# Patient Record
Sex: FEMALE | Race: WHITE | Employment: PART TIME | URBAN - METROPOLITAN AREA
[De-identification: names, ages, dates, MRNs, and addresses within clinical notes are randomized per-mention and may not be internally consistent; named-entity substitution may affect disease eponyms.]

---

## 2017-01-24 ENCOUNTER — TELEPHONE (OUTPATIENT)
Dept: DERMATOLOGY CLINIC | Facility: CLINIC | Age: 32
End: 2017-01-24

## 2017-01-24 NOTE — TELEPHONE ENCOUNTER
Ok rf , I  Thought pt did not feel this ws helpful before. Ok to rf.   I never rec'd last message routed to [facility] re: hydroquinone alternative, none really, I am not aware of coupons in the office at the moment for hydroquinone

## 2017-01-25 RX ORDER — INDAPAMIDE 1.25 MG
TABLET ORAL
Qty: 50 G | Refills: 0 | Status: SHIPPED | OUTPATIENT
Start: 2017-01-25 | End: 2017-05-01

## 2017-01-27 ENCOUNTER — OFFICE VISIT (OUTPATIENT)
Dept: OBGYN CLINIC | Facility: CLINIC | Age: 32
End: 2017-01-27

## 2017-01-27 ENCOUNTER — APPOINTMENT (OUTPATIENT)
Dept: LAB | Facility: HOSPITAL | Age: 32
End: 2017-01-27
Attending: OBSTETRICS & GYNECOLOGY
Payer: MEDICAID

## 2017-01-27 VITALS
HEIGHT: 63 IN | WEIGHT: 136.63 LBS | HEART RATE: 76 BPM | SYSTOLIC BLOOD PRESSURE: 117 MMHG | BODY MASS INDEX: 24.21 KG/M2 | DIASTOLIC BLOOD PRESSURE: 81 MMHG

## 2017-01-27 DIAGNOSIS — Z01.419 ENCOUNTER FOR GYNECOLOGICAL EXAMINATION: Primary | ICD-10-CM

## 2017-01-27 DIAGNOSIS — Z11.3 SCREEN FOR STD (SEXUALLY TRANSMITTED DISEASE): ICD-10-CM

## 2017-01-27 DIAGNOSIS — D24.2 FIBROADENOMA, LEFT: ICD-10-CM

## 2017-01-27 PROCEDURE — 36415 COLL VENOUS BLD VENIPUNCTURE: CPT

## 2017-01-27 PROCEDURE — 86780 TREPONEMA PALLIDUM: CPT

## 2017-01-27 PROCEDURE — 87340 HEPATITIS B SURFACE AG IA: CPT

## 2017-01-27 PROCEDURE — 87389 HIV-1 AG W/HIV-1&-2 AB AG IA: CPT

## 2017-01-27 PROCEDURE — 86803 HEPATITIS C AB TEST: CPT

## 2017-01-27 PROCEDURE — 99395 PREV VISIT EST AGE 18-39: CPT | Performed by: OBSTETRICS & GYNECOLOGY

## 2017-01-27 RX ORDER — CLINDAMYCIN PHOSPHATE 10 MG/G
GEL TOPICAL
COMMUNITY
Start: 2017-01-23 | End: 2017-05-01

## 2017-01-27 RX ORDER — NORGESTIMATE AND ETHINYL ESTRADIOL 0.25-0.035
1 KIT ORAL DAILY
Qty: 1 PACKAGE | Refills: 12 | Status: SHIPPED | OUTPATIENT
Start: 2017-01-27 | End: 2017-05-01

## 2017-01-27 NOTE — PATIENT INSTRUCTIONS
Pap not done. Order for left breast u/s. Refill Sprintec. Gc/chlamydia/trichomonas.    Order for HIV, Hep B, Hep C, and Trep Ab given

## 2017-01-28 NOTE — PROGRESS NOTES
Bell Oneil is a 32year old female [id number] Patient's last menstrual period was 01/25/2017 (exact date). here for annual exam.       Last seen 10/10/16. Under increased stress. Going through divorce. C/o increased acne and hair loss.   Feels it migh Sodium 40 MG Oral Tab EC Take 1 tablet (40 mg total) by mouth every morning before breakfast. 1 tab in am  30 - 60  Minutes before meal Disp: 30 tablet Rfl: 1   Dapsone (ACZONE) 5 % External Gel Use bid for acne--7.5 % too drying Disp: 90 g Rfl: 12   Ketoc nontender, nondistended, no masses  Psychiatric:  Oriented to time, place, person and situation.  Appropriate mood and affect    Pelvic Exam:  External Genitalia: normal appearance, hair distribution, and no lesions  Urethral Meatus:  normal in size, locati

## 2017-01-30 LAB
C TRACH DNA SPEC QL NAA+PROBE: NEGATIVE
HBV SURFACE AG SERPL QL IA: NONREACTIVE
HCV AB SERPL QL IA: NONREACTIVE
HIV1+2 AB SERPL QL IA: NONREACTIVE
N GONORRHOEA DNA SPEC QL NAA+PROBE: NEGATIVE
T PALLIDUM AB SER QL: NEGATIVE
T VAGINALIS RRNA SPEC QL NAA+PROBE: NEGATIVE

## 2017-01-31 ENCOUNTER — LAB ENCOUNTER (OUTPATIENT)
Dept: LAB | Age: 32
End: 2017-01-31
Attending: DERMATOLOGY
Payer: MEDICAID

## 2017-01-31 DIAGNOSIS — L70.0 ACNE VULGARIS: Primary | ICD-10-CM

## 2017-01-31 DIAGNOSIS — L90.5 SCAR CONDITION AND FIBROSIS OF SKIN: ICD-10-CM

## 2017-01-31 LAB
DHEA-S SERPL-MCNC: 183.7 UG/DL (ref 20–266)
TSH SERPL-ACNC: 1.51 UIU/ML (ref 0.34–5.6)
TSH SERPL-ACNC: 1.51 UIU/ML (ref 0.34–5.6)

## 2017-01-31 PROCEDURE — 82627 DEHYDROEPIANDROSTERONE: CPT

## 2017-01-31 PROCEDURE — 84402 ASSAY OF FREE TESTOSTERONE: CPT

## 2017-01-31 PROCEDURE — 84443 ASSAY THYROID STIM HORMONE: CPT

## 2017-01-31 PROCEDURE — 84403 ASSAY OF TOTAL TESTOSTERONE: CPT

## 2017-01-31 PROCEDURE — 82306 VITAMIN D 25 HYDROXY: CPT

## 2017-01-31 PROCEDURE — 36415 COLL VENOUS BLD VENIPUNCTURE: CPT

## 2017-02-02 LAB — 25(OH)D3 SERPL-MCNC: 33.5 NG/ML

## 2017-02-03 ENCOUNTER — TELEPHONE (OUTPATIENT)
Dept: OBGYN CLINIC | Facility: CLINIC | Age: 32
End: 2017-02-03

## 2017-02-03 DIAGNOSIS — D24.9 FIBROADENOMA, UNSPECIFIED LATERALITY: Primary | ICD-10-CM

## 2017-02-03 LAB
TESTOSTERONE, FREE, S: 0.36 NG/DL
TESTOSTERONE, TOTAL, S: 18 NG/DL

## 2017-02-03 NOTE — TELEPHONE ENCOUNTER
SHARON CALLING FROM CENTRAL SCHEDULING   REQ ORDERS FOR BILATERAL MAMMO DIAGNOSTIC W/ US IF NEEDED /PLS FAX A COPY OF THE [address]

## 2017-02-03 NOTE — TELEPHONE ENCOUNTER
Central scheduling requesting order for bilateral diagnostic mammo. MIGELK ordered Left breast U/S. Routed to [facility] to please review.

## 2017-02-06 NOTE — TELEPHONE ENCOUNTER
Yevgeniy Sweeney from [employer] stating that Radiology is requesting order for bilateral diagnostic mammogram.  Sent the order.

## 2017-03-06 ENCOUNTER — HOSPITAL ENCOUNTER (OUTPATIENT)
Dept: ULTRASOUND IMAGING | Facility: HOSPITAL | Age: 32
Discharge: HOME | End: 2017-03-06
Attending: OBSTETRICS & GYNECOLOGY
Payer: MEDICAID

## 2017-03-06 ENCOUNTER — APPOINTMENT (OUTPATIENT)
Dept: ULTRASOUND IMAGING | Facility: HOSPITAL | Age: 32
End: 2017-03-06
Attending: OBSTETRICS & GYNECOLOGY
Payer: MEDICAID

## 2017-03-06 ENCOUNTER — HOSPITAL ENCOUNTER (OUTPATIENT)
Dept: MAMMOGRAPHY | Facility: HOSPITAL | Age: 32
Discharge: HOME | End: 2017-03-06
Attending: OBSTETRICS & GYNECOLOGY
Payer: MEDICAID

## 2017-03-06 DIAGNOSIS — D24.9 FIBROADENOMA, UNSPECIFIED LATERALITY: ICD-10-CM

## 2017-03-06 PROCEDURE — 77066 DX MAMMO INCL CAD BI: CPT

## 2017-03-06 PROCEDURE — 76642 ULTRASOUND BREAST LIMITED: CPT | Performed by: RADIOLOGY

## 2017-04-05 ENCOUNTER — TELEPHONE (OUTPATIENT)
Dept: DERMATOLOGY CLINIC | Facility: CLINIC | Age: 32
End: 2017-04-05

## 2017-04-05 ENCOUNTER — OFFICE VISIT (OUTPATIENT)
Dept: DERMATOLOGY CLINIC | Facility: CLINIC | Age: 32
End: 2017-04-05

## 2017-04-05 DIAGNOSIS — L81.9 HYPERPIGMENTATION: ICD-10-CM

## 2017-04-05 DIAGNOSIS — L70.0 CYSTIC ACNE: ICD-10-CM

## 2017-04-05 DIAGNOSIS — L70.0 ACNE VULGARIS: Primary | ICD-10-CM

## 2017-04-05 PROCEDURE — 99212 OFFICE O/P EST SF 10 MIN: CPT | Performed by: DERMATOLOGY

## 2017-04-05 PROCEDURE — 99213 OFFICE O/P EST LOW 20 MIN: CPT | Performed by: DERMATOLOGY

## 2017-04-05 RX ORDER — CLINDAMYCIN AND BENZOYL PEROXIDE 10; 50 MG/G; MG/G
1 GEL TOPICAL 2 TIMES DAILY
Qty: 50 G | Refills: 12 | Status: SHIPPED | OUTPATIENT
Start: 2017-04-05 | End: 2017-05-05

## 2017-04-05 RX ORDER — SULFAMETHOXAZOLE AND TRIMETHOPRIM 800; 160 MG/1; MG/1
1 TABLET ORAL 2 TIMES DAILY
Qty: 60 TABLET | Refills: 3 | Status: SHIPPED | OUTPATIENT
Start: 2017-04-05 | End: 2017-04-11 | Stop reason: ALTCHOICE

## 2017-04-05 RX ORDER — ADAPALENE 45 G/G
GEL TOPICAL
Qty: 45 G | Refills: 6 | Status: SHIPPED | OUTPATIENT
Start: 2017-04-05 | End: 2017-04-28

## 2017-04-10 ENCOUNTER — TELEPHONE (OUTPATIENT)
Dept: INTERNAL MEDICINE CLINIC | Facility: CLINIC | Age: 32
End: 2017-04-10

## 2017-04-10 DIAGNOSIS — N89.8 VAGINAL DISCHARGE: Primary | ICD-10-CM

## 2017-04-10 NOTE — TELEPHONE ENCOUNTER
Pt calling for status update on correction to referral, she already has a referral to [facility] but it needs to be for Dr. Clare Perez.  Please advise

## 2017-04-10 NOTE — TELEPHONE ENCOUNTER
pts ts she needs referral to [address] [place] [address]. Pt lon has appt 04/11.  Please advise

## 2017-04-11 ENCOUNTER — OFFICE VISIT (OUTPATIENT)
Dept: OBGYN CLINIC | Facility: CLINIC | Age: 32
End: 2017-04-11

## 2017-04-11 VITALS
HEART RATE: 73 BPM | DIASTOLIC BLOOD PRESSURE: 82 MMHG | BODY MASS INDEX: 26 KG/M2 | SYSTOLIC BLOOD PRESSURE: 121 MMHG | WEIGHT: 144 LBS

## 2017-04-11 DIAGNOSIS — N89.8 VAGINAL DISCHARGE: Primary | ICD-10-CM

## 2017-04-11 PROCEDURE — 99212 OFFICE O/P EST SF 10 MIN: CPT | Performed by: OBSTETRICS & GYNECOLOGY

## 2017-04-11 RX ORDER — CLINDAMYCIN PHOSPHATE 10 MG/G
GEL TOPICAL
Qty: 60 G | Refills: 12 | Status: SHIPPED | OUTPATIENT
Start: 2017-04-11 | End: 2017-05-01

## 2017-04-11 NOTE — PROGRESS NOTES
Darinel Rubio is a 32year old female [id number] Patient's last menstrual period was 03/24/2017. Patient presents with:  Gyn Problem: vaginal/slight itching    Last seen 1/27/17. Having increased white discharge in last 3 weeks. Occasional itching.   No o External Shampoo Apply to scalp 2 times per week.  Disp: 120 mL Rfl: 12       ALLERGIES:    Minocycline                 Comment:Other reaction(s): Rash  Pollen                    Wheat Gluten                  PHYSICAL EXAM:   /82 mmHg  Pulse 73  Wt 14

## 2017-04-11 NOTE — TELEPHONE ENCOUNTER
Bpo/clindamycin just sent separate rx's   Tried to see if this is covered on insurance. Adapalene try pa. For acne on multiple medications. Multiple pa's in past too. ..

## 2017-04-13 ENCOUNTER — TELEPHONE (OUTPATIENT)
Dept: OBGYN CLINIC | Facility: CLINIC | Age: 32
End: 2017-04-13

## 2017-04-14 NOTE — TELEPHONE ENCOUNTER
Informed pt that [id number] stated BV screen negative. Pt wants to know why she had the discharge. Sent to [id number].

## 2017-04-23 NOTE — PROGRESS NOTES
Opal Oliveros is a 28year old female. Patient presents with:  Acne: established pt, presents for 5 months F/U for acne to face. pt on aczone gel with mild results. Minocycline; Pollen;  Wheat Gluten    Current Outpatient Prescriptions:  C 45 g Rfl: 6   Norgestimate-Eth Estradiol (SPRINTEC 28) 0.25-35 MG-MCG Oral Tab Take 1 tablet by mouth daily.  Disp: 1 Package Rfl: 12   Ferrous Gluconate 325 (36 FE) MG Oral Tab 1 po qd Disp: 30 tablet Rfl: 3   Pantoprazole Sodium 40 MG Oral Tab EC Take 1 t stairs. Family History   Problem Relation Age of Onset   • Infectious Disease Father      tuberculosis                      HPI :    Patient presents with:  Acne: established pt, presents for 5 months F/U for acne to face.  pt on aczone gel with mil scalp. Part with widening is noted.   Eyebrows eyelashes intact  ASSESSMENT AND PLAN:     Acne vulgaris  (primary encounter diagnosis)  Cystic acne  Hyperpigmentation    Assessment/ Plan:   Acne inflammatory, nodular grade 3 with prominent postinflammatory this encounter. Meds & Refills for this Visit:   Signed Prescriptions Disp Refills    Clindamycin Phos-Benzoyl Perox 1-5 % External Gel 50 g 12      Sig: Apply 1 Application topically 2 (two) times daily.  Use bid to affected areas of skin      Adapal

## 2017-04-25 NOTE — TELEPHONE ENCOUNTER
PA for adapelene placed in [address] [place] [address] inbox for signature. Pt informed BPO/Clindamycin was sent as separate prescriptions and adapelene PA is being worked on and can take up to 14 days. Verbalized understanding.

## 2017-04-26 NOTE — TELEPHONE ENCOUNTER
Informed pt of below. Pt states she has been having night sweats and thinks her hormones are out of whack. Offered appt with JOIE. Pt states she is seeing her PCP 5/2 and will call us after that if her PCP does not order labs. Pt has no further questions.

## 2017-04-28 ENCOUNTER — TELEPHONE (OUTPATIENT)
Dept: DERMATOLOGY CLINIC | Facility: CLINIC | Age: 32
End: 2017-04-28

## 2017-04-28 RX ORDER — ADAPALENE 45 G/G
GEL TOPICAL
Qty: 45 G | Refills: 6 | Status: SHIPPED | OUTPATIENT
Start: 2017-04-28 | End: 2019-06-19

## 2017-05-01 ENCOUNTER — OFFICE VISIT (OUTPATIENT)
Dept: INTERNAL MEDICINE CLINIC | Facility: CLINIC | Age: 32
End: 2017-05-01

## 2017-05-01 VITALS
HEIGHT: 63 IN | DIASTOLIC BLOOD PRESSURE: 86 MMHG | HEART RATE: 81 BPM | RESPIRATION RATE: 18 BRPM | TEMPERATURE: 98 F | BODY MASS INDEX: 25.52 KG/M2 | SYSTOLIC BLOOD PRESSURE: 116 MMHG | WEIGHT: 144 LBS

## 2017-05-01 DIAGNOSIS — K21.9 GASTROESOPHAGEAL REFLUX DISEASE WITHOUT ESOPHAGITIS: Primary | ICD-10-CM

## 2017-05-01 PROCEDURE — 99212 OFFICE O/P EST SF 10 MIN: CPT | Performed by: INTERNAL MEDICINE

## 2017-05-01 PROCEDURE — 99214 OFFICE O/P EST MOD 30 MIN: CPT | Performed by: INTERNAL MEDICINE

## 2017-05-01 RX ORDER — PANTOPRAZOLE SODIUM 40 MG/1
40 TABLET, DELAYED RELEASE ORAL
Qty: 60 TABLET | Refills: 1 | Status: SHIPPED | OUTPATIENT
Start: 2017-05-01 | End: 2017-05-25

## 2017-05-01 NOTE — PROGRESS NOTES
HPI:    Patient ID: Bell Del Cid is a 28year old female. Abdominal Pain  This is a new problem. The current episode started 1 to 4 weeks ago. The problem occurs intermittently. The problem has been waxing and waning.  Pain location: ACID IN STOMACH External Gel Apply 1 Application topically 2 (two) times daily.  Use bid to affected areas of skin Disp: 50 g Rfl: 12   Ferrous Gluconate 325 (36 FE) MG Oral Tab 1 po qd Disp: 30 tablet Rfl: 3   Ketoconazole 2 % External Shampoo Apply to scalp 2 times per w reviewed. Blood pressure 116/86, pulse 81, temperature 98.3 °F (36.8 °C), temperature source Oral, resp. rate 18, height 5' 3\" (1.6 m), weight 144 lb (65.318 kg), last menstrual period 04/18/2017, unknown if currently breastfeeding.                ASS

## 2017-05-11 ENCOUNTER — OFFICE VISIT (OUTPATIENT)
Dept: OBGYN CLINIC | Facility: CLINIC | Age: 32
End: 2017-05-11

## 2017-05-11 VITALS
WEIGHT: 149.19 LBS | DIASTOLIC BLOOD PRESSURE: 87 MMHG | BODY MASS INDEX: 26 KG/M2 | SYSTOLIC BLOOD PRESSURE: 124 MMHG | HEART RATE: 91 BPM

## 2017-05-11 DIAGNOSIS — N76.0 VAGINITIS AND VULVOVAGINITIS: Primary | ICD-10-CM

## 2017-05-11 PROCEDURE — 99213 OFFICE O/P EST LOW 20 MIN: CPT | Performed by: CLINICAL NURSE SPECIALIST

## 2017-05-11 RX ORDER — FLUCONAZOLE 150 MG/1
150 TABLET ORAL ONCE
Qty: 2 TABLET | Refills: 1 | Status: SHIPPED | OUTPATIENT
Start: 2017-05-11 | End: 2017-05-11

## 2017-05-11 NOTE — PROGRESS NOTES
Indira Osorio is a 28year old female [id number] Patient's last menstrual period was 04/18/2017 (exact date). Patient presents with:  Gyn Exam: vaginal discharge, itching  White clumpy vaginal discharge with itching x 1 week.      OBSTETRICS HISTORY:  Obste mouth 2 (two) times daily before meals.  1 tab in am  30 - 60  Minutes before meal, Disp: 60 tablet, Rfl: 1  •  Adapalene 0.1 % External Gel, Use as dir, Disp: 45 g, Rfl: 6  •  Ferrous Gluconate 325 (36 FE) MG Oral Tab, 1 po qd, Disp: 30 tablet, Rfl: 3  • diet, taking acidophilus supplements.

## 2017-05-25 ENCOUNTER — OFFICE VISIT (OUTPATIENT)
Dept: INTERNAL MEDICINE CLINIC | Facility: CLINIC | Age: 32
End: 2017-05-25

## 2017-05-25 VITALS
SYSTOLIC BLOOD PRESSURE: 114 MMHG | WEIGHT: 147 LBS | HEIGHT: 63 IN | BODY MASS INDEX: 26.05 KG/M2 | TEMPERATURE: 98 F | RESPIRATION RATE: 18 BRPM | DIASTOLIC BLOOD PRESSURE: 80 MMHG | HEART RATE: 90 BPM

## 2017-05-25 DIAGNOSIS — K21.9 GASTROESOPHAGEAL REFLUX DISEASE WITHOUT ESOPHAGITIS: Primary | ICD-10-CM

## 2017-05-25 DIAGNOSIS — D24.2 BREAST FIBROADENOMA, LEFT: ICD-10-CM

## 2017-05-25 PROCEDURE — 99212 OFFICE O/P EST SF 10 MIN: CPT | Performed by: INTERNAL MEDICINE

## 2017-05-25 PROCEDURE — 99214 OFFICE O/P EST MOD 30 MIN: CPT | Performed by: INTERNAL MEDICINE

## 2017-05-25 RX ORDER — PANTOPRAZOLE SODIUM 40 MG/1
40 TABLET, DELAYED RELEASE ORAL
Qty: 30 TABLET | Refills: 2 | Status: SHIPPED | OUTPATIENT
Start: 2017-05-25 | End: 2017-08-28

## 2017-05-25 NOTE — PROGRESS NOTES
HPI:    Patient ID: Sheela Valencia is a 28year old female. Abdominal Pain  This is a new problem. The current episode started more than 1 month ago. The problem occurs intermittently. The problem has been gradually improving.  The pain is located in t Exam   Constitutional: She is oriented to person, place, and time. She appears well-developed and well-nourished. No distress. HENT:   Head: Normocephalic and atraumatic.    Right Ear: Tympanic membrane, external ear and ear canal normal.   Left Ear: Tymp avoid wearing  tight clothes   Advised to elevate the head of the bed   Stable  With  PPIs qd in the morning 30-60 minutes before breakfast always on empty stomach    L Breast   Fibroadenoma - had  bx in  Past   Pt   State   Does not  Want  removal of   Br

## 2017-08-22 ENCOUNTER — TELEPHONE (OUTPATIENT)
Dept: DERMATOLOGY CLINIC | Facility: CLINIC | Age: 32
End: 2017-08-22

## 2017-08-22 NOTE — TELEPHONE ENCOUNTER
Pharmacy contacted informed refills are still on file. Adapalene has 6 refills, Ketoconazole has 12 refills. Pt informed.

## 2017-08-28 ENCOUNTER — OFFICE VISIT (OUTPATIENT)
Dept: INTERNAL MEDICINE CLINIC | Facility: CLINIC | Age: 32
End: 2017-08-28

## 2017-08-28 VITALS
WEIGHT: 149 LBS | HEART RATE: 76 BPM | RESPIRATION RATE: 18 BRPM | BODY MASS INDEX: 26.4 KG/M2 | SYSTOLIC BLOOD PRESSURE: 107 MMHG | DIASTOLIC BLOOD PRESSURE: 76 MMHG | TEMPERATURE: 98 F | HEIGHT: 63 IN

## 2017-08-28 DIAGNOSIS — N92.0 MENORRHAGIA WITH REGULAR CYCLE: ICD-10-CM

## 2017-08-28 DIAGNOSIS — D64.9 ANEMIA, UNSPECIFIED TYPE: ICD-10-CM

## 2017-08-28 DIAGNOSIS — K21.9 GASTROESOPHAGEAL REFLUX DISEASE WITHOUT ESOPHAGITIS: Primary | ICD-10-CM

## 2017-08-28 DIAGNOSIS — E78.00 ELEVATED CHOLESTEROL: ICD-10-CM

## 2017-08-28 PROCEDURE — 99212 OFFICE O/P EST SF 10 MIN: CPT | Performed by: INTERNAL MEDICINE

## 2017-08-28 PROCEDURE — 99214 OFFICE O/P EST MOD 30 MIN: CPT | Performed by: INTERNAL MEDICINE

## 2017-08-28 RX ORDER — DAPSONE 50 MG/G
GEL TOPICAL
COMMUNITY
Start: 2017-08-23 | End: 2019-06-19

## 2017-08-28 RX ORDER — PANTOPRAZOLE SODIUM 40 MG/1
40 TABLET, DELAYED RELEASE ORAL
Qty: 30 TABLET | Refills: 2 | Status: SHIPPED | OUTPATIENT
Start: 2017-08-28 | End: 2019-06-19

## 2017-08-28 NOTE — PROGRESS NOTES
HPI:    Patient ID: Cristel Reed is a 28year old female. Gastro-esophageal Reflux   She complains of heartburn.  She reports no abdominal pain, no chest pain, no coughing, no early satiety, no globus sensation, no nausea, no sore throat or no wheezi Wheat Gluten               PHYSICAL EXAM:   Physical Exam   Constitutional: She is oriented to person, place, and time. She appears well-developed and well-nourished. No distress. HENT:   Head: Normocephalic and atraumatic.    Right Ear: Tympanic memb to avoid wearing  tight clothes   Advised to elevate the head of the bed   Take PPIs qd in the morning 30-60 minutes before breakfast always on empty stomach    Menorrhagia with regular cycle  Refer  To  Gyne - per pt  Request   State  Changing pads  Every

## 2017-08-29 ENCOUNTER — NURSE TRIAGE (OUTPATIENT)
Dept: OTHER | Age: 32
End: 2017-08-29

## 2017-08-29 NOTE — TELEPHONE ENCOUNTER
pt made a appt to see you tomorrow she wants this blister cultured. You could do this at the office correct? If yes please close encounter. Thanks

## 2017-08-29 NOTE — TELEPHONE ENCOUNTER
Action Requested: Summary for Provider     []  Critical Lab, Recommendations Needed  [x] Need Additional Advice  []   FYI    []   Need Orders  [x] Need Medications Sent to Pharmacy  []  Other     SUMMARY:  Dr. Marino Lindquist pt stated that she was in to see y

## 2017-08-30 NOTE — TELEPHONE ENCOUNTER
Advised patient of 's note. Patient verbalized understanding.       MD Juany Hawthorne, RN Cc: [facility] Clinical Staff   Caller: Unspecified (Yesterday,  1:48 PM)             We  Do not  Culture cold sore -  Its not n

## 2017-09-12 ENCOUNTER — OFFICE VISIT (OUTPATIENT)
Dept: FAMILY MEDICINE CLINIC | Facility: CLINIC | Age: 32
End: 2017-09-12

## 2017-09-12 VITALS — HEART RATE: 73 BPM | TEMPERATURE: 98 F | DIASTOLIC BLOOD PRESSURE: 75 MMHG | SYSTOLIC BLOOD PRESSURE: 110 MMHG

## 2017-09-12 DIAGNOSIS — N76.0 ACUTE VAGINITIS: Primary | ICD-10-CM

## 2017-09-12 PROCEDURE — 99212 OFFICE O/P EST SF 10 MIN: CPT | Performed by: FAMILY MEDICINE

## 2017-09-12 PROCEDURE — 99213 OFFICE O/P EST LOW 20 MIN: CPT | Performed by: FAMILY MEDICINE

## 2017-09-12 RX ORDER — FLUCONAZOLE 150 MG/1
150 TABLET ORAL ONCE
Qty: 1 TABLET | Refills: 0 | Status: SHIPPED | OUTPATIENT
Start: 2017-09-12 | End: 2017-09-12

## 2017-09-12 NOTE — PROGRESS NOTES
HPI:    Patient ID: Boom Lyle is a 28year old female. Has itchy vaginal discharge. Otherwise well. No fever. Wanted to be checked for STDS         Review of Systems   Constitutional: Negative. Respiratory: Negative.     Cardiovascular: Negativ defined types were placed in this encounter. Meds This Visit:  Signed Prescriptions Disp Refills    fluconazole (DIFLUCAN) 150 MG Oral Tab 1 tablet 0      Sig: Take 1 tablet (150 mg total) by mouth once.            Imaging & Referrals:  None       ID#1

## 2017-09-14 LAB
C TRACH DNA SPEC QL NAA+PROBE: NEGATIVE
GENITAL VAGINOSIS SCREEN: NEGATIVE
N GONORRHOEA DNA SPEC QL NAA+PROBE: NEGATIVE
TRICHOMONAS SCREEN: NEGATIVE

## 2017-09-15 ENCOUNTER — TELEPHONE (OUTPATIENT)
Dept: OBGYN CLINIC | Facility: CLINIC | Age: 32
End: 2017-09-15

## 2017-09-15 ENCOUNTER — OFFICE VISIT (OUTPATIENT)
Dept: OBGYN CLINIC | Facility: CLINIC | Age: 32
End: 2017-09-15

## 2017-09-15 VITALS
SYSTOLIC BLOOD PRESSURE: 108 MMHG | BODY MASS INDEX: 26 KG/M2 | DIASTOLIC BLOOD PRESSURE: 72 MMHG | WEIGHT: 149.38 LBS | HEART RATE: 84 BPM

## 2017-09-15 DIAGNOSIS — N92.0 MENORRHAGIA WITH REGULAR CYCLE: ICD-10-CM

## 2017-09-15 DIAGNOSIS — N76.2 ACUTE VULVITIS: Primary | ICD-10-CM

## 2017-09-15 PROCEDURE — 99213 OFFICE O/P EST LOW 20 MIN: CPT | Performed by: OBSTETRICS & GYNECOLOGY

## 2017-09-15 RX ORDER — CLOBETASOL PROPIONATE 0.5 MG/G
CREAM TOPICAL
Qty: 30 G | Refills: 0 | Status: SHIPPED | OUTPATIENT
Start: 2017-09-15 | End: 2019-06-19

## 2017-09-15 RX ORDER — FLUCONAZOLE 150 MG/1
TABLET ORAL
COMMUNITY
Start: 2017-09-13 | End: 2018-01-29

## 2017-09-15 NOTE — TELEPHONE ENCOUNTER
Pt informed that the only labs that pt needs to have completed were ordered by her PCP on 8/28. Pt informed that no labs have been ordered by our office.  Pt reminded that her appt is at 11am so if she does go to lab prior to appt she needs to be sure she i

## 2017-09-15 NOTE — PROGRESS NOTES
Flo Favre is a 28year old female [id number] Patient's last menstrual period was 08/21/2017 (approximate). Patient presents with:  Gyn Problem: vaginal discharge & vaginal itching x10 days    Last seen 4/11/17.    Was seen by PCP 9/12/17-- neg chica/kyung. Ketoconazole 2 % External Shampoo Apply to scalp 2 times per week.  Disp: 120 mL Rfl: 12       ALLERGIES:    Minocycline                 Comment:Other reaction(s): Rash  Pollen                    Wheat Gluten                  PHYSICAL EXAM:   /72 (B

## 2017-09-15 NOTE — TELEPHONE ENCOUNTER
Pt would like to know if she should go in for her lab work before her appt at 11:00am, and if she should be fasting?  Pls adv

## 2017-09-16 ENCOUNTER — LAB ENCOUNTER (OUTPATIENT)
Dept: LAB | Age: 32
End: 2017-09-16
Attending: INTERNAL MEDICINE
Payer: COMMERCIAL

## 2017-09-16 DIAGNOSIS — D64.9 ANEMIA, UNSPECIFIED TYPE: ICD-10-CM

## 2017-09-16 DIAGNOSIS — N92.0 MENORRHAGIA WITH REGULAR CYCLE: ICD-10-CM

## 2017-09-16 DIAGNOSIS — E78.00 ELEVATED CHOLESTEROL: ICD-10-CM

## 2017-09-16 LAB
ALBUMIN SERPL BCP-MCNC: 4 G/DL (ref 3.5–4.8)
ALBUMIN/GLOB SERPL: 1.3 {RATIO} (ref 1–2)
ALP SERPL-CCNC: 36 U/L (ref 32–100)
ALT SERPL-CCNC: 24 U/L (ref 14–54)
ANION GAP SERPL CALC-SCNC: 6 MMOL/L (ref 0–18)
AST SERPL-CCNC: 22 U/L (ref 15–41)
BASOPHILS # BLD: 0 K/UL (ref 0–0.2)
BASOPHILS NFR BLD: 1 %
BILIRUB SERPL-MCNC: 0.7 MG/DL (ref 0.3–1.2)
BUN SERPL-MCNC: 13 MG/DL (ref 8–20)
BUN/CREAT SERPL: 17.1 (ref 10–20)
CALCIUM SERPL-MCNC: 8.9 MG/DL (ref 8.5–10.5)
CHLORIDE SERPL-SCNC: 105 MMOL/L (ref 95–110)
CHOLEST SERPL-MCNC: 145 MG/DL (ref 110–200)
CO2 SERPL-SCNC: 27 MMOL/L (ref 22–32)
CREAT SERPL-MCNC: 0.76 MG/DL (ref 0.5–1.5)
EOSINOPHIL # BLD: 0.2 K/UL (ref 0–0.7)
EOSINOPHIL NFR BLD: 3 %
ERYTHROCYTE [DISTWIDTH] IN BLOOD BY AUTOMATED COUNT: 12.6 % (ref 11–15)
FERRITIN SERPL IA-MCNC: 47 NG/ML (ref 11–307)
GLOBULIN PLAS-MCNC: 3.1 G/DL (ref 2.5–3.7)
GLUCOSE SERPL-MCNC: 85 MG/DL (ref 70–99)
HCT VFR BLD AUTO: 43 % (ref 35–48)
HDLC SERPL-MCNC: 58 MG/DL
HGB BLD-MCNC: 14.5 G/DL (ref 12–16)
LDLC SERPL CALC-MCNC: 68 MG/DL (ref 0–99)
LYMPHOCYTES # BLD: 1.7 K/UL (ref 1–4)
LYMPHOCYTES NFR BLD: 29 %
MCH RBC QN AUTO: 31.3 PG (ref 27–32)
MCHC RBC AUTO-ENTMCNC: 33.7 G/DL (ref 32–37)
MCV RBC AUTO: 92.9 FL (ref 80–100)
MONOCYTES # BLD: 0.4 K/UL (ref 0–1)
MONOCYTES NFR BLD: 6 %
NEUTROPHILS # BLD AUTO: 3.7 K/UL (ref 1.8–7.7)
NEUTROPHILS NFR BLD: 62 %
NONHDLC SERPL-MCNC: 87 MG/DL
OSMOLALITY UR CALC.SUM OF ELEC: 285 MOSM/KG (ref 275–295)
PLATELET # BLD AUTO: 259 K/UL (ref 140–400)
PMV BLD AUTO: 8 FL (ref 7.4–10.3)
POTASSIUM SERPL-SCNC: 3.7 MMOL/L (ref 3.3–5.1)
PROT SERPL-MCNC: 7.1 G/DL (ref 5.9–8.4)
RBC # BLD AUTO: 4.63 M/UL (ref 3.7–5.4)
RBC #/AREA URNS AUTO: 18 /HPF
SODIUM SERPL-SCNC: 138 MMOL/L (ref 136–144)
TRIGL SERPL-MCNC: 94 MG/DL (ref 1–149)
TSH SERPL-ACNC: 2.49 UIU/ML (ref 0.45–5.33)
WBC # BLD AUTO: 5.9 K/UL (ref 4–11)
WBC #/AREA URNS AUTO: 4 /HPF

## 2017-09-16 PROCEDURE — 36415 COLL VENOUS BLD VENIPUNCTURE: CPT

## 2017-09-16 PROCEDURE — 80061 LIPID PANEL: CPT

## 2017-09-16 PROCEDURE — 85025 COMPLETE CBC W/AUTO DIFF WBC: CPT

## 2017-09-16 PROCEDURE — 81015 MICROSCOPIC EXAM OF URINE: CPT

## 2017-09-16 PROCEDURE — 80053 COMPREHEN METABOLIC PANEL: CPT

## 2017-09-16 PROCEDURE — 82728 ASSAY OF FERRITIN: CPT

## 2017-09-16 PROCEDURE — 84443 ASSAY THYROID STIM HORMONE: CPT

## 2017-09-18 ENCOUNTER — OFFICE VISIT (OUTPATIENT)
Dept: INTERNAL MEDICINE CLINIC | Facility: CLINIC | Age: 32
End: 2017-09-18

## 2017-09-18 VITALS
WEIGHT: 152 LBS | HEIGHT: 63 IN | SYSTOLIC BLOOD PRESSURE: 117 MMHG | TEMPERATURE: 98 F | HEART RATE: 83 BPM | BODY MASS INDEX: 26.93 KG/M2 | DIASTOLIC BLOOD PRESSURE: 77 MMHG | RESPIRATION RATE: 20 BRPM

## 2017-09-18 DIAGNOSIS — R53.83 FATIGUE, UNSPECIFIED TYPE: ICD-10-CM

## 2017-09-18 DIAGNOSIS — B00.1 HERPES LABIALIS: ICD-10-CM

## 2017-09-18 DIAGNOSIS — B00.1 COLD SORE: Primary | ICD-10-CM

## 2017-09-18 DIAGNOSIS — L65.9 THINNING HAIR: ICD-10-CM

## 2017-09-18 DIAGNOSIS — R53.83 OTHER FATIGUE: ICD-10-CM

## 2017-09-18 PROCEDURE — 99214 OFFICE O/P EST MOD 30 MIN: CPT | Performed by: INTERNAL MEDICINE

## 2017-09-18 PROCEDURE — 99212 OFFICE O/P EST SF 10 MIN: CPT | Performed by: INTERNAL MEDICINE

## 2017-09-18 RX ORDER — ACYCLOVIR 50 MG/G
OINTMENT TOPICAL
Qty: 1 TUBE | Refills: 0 | Status: SHIPPED | OUTPATIENT
Start: 2017-09-18 | End: 2018-01-29

## 2017-09-18 NOTE — PROGRESS NOTES
HPI:    Patient ID: Patricia Salazar is a 28year old female. Skin   Chronicity: cold  sore on lip  now  resolved need  refill on  cream   The problem occurs intermittently. The problem has been resolved.  Associated symptoms include fatigue and urinary PHYSICAL EXAM:   Physical Exam   Constitutional: She is oriented to person, place, and time. She appears well-developed and well-nourished. No distress. HENT:   Head: Normocephalic and atraumatic.    Right Ear: Tympanic membrane, external ear and ear ca needed    Fatigue, unspecified type  Thinning hair  F/u  With Dermatologist  , CPM  Continue iron tab  [place]  Few  More months qd  Asa screen     Anxiety  And  Depression  Advised  To see therapist and  Psychiatrist soon - pt state has apt  Soon       Order

## 2017-09-19 ENCOUNTER — APPOINTMENT (OUTPATIENT)
Dept: LAB | Age: 32
End: 2017-09-19
Attending: INTERNAL MEDICINE
Payer: COMMERCIAL

## 2017-09-19 DIAGNOSIS — R53.83 FATIGUE, UNSPECIFIED TYPE: ICD-10-CM

## 2017-09-19 DIAGNOSIS — L65.9 THINNING HAIR: ICD-10-CM

## 2017-09-19 LAB
CRP SERPL-MCNC: <0.5 MG/DL (ref 0–0.9)
ERYTHROCYTE [SEDIMENTATION RATE] IN BLOOD: 7 MM/HR (ref 0–20)
RHEUMATOID FACT SER QL: <5 IU/ML

## 2017-09-19 PROCEDURE — 86431 RHEUMATOID FACTOR QUANT: CPT

## 2017-09-19 PROCEDURE — 36415 COLL VENOUS BLD VENIPUNCTURE: CPT

## 2017-09-19 PROCEDURE — 86038 ANTINUCLEAR ANTIBODIES: CPT

## 2017-09-19 PROCEDURE — 85652 RBC SED RATE AUTOMATED: CPT

## 2017-09-19 PROCEDURE — 86140 C-REACTIVE PROTEIN: CPT

## 2017-09-21 LAB — ANA SER QL: NEGATIVE

## 2017-09-22 ENCOUNTER — TELEPHONE (OUTPATIENT)
Dept: INTERNAL MEDICINE CLINIC | Facility: CLINIC | Age: 32
End: 2017-09-22

## 2017-09-22 NOTE — TELEPHONE ENCOUNTER
Written by Mattie Ryan MD on 9/22/2017  7:03 AM   Your inflammatory markers are negative sedimentation rate, C-reactive protein, screen for lupus and rheumatoid arthritis is negative which is good .      Please follow-up with your dermatologist on a

## 2017-09-29 ENCOUNTER — OFFICE VISIT (OUTPATIENT)
Dept: DERMATOLOGY CLINIC | Facility: CLINIC | Age: 32
End: 2017-09-29

## 2017-09-29 DIAGNOSIS — L70.0 ACNE VULGARIS: Primary | ICD-10-CM

## 2017-09-29 DIAGNOSIS — L65.9 ALOPECIA: ICD-10-CM

## 2017-09-29 DIAGNOSIS — L70.0 CYSTIC ACNE: ICD-10-CM

## 2017-09-29 PROCEDURE — 99213 OFFICE O/P EST LOW 20 MIN: CPT | Performed by: DERMATOLOGY

## 2017-09-29 PROCEDURE — 99212 OFFICE O/P EST SF 10 MIN: CPT | Performed by: DERMATOLOGY

## 2017-09-29 RX ORDER — SPIRONOLACTONE 25 MG/1
25 TABLET ORAL DAILY
Qty: 30 TABLET | Refills: 3 | Status: SHIPPED | OUTPATIENT
Start: 2017-09-29 | End: 2019-06-19

## 2017-09-29 RX ORDER — CLOBETASOL PROPIONATE 0.46 MG/ML
SOLUTION TOPICAL
Qty: 50 ML | Refills: 6 | Status: SHIPPED | OUTPATIENT
Start: 2017-09-29 | End: 2019-06-19

## 2017-09-29 RX ORDER — CLINDAMYCIN PHOSPHATE 11.9 MG/ML
1 SOLUTION TOPICAL 2 TIMES DAILY
Qty: 60 ML | Refills: 12 | Status: SHIPPED | OUTPATIENT
Start: 2017-09-29 | End: 2017-10-29

## 2017-10-09 NOTE — PROGRESS NOTES
Sharron Villareal is a 28year old female. Patient presents with:  Acne: pt here after last visit on 04/05/2017 states that she thinks that her acne is getting worse nothing has really changed according to her.    Alopecia: pt states that the shampoo that Outpatient Prescriptions:  Clobetasol Propionate 0.05 % External Solution Use bid to scalp Disp: 50 mL Rfl: 6   spironolactone 25 MG Oral Tab Take 1 tablet (25 mg total) by mouth daily.  Start with 1/2 tab for 1 week Disp: 30 tablet Rfl: 3   Clindamycin Stuart Topics Concern    Caffeine Concern No    Exercise No    Pt has a pacemaker No    Pt has a defibrillator No    Reaction to local anesthetic No     Social History Narrative    The patient does not use an assistive device. .      The patient does live in a stef erythema  , hyperpigmentation  Over  Cheeks, jaw line.   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 Await response to Aldactone. Clobetasol solution to itchier areas.   Stable overall hair regimen discussed including B. vitamin supplementation,

## 2018-01-15 ENCOUNTER — HOSPITAL ENCOUNTER (OUTPATIENT)
Dept: ULTRASOUND IMAGING | Facility: HOSPITAL | Age: 33
Discharge: HOME | End: 2018-01-15
Attending: OBSTETRICS & GYNECOLOGY
Payer: MEDICAID

## 2018-01-15 DIAGNOSIS — N92.0 MENORRHAGIA WITH REGULAR CYCLE: ICD-10-CM

## 2018-01-15 PROCEDURE — 76856 US EXAM PELVIC COMPLETE: CPT | Performed by: OBSTETRICS & GYNECOLOGY

## 2018-01-15 PROCEDURE — 76830 TRANSVAGINAL US NON-OB: CPT | Performed by: OBSTETRICS & GYNECOLOGY

## 2018-01-22 ENCOUNTER — TELEPHONE (OUTPATIENT)
Dept: OBGYN CLINIC | Facility: CLINIC | Age: 33
End: 2018-01-22

## 2018-01-22 NOTE — TELEPHONE ENCOUNTER
Informed pt that [facility] stated normal pelvic u/s. Informed pt that [facility] stated no new recs since pt is trying to get pregnant.

## 2018-01-22 NOTE — TELEPHONE ENCOUNTER
Pt informed that Odalis Owens has not reviewed pts pelvic US from 1/15/18, but results state \"unremarkable pelvic ultrasound\". Pt informed that message will be sent to Odalis Owens and we will contact her if there are any further recs. Pt verbalized understanding.

## 2018-01-25 ENCOUNTER — TELEPHONE (OUTPATIENT)
Dept: OBGYN CLINIC | Facility: CLINIC | Age: 33
End: 2018-01-25

## 2018-01-29 ENCOUNTER — OFFICE VISIT (OUTPATIENT)
Dept: OBGYN CLINIC | Facility: CLINIC | Age: 33
End: 2018-01-29

## 2018-01-29 VITALS
DIASTOLIC BLOOD PRESSURE: 84 MMHG | SYSTOLIC BLOOD PRESSURE: 127 MMHG | HEART RATE: 94 BPM | BODY MASS INDEX: 28 KG/M2 | WEIGHT: 159 LBS

## 2018-01-29 DIAGNOSIS — R10.2 PELVIC PAIN: Primary | ICD-10-CM

## 2018-01-29 PROCEDURE — 99212 OFFICE O/P EST SF 10 MIN: CPT | Performed by: OBSTETRICS & GYNECOLOGY

## 2018-01-29 NOTE — PROGRESS NOTES
Darinel Rubio is a 28year old female [id number] Patient's last menstrual period was 01/03/2018. Patient presents with:  Consult: U/S results    Last seen 9/15/17. Stopped ocp ? 8/2017. Trying to get pregnant with new . Menses q month x 7 days. total) by mouth every morning before breakfast. 1 tab in am  30 - 60  Minutes before meal Disp: 30 tablet Rfl: 2   Adapalene 0.1 % External Gel Use as dir Disp: 45 g Rfl: 6   Ketoconazole 2 % External Shampoo Apply to scalp 2 times per week.  Disp: 120 mL R

## 2018-02-05 ENCOUNTER — OFFICE VISIT (OUTPATIENT)
Dept: DERMATOLOGY CLINIC | Facility: CLINIC | Age: 33
End: 2018-02-05

## 2018-02-05 DIAGNOSIS — L70.0 CYSTIC ACNE: Primary | ICD-10-CM

## 2018-02-05 DIAGNOSIS — L65.9 ALOPECIA: ICD-10-CM

## 2018-02-05 DIAGNOSIS — L30.9 DERMATITIS: ICD-10-CM

## 2018-02-05 DIAGNOSIS — L90.5 SCAR CONDITION AND FIBROSIS OF SKIN: ICD-10-CM

## 2018-02-05 DIAGNOSIS — L70.0 ACNE VULGARIS: ICD-10-CM

## 2018-02-05 PROCEDURE — 99212 OFFICE O/P EST SF 10 MIN: CPT | Performed by: DERMATOLOGY

## 2018-02-05 PROCEDURE — 99214 OFFICE O/P EST MOD 30 MIN: CPT | Performed by: DERMATOLOGY

## 2018-02-05 RX ORDER — AMOXICILLIN 875 MG/1
875 TABLET, COATED ORAL 2 TIMES DAILY
Qty: 60 TABLET | Refills: 3 | Status: SHIPPED | OUTPATIENT
Start: 2018-02-05 | End: 2019-06-19

## 2018-02-05 RX ORDER — SELENIUM SULFIDE 2.5 MG/100ML
LOTION TOPICAL
Qty: 120 ML | Refills: 3 | Status: SHIPPED | OUTPATIENT
Start: 2018-02-05 | End: 2019-06-19

## 2018-02-06 ENCOUNTER — TELEPHONE (OUTPATIENT)
Dept: DERMATOLOGY CLINIC | Facility: CLINIC | Age: 33
End: 2018-02-06

## 2018-02-06 NOTE — TELEPHONE ENCOUNTER
Patient call regarding medication prescribed by dr CASTELLANO [facility] - please call [phone].

## 2018-02-06 NOTE — TELEPHONE ENCOUNTER
Please try --pt aware may not go through--she had coupons for brand before to pay out of pocket ,but these are no longer available. She is aware of man topicals not being covered.   Pt has been on accutane in the past, ocp/spironlactone, numerous antibioti

## 2018-02-17 NOTE — TELEPHONE ENCOUNTER
Please try pa--for tazorac. See previous message--would this be cheaper at [facility]?

## 2018-02-18 NOTE — PROGRESS NOTES
Arnoldo Rangel is a 28year old female.     Patient presents with:  Acne: f/u with acne to face and back pt currently using Aczone still having flare-ups  Alopecia: f/u pt notes is not currently using anything at this time would like to discuss other lis tobacco: Never Used                      Alcohol use: No                            Current Outpatient Prescriptions:  selenium sulfide 2.5 % External Lotion Apply topically 2 times every week to affected area(s).  Disp: 120 mL Rfl: 3   Tazarotene 0.05 % Ex status: Never Smoker    Smokeless tobacco: Never Used    Alcohol use No    Drug use: No    Comment: None.      Sexual activity: Yes    Partners: Male    Birth control/ protection:      Other Topics Concern    Caffeine Concern No    Exercise No    Pt has a p flare with her cycle more persistent lesions throughout now. Notes more flareups with her acne around this. Has recently noted more of a flare. Physical examination:     There were no vitals taken for this visit.   GENERAL: well developed, well steve ketoconazole every several days will alternate this with other shampoos. Postinflammatory hyperpigmentation and hydroquinone. Acne overall with more inflammatory cystic nodules and postinflammatory changes recently.   No oral medications presently mild se above therapies. If stable RTC one year or p.r.n. No orders of the defined types were placed in this encounter.       Meds & Refills for this Visit:   Signed Prescriptions Disp Refills    selenium sulfide 2.5 % External Lotion 120 mL 3      Sig: Ron

## 2018-02-19 NOTE — TELEPHONE ENCOUNTER
Pt returned telephone call. Informed that we had tried to contact her regarding her Tazorac Rx. Pt informed that PA for Tazorac is being completed today. Pt also given the choice to have Rx for Tazorac be sent to [facility].   Instructed that L

## 2018-02-20 NOTE — TELEPHONE ENCOUNTER
PA denied. Attempted to call pt on phone number on file but it is disconnected. See previous message, if pt would like rx to be sent to [facility].

## 2019-06-19 ENCOUNTER — OFFICE VISIT (OUTPATIENT)
Dept: FAMILY MEDICINE CLINIC | Facility: CLINIC | Age: 34
End: 2019-06-19
Payer: MEDICAID

## 2019-06-19 VITALS
OXYGEN SATURATION: 100 % | BODY MASS INDEX: 28.35 KG/M2 | WEIGHT: 160 LBS | HEIGHT: 63 IN | DIASTOLIC BLOOD PRESSURE: 80 MMHG | SYSTOLIC BLOOD PRESSURE: 118 MMHG | RESPIRATION RATE: 13 BRPM | HEART RATE: 100 BPM

## 2019-06-19 DIAGNOSIS — Z13.6 SCREENING FOR CARDIOVASCULAR CONDITION: ICD-10-CM

## 2019-06-19 DIAGNOSIS — Z00.00 HEALTHCARE MAINTENANCE: ICD-10-CM

## 2019-06-19 DIAGNOSIS — N97.9 FEMALE FERTILITY PROBLEMS: ICD-10-CM

## 2019-06-19 DIAGNOSIS — Z12.4 PAP SMEAR FOR CERVICAL CANCER SCREENING: ICD-10-CM

## 2019-06-19 DIAGNOSIS — Z00.00 WELLNESS EXAMINATION: Primary | ICD-10-CM

## 2019-06-19 DIAGNOSIS — N92.1 MENOMETRORRHAGIA: ICD-10-CM

## 2019-06-19 DIAGNOSIS — L70.0 ACNE VULGARIS: ICD-10-CM

## 2019-06-19 PROCEDURE — 87624 HPV HI-RISK TYP POOLED RSLT: CPT | Performed by: FAMILY MEDICINE

## 2019-06-19 PROCEDURE — 99385 PREV VISIT NEW AGE 18-39: CPT | Performed by: FAMILY MEDICINE

## 2019-06-19 PROCEDURE — 99202 OFFICE O/P NEW SF 15 MIN: CPT | Performed by: FAMILY MEDICINE

## 2019-06-19 PROCEDURE — 88175 CYTOPATH C/V AUTO FLUID REDO: CPT | Performed by: FAMILY MEDICINE

## 2019-06-19 NOTE — PROGRESS NOTES
CC: Annual Physical Exam    HPI:   Salima Acosta is a 29year old female who presents for a complete physical exam.    HCM  -Diet:  Well-balanced.   -Exercise regularly  -Mental Health: denies any depression or anxiety sx  -Skin care:  no concerning les of cervical cancer and its precursors. False negative and false positive results can occur. Regular sampling is recommended to minimize false negative results.       Case Report       Gynecologic Cytology                              Case: [id number] Procedure Laterality Date   • BREAST BIOPSY Left 2014   •         Family History   Problem Relation Age of Onset   • Infectious Disease Father         tuberculosis      Social History:   Social History    Tobacco Use      Smoking status: N erythema. Nose: patent, no nasal discharge Throat:  No tonsillar erythema or exudate. Mouth:  No oral lesions   NECK: Supple, no CLAD, no thyromegaly. SKIN: No rashes, no skin lesion  HEART:  Regular rate and rhythm, no murmurs, rubs or gallops.   LUNGS: ER precautions discussed.

## 2019-06-20 ENCOUNTER — NURSE ONLY (OUTPATIENT)
Dept: FAMILY MEDICINE CLINIC | Facility: CLINIC | Age: 34
End: 2019-06-20
Payer: MEDICAID

## 2019-06-20 DIAGNOSIS — Z00.00 HEALTHCARE MAINTENANCE: ICD-10-CM

## 2019-06-20 DIAGNOSIS — Z00.00 WELLNESS EXAMINATION: Primary | ICD-10-CM

## 2019-06-20 DIAGNOSIS — Z13.6 SCREENING FOR CARDIOVASCULAR CONDITION: ICD-10-CM

## 2019-06-20 PROCEDURE — 81003 URINALYSIS AUTO W/O SCOPE: CPT | Performed by: FAMILY MEDICINE

## 2019-06-20 PROCEDURE — 80061 LIPID PANEL: CPT | Performed by: FAMILY MEDICINE

## 2019-06-20 PROCEDURE — 84443 ASSAY THYROID STIM HORMONE: CPT | Performed by: FAMILY MEDICINE

## 2019-06-20 PROCEDURE — 80053 COMPREHEN METABOLIC PANEL: CPT | Performed by: FAMILY MEDICINE

## 2019-06-20 PROCEDURE — 85025 COMPLETE CBC W/AUTO DIFF WBC: CPT | Performed by: FAMILY MEDICINE

## 2019-06-20 PROCEDURE — 36415 COLL VENOUS BLD VENIPUNCTURE: CPT | Performed by: FAMILY MEDICINE

## 2019-06-20 NOTE — PROGRESS NOTES
Patient presented to clinic for routine + TSH blood work. Orders verified in patient chart. Name and  verified. Patient is fasting. Blood drawn from the right antecubital, tolerated well without complications.   Re-ordered CBC, CMP, Lipid, and UA as i

## 2019-06-27 ENCOUNTER — TELEPHONE (OUTPATIENT)
Dept: FAMILY MEDICINE CLINIC | Facility: CLINIC | Age: 34
End: 2019-06-27

## 2019-06-27 ENCOUNTER — OFFICE VISIT (OUTPATIENT)
Dept: FAMILY MEDICINE CLINIC | Facility: CLINIC | Age: 34
End: 2019-06-27
Payer: MEDICAID

## 2019-06-27 VITALS
HEIGHT: 63 IN | SYSTOLIC BLOOD PRESSURE: 102 MMHG | BODY MASS INDEX: 28.35 KG/M2 | TEMPERATURE: 98 F | DIASTOLIC BLOOD PRESSURE: 72 MMHG | OXYGEN SATURATION: 100 % | HEART RATE: 62 BPM | WEIGHT: 160 LBS

## 2019-06-27 DIAGNOSIS — H01.004 BLEPHARITIS OF LEFT UPPER EYELID, UNSPECIFIED TYPE: Primary | ICD-10-CM

## 2019-06-27 DIAGNOSIS — K52.9 GASTROENTERITIS: ICD-10-CM

## 2019-06-27 PROCEDURE — 99213 OFFICE O/P EST LOW 20 MIN: CPT | Performed by: PHYSICIAN ASSISTANT

## 2019-06-27 RX ORDER — ONDANSETRON 4 MG/1
4 TABLET, ORALLY DISINTEGRATING ORAL EVERY 8 HOURS PRN
Qty: 20 TABLET | Refills: 0 | Status: SHIPPED | OUTPATIENT
Start: 2019-06-27 | End: 2019-07-12 | Stop reason: ALTCHOICE

## 2019-06-27 RX ORDER — ERYTHROMYCIN 5 MG/G
1 OINTMENT OPHTHALMIC EVERY 6 HOURS
Qty: 3.5 G | Refills: 0 | Status: SHIPPED | OUTPATIENT
Start: 2019-06-27 | End: 2019-07-12 | Stop reason: ALTCHOICE

## 2019-06-27 NOTE — PROGRESS NOTES
CHIEF COMPLAINT:   Patient presents with:  Eye Problem: Left eye, itchy, swollen, red, started yesterday. Had a cold 2 weeks ago is not sure if its related.  Having diarrhea for 5 days   Diarrhea      HPI:   Herbert Almazan is a 29year old female who pres Smokeless tobacco: Never Used    Alcohol use: No      Alcohol/week: 0.0 oz    Drug use: No      Comment: None.          REVIEW OF SYSTEMS:   GENERAL: + fever up to 100 with N/V/D  SKIN: no rashes  EYES:  See HPI  HENT: denies ear pain, congestion, sore PLAN:1. Medication as listed below. Hygeine and comfort care as listed below and in patient instructions. Will cover for bacterial and  Allergic/irrtant. 2. Advised Zofran prn. Continue fluids as tolerated. S/sx of dehydration discussed.  If loose stools 6. Repeat on your other eye. Date Last Reviewed: 3/1/2018  © 7010-8360 [employer]. [address] [facility], [facility] [place]. All rights reserved. This information is not intended as a substitute for professional medical care.  Always follow Vomiting and diarrhea can make you miserable. Your stomach and bowels are reacting to an irritant. This might be food, medicine, or viral stomach flu. Vomiting and diarrhea are two ways your body can remove the problem from your system.  Nausea is a symptom · Certain over-the-counter antihistamines can help control nausea. Other medicines can help soothe stomach upset. Ask your healthcare provider which medicines may help you.   When to call your healthcare provider  Call your healthcare provider if you have:

## 2019-06-27 NOTE — TELEPHONE ENCOUNTER
Pt called the office stating that she is having itchiness in the eyes. She has been scratching, attempted to wash with water and put a cold wash cloth under her eyes. Left eye is swollen and red.  Wants to know if there is any over the counter medication th

## 2019-06-27 NOTE — TELEPHONE ENCOUNTER
Pt c/o itchiness, watery eyes, redness. Only tried warm/cold compresses. Advised patient to try Visine OTC and take OTC allergy medications such as allegra or claritin and see if it helps as it sounds like allergies. Patient agrees and understands.   Devon

## 2019-06-27 NOTE — TELEPHONE ENCOUNTER
Pt called back stating that visine will not work for her. She states that a Dr. Eugenie Benoit she does not remember has given her ointment for this issue in the past. Suggested an appointment with her PCP-=Dr. González Mckenna, however she does not have availability today.

## 2019-06-27 NOTE — PATIENT INSTRUCTIONS
Treating Blepharitis: Self-Care    To treat the problem, keep your eyelids clean. Warm compresses can reduce redness and swelling, and help clean your eyelids, too. You may also need to wash the area gently with an eyelid scrub when you wake up.   To appl · Redness, irritation, and tearing of your eyelids  · Swollen, tender eyelids  · Blurred vision  · Itching around your eyelashes  · Greasy flakes or scales around your eyelashes  · Hard crusts at the base of your eyelashes.  These crusts may cause your eyel · Suck on ice chips if the thought of drinking something makes you queasy. When you’re able to eat again  Tips include the following:  · As your appetite comes back, you can resume your normal diet.   · Ask your healthcare provider whether you should stay

## 2019-07-02 ENCOUNTER — TELEPHONE (OUTPATIENT)
Dept: FAMILY MEDICINE CLINIC | Facility: CLINIC | Age: 34
End: 2019-07-02

## 2019-07-02 NOTE — TELEPHONE ENCOUNTER
Called patient is aware labs are normal  Per patient she is having a lot of gas advised her to take GasX over the counter and follow up if needed

## 2019-07-02 NOTE — TELEPHONE ENCOUNTER
Patient calling regarding her lab results from last visit, 06/20/2019, and patient would like to know if needs to come in. Please advise.

## 2019-07-11 ENCOUNTER — TELEPHONE (OUTPATIENT)
Dept: FAMILY MEDICINE CLINIC | Facility: CLINIC | Age: 34
End: 2019-07-11

## 2019-07-12 ENCOUNTER — TELEPHONE (OUTPATIENT)
Dept: FAMILY MEDICINE CLINIC | Facility: CLINIC | Age: 34
End: 2019-07-12

## 2019-07-12 ENCOUNTER — OFFICE VISIT (OUTPATIENT)
Dept: DERMATOLOGY CLINIC | Facility: CLINIC | Age: 34
End: 2019-07-12
Payer: MEDICAID

## 2019-07-12 DIAGNOSIS — L70.0 CYSTIC ACNE: ICD-10-CM

## 2019-07-12 DIAGNOSIS — L70.0 ACNE VULGARIS: Primary | ICD-10-CM

## 2019-07-12 DIAGNOSIS — Z12.31 SCREENING MAMMOGRAM, ENCOUNTER FOR: Primary | ICD-10-CM

## 2019-07-12 DIAGNOSIS — D24.2 FIBROADENOMA OF LEFT BREAST: ICD-10-CM

## 2019-07-12 PROCEDURE — 99213 OFFICE O/P EST LOW 20 MIN: CPT | Performed by: DERMATOLOGY

## 2019-07-12 RX ORDER — ADAPALENE AND BENZOYL PEROXIDE .1; 2.5 G/100G; G/100G
GEL TOPICAL
Qty: 45 G | Refills: 6 | Status: SHIPPED | OUTPATIENT
Start: 2019-07-12 | End: 2020-09-16 | Stop reason: ALTCHOICE

## 2019-07-12 RX ORDER — ADAPALENE AND BENZOYL PEROXIDE 3; 25 MG/G; MG/G
1 GEL TOPICAL DAILY
Qty: 45 G | Refills: 6 | Status: SHIPPED | OUTPATIENT
Start: 2019-07-12 | End: 2020-09-16 | Stop reason: ALTCHOICE

## 2019-07-15 NOTE — TELEPHONE ENCOUNTER
I guess we can order a screening one.  Let her know to call her insurance 1st to assure coverage given her age

## 2019-07-15 NOTE — TELEPHONE ENCOUNTER
Order entered for regular screening mammogram. Phone number for central scheduling given to patient. Advised to consult with insurance for coverage due to age. Patient verbalized understanding.

## 2019-07-16 ENCOUNTER — HOSPITAL ENCOUNTER (OUTPATIENT)
Dept: MAMMOGRAPHY | Facility: HOSPITAL | Age: 34
Discharge: HOME | End: 2019-07-16
Attending: FAMILY MEDICINE
Payer: MEDICAID

## 2019-07-16 DIAGNOSIS — R92.8 ABNORMAL MAMMOGRAM: Primary | ICD-10-CM

## 2019-07-16 DIAGNOSIS — Z12.31 SCREENING MAMMOGRAM, ENCOUNTER FOR: ICD-10-CM

## 2019-07-16 PROCEDURE — 77067 SCR MAMMO BI INCL CAD: CPT | Performed by: FAMILY MEDICINE

## 2019-07-16 PROCEDURE — 77063 BREAST TOMOSYNTHESIS BI: CPT | Performed by: FAMILY MEDICINE

## 2019-07-17 ENCOUNTER — TELEPHONE (OUTPATIENT)
Dept: FAMILY MEDICINE CLINIC | Facility: CLINIC | Age: 34
End: 2019-07-17

## 2019-07-17 ENCOUNTER — OFFICE VISIT (OUTPATIENT)
Dept: OBGYN CLINIC | Facility: CLINIC | Age: 34
End: 2019-07-17
Payer: MEDICAID

## 2019-07-17 VITALS
BODY MASS INDEX: 28 KG/M2 | DIASTOLIC BLOOD PRESSURE: 85 MMHG | HEART RATE: 92 BPM | WEIGHT: 158.81 LBS | SYSTOLIC BLOOD PRESSURE: 123 MMHG

## 2019-07-17 DIAGNOSIS — N92.6 IRREGULAR PERIODS: Primary | ICD-10-CM

## 2019-07-17 PROCEDURE — 99212 OFFICE O/P EST SF 10 MIN: CPT | Performed by: OBSTETRICS & GYNECOLOGY

## 2019-07-17 NOTE — PATIENT INSTRUCTIONS
Day 1= 1st day of period  Take Clomid one pill a day from Day 5 to Day 9  [place] from Day 11 every other day for a week.   Take blood test on Day 21 which progesterone level

## 2019-07-17 NOTE — TELEPHONE ENCOUNTER
Patient is calling in regards to her mammogram results. She states she went yesterday to get it done and today she received a call stating she needs to get another test done.  She is confused and wants to know as to why she needs to get a second mammogram.

## 2019-07-17 NOTE — TELEPHONE ENCOUNTER
Patient came in to the office per Dr Khadar Johnson due to patient's age there was a possibility of needing additional imaging due to breast density because of age, pt notified of this and was informed additional imaging would be needed, pt requesting US to be d

## 2019-07-22 NOTE — PROGRESS NOTES
Anabel Perla is a 29year old female. Patient presents with:  Acne: LOV 2/5/2018. Pt presenting for f/u with acne to face and back. Previously used Epiduo - Pt requesting refills. Minocycline; Pollen;  Wheat Gluten    Current Outpatient Disp: 30 g Rfl: 0   clomiPHENE Citrate 50 MG Oral Tab Take one pill a day from Day 5-9 Disp: 5 tablet Rfl: 0     Allergies:     Minocycline                 Comment:Other reaction(s): Rash  Pollen                    Wheat Gluten                Past Medical Intimate partner violence:        Fear of current or ex partner: Not on file        Emotionally abused: Not on file        Physically abused: Not on file        Forced sexual activity: Not on file    Other Topics      Concerns:         Service: Not medications, allergies as noted. Some change in cycle tends to flare with her cycle more persistent lesions throughout now. Notes more flareups with her acne around this. Has recently noted more of a flare.       Physical examination:     LMP 06/29/2019 presently    Dandruff stable    No further Accutane at this time    Hair loss stable supportive care discussed       eczema stable continue moisturizers s. If stable RTC one year or p.r.n.     No orders of the defined types were placed in this encounte

## 2019-07-30 ENCOUNTER — HOSPITAL ENCOUNTER (OUTPATIENT)
Dept: MAMMOGRAPHY | Facility: HOSPITAL | Age: 34
Discharge: HOME | End: 2019-07-30
Attending: FAMILY MEDICINE
Payer: MEDICAID

## 2019-07-30 ENCOUNTER — TELEPHONE (OUTPATIENT)
Dept: FAMILY MEDICINE CLINIC | Facility: CLINIC | Age: 34
End: 2019-07-30

## 2019-07-30 ENCOUNTER — HOSPITAL ENCOUNTER (OUTPATIENT)
Dept: ULTRASOUND IMAGING | Facility: HOSPITAL | Age: 34
Discharge: HOME | End: 2019-07-30
Attending: FAMILY MEDICINE
Payer: MEDICAID

## 2019-07-30 DIAGNOSIS — R92.8 ABNORMAL MAMMOGRAM: ICD-10-CM

## 2019-07-30 PROCEDURE — 77066 DX MAMMO INCL CAD BI: CPT | Performed by: FAMILY MEDICINE

## 2019-07-30 PROCEDURE — 76642 ULTRASOUND BREAST LIMITED: CPT | Performed by: FAMILY MEDICINE

## 2019-07-30 PROCEDURE — 77062 BREAST TOMOSYNTHESIS BI: CPT | Performed by: FAMILY MEDICINE

## 2019-07-30 NOTE — TELEPHONE ENCOUNTER
Type Date User Summary Attachment    07/30/2019 11:27 AM Ruma Diggs - -   Note    Per Brenda online CPT code 52684 (right side) is approved, Auth #: [id number] valid from 7/30/19 to 9/13/19.              Type Date User Summary Attachment    07/30/2

## 2019-10-07 ENCOUNTER — OFFICE VISIT (OUTPATIENT)
Dept: DERMATOLOGY CLINIC | Facility: CLINIC | Age: 34
End: 2019-10-07
Payer: MEDICAID

## 2019-10-07 DIAGNOSIS — L72.0 MILIA: ICD-10-CM

## 2019-10-07 DIAGNOSIS — L70.0 ACNE VULGARIS: Primary | ICD-10-CM

## 2019-10-07 DIAGNOSIS — L65.9 ALOPECIA: ICD-10-CM

## 2019-10-07 DIAGNOSIS — D23.30 BENIGN NEOPLASM OF SKIN OF FACE: ICD-10-CM

## 2019-10-07 DIAGNOSIS — L30.9 DERMATITIS: ICD-10-CM

## 2019-10-07 PROCEDURE — 99213 OFFICE O/P EST LOW 20 MIN: CPT | Performed by: DERMATOLOGY

## 2019-10-07 RX ORDER — FLUOCINONIDE 0.5 MG/ML
SOLUTION TOPICAL
Qty: 60 ML | Refills: 12 | Status: SHIPPED | OUTPATIENT
Start: 2019-10-07 | End: 2021-09-29

## 2019-10-20 NOTE — PROGRESS NOTES
Harper Old is a 29year old female. Patient presents with:  Lesion: LOV 7/12/2019 Pt presenting for skin tags under left eye. Pt tried home remedy (acid). Pt also c/o bald patches on scalp.   Pt requestion refill for Triamcinolone for dermati 3  Adapalene-Benzoyl Peroxide (EPIDUO) 0.1-2.5 % External Gel, Use bid to affected areas of face, chest, back as directed, Disp: 45 g, Rfl: 6  triamcinolone acetonide 0.1 % External Cream, Apply topically 2 (two) times daily. , Disp: 1 Tube, Rfl: 0  hydroco Sexual activity: Yes        Partners: Male    Lifestyle      Physical activity:        Days per week: Not on file        Minutes per session: Not on file      Stress: Not on file    Relationships      Social connections:        Talks on phone: Not on file concerns. Acne has been doing reasonably well using topicals not on anything oral.  Hair loss has been progressively worsening decreased density falling out. Over the crown and anterior scalp. Progressively worsening.   Patient very concerned regarding t diagnosis)  Alopecia  Milia  Benign neoplasm of skin of face    Assessment / plan: Alopecia may be multifactorial.  Suspect underlying female pattern alopecia, androgenetic alopecia. Discussed checking labs. Follow-up with PCP.   Reviewed labs availabl with patient. Therapeutic options reviewed. See  Medications in grid. Instructions reviewed at length. General skin care questions answered. Reassurance regarding benign skin lesions. Signs and symptoms of skin cancer, ABCDE's of melanoma briefly r

## 2020-01-20 ENCOUNTER — OFFICE VISIT (OUTPATIENT)
Dept: INTERNAL MEDICINE CLINIC | Facility: CLINIC | Age: 35
End: 2020-01-20
Payer: MEDICAID

## 2020-01-20 VITALS
SYSTOLIC BLOOD PRESSURE: 125 MMHG | WEIGHT: 133.88 LBS | HEIGHT: 63 IN | BODY MASS INDEX: 23.72 KG/M2 | TEMPERATURE: 98 F | HEART RATE: 89 BPM | DIASTOLIC BLOOD PRESSURE: 89 MMHG

## 2020-01-20 DIAGNOSIS — J06.9 ACUTE URI: Primary | ICD-10-CM

## 2020-01-20 PROCEDURE — 99213 OFFICE O/P EST LOW 20 MIN: CPT | Performed by: INTERNAL MEDICINE

## 2020-01-20 RX ORDER — AMOXICILLIN 875 MG/1
875 TABLET, COATED ORAL 2 TIMES DAILY
Qty: 14 TABLET | Refills: 0 | Status: SHIPPED | OUTPATIENT
Start: 2020-01-20 | End: 2020-01-27

## 2020-01-21 NOTE — PROGRESS NOTES
Minal Walden is a 29year old female.  Patient presents with:  Cough    HPI:   About 6 days ago, she developed symptoms of fever of 102 degrees, achiness, nasal congestion with yellow drainage, sinus pressure, bilateral ear pain, sore throat and cough and breathing comfortably in no distress  /89 (BP Location: Left arm, Patient Position: Sitting, Cuff Size: adult)   Pulse 89   Temp 98 °F (36.7 °C) (Oral)   Ht 5' 3\" (1.6 m)   Wt 133 lb 14.4 oz (60.7 kg)   BMI 23.72 kg/m²   HEENT: Anicteric, conjun

## 2020-01-21 NOTE — PATIENT INSTRUCTIONS
Take amoxicillin 875 mg twice daily for 7 days. Treat symptoms with Tylenol, Sudafed, warm salt water gargles and Robitussin as needed. Call if no better.

## 2020-05-13 ENCOUNTER — TELEPHONE (OUTPATIENT)
Dept: FAMILY MEDICINE CLINIC | Facility: CLINIC | Age: 35
End: 2020-05-13

## 2020-05-13 RX ORDER — FLUCONAZOLE 150 MG/1
150 TABLET ORAL ONCE
Qty: 1 TABLET | Refills: 0 | Status: SHIPPED | OUTPATIENT
Start: 2020-05-13 | End: 2020-05-13

## 2020-05-13 NOTE — TELEPHONE ENCOUNTER
Spoke to patient. C/o mainly discharge and itching. Has tried Monistat. [id number] Celi jesus MD to authorize Diflucan one time dose. If not better by Friday, to call office back and would need in-office evaluation.

## 2020-05-13 NOTE — TELEPHONE ENCOUNTER
Pt called stating that since 2 months ago, she started with white discharge, no smell, but vaginal itchiness. She also has a bit of pain in the pelvic bone area, and headaches.    Pt wants to come in and see the doctor or if she can recommend something for

## 2020-06-15 ENCOUNTER — VIRTUAL PHONE E/M (OUTPATIENT)
Dept: FAMILY MEDICINE CLINIC | Facility: CLINIC | Age: 35
End: 2020-06-15
Payer: MEDICAID

## 2020-06-15 ENCOUNTER — LAB ENCOUNTER (OUTPATIENT)
Dept: LAB | Facility: HOSPITAL | Age: 35
End: 2020-06-15
Attending: FAMILY MEDICINE
Payer: MEDICAID

## 2020-06-15 DIAGNOSIS — N89.8 VAGINAL DISCHARGE: Primary | ICD-10-CM

## 2020-06-15 DIAGNOSIS — Z11.3 SCREEN FOR STD (SEXUALLY TRANSMITTED DISEASE): ICD-10-CM

## 2020-06-15 DIAGNOSIS — N89.8 VAGINAL DISCHARGE: ICD-10-CM

## 2020-06-15 DIAGNOSIS — Z20.828 EXPOSURE TO HERPES SIMPLEX VIRUS (HSV): ICD-10-CM

## 2020-06-15 PROCEDURE — 86706 HEP B SURFACE ANTIBODY: CPT

## 2020-06-15 PROCEDURE — 87491 CHLMYD TRACH DNA AMP PROBE: CPT

## 2020-06-15 PROCEDURE — 86704 HEP B CORE ANTIBODY TOTAL: CPT

## 2020-06-15 PROCEDURE — 87340 HEPATITIS B SURFACE AG IA: CPT

## 2020-06-15 PROCEDURE — 86696 HERPES SIMPLEX TYPE 2 TEST: CPT

## 2020-06-15 PROCEDURE — 87389 HIV-1 AG W/HIV-1&-2 AB AG IA: CPT

## 2020-06-15 PROCEDURE — 86780 TREPONEMA PALLIDUM: CPT

## 2020-06-15 PROCEDURE — 80500 HEPATITIS B PROFILE: CPT

## 2020-06-15 PROCEDURE — 86803 HEPATITIS C AB TEST: CPT

## 2020-06-15 PROCEDURE — 36415 COLL VENOUS BLD VENIPUNCTURE: CPT

## 2020-06-15 PROCEDURE — 99213 OFFICE O/P EST LOW 20 MIN: CPT | Performed by: FAMILY MEDICINE

## 2020-06-15 PROCEDURE — 86695 HERPES SIMPLEX TYPE 1 TEST: CPT

## 2020-06-15 PROCEDURE — 87591 N.GONORRHOEAE DNA AMP PROB: CPT

## 2020-06-15 NOTE — PROGRESS NOTES
Virtual Telephone Check-In    Shakira Wei verbally consents to a Virtual/Telephone Check-In visit on 06/15/20. Patient has been referred to the [facility] website at [email or web address] to review the yearly Consent to Treat document.     Patient janice Exposure to HSV  Pt's boyfriend tested positive for HSV-2 2 weeks ago. Pt is asymptomatic right now, but requesting testing. Advised that this test can only show is she has had past infection, but not that she has current active infection present.

## 2020-06-15 NOTE — PATIENT INSTRUCTIONS
Understanding STIs    When it comes to sex, nothing is risk-free. Any sexual contact with the penis, vagina, anus, or mouth can spread a sexually transmitted infection (STI). These include chlamydia, gonorrhea, herpes, HIV, and genital warts.  STIs are al · When ready to withdraw, hold the rim of the condom as the penis pulls out. This prevents the condom from slipping off. · Check the expiration date before using a condom.   · Don’t store condoms in places that can get hot, such as a car or a wallet that i

## 2020-06-17 NOTE — PROGRESS NOTES
STD panel NEG so far, awaiting Treponema and HSV results. Immune to Hep B. Pt notified of results so far and pending labs.

## 2020-06-26 ENCOUNTER — TELEPHONE (OUTPATIENT)
Dept: FAMILY MEDICINE CLINIC | Facility: CLINIC | Age: 35
End: 2020-06-26

## 2020-06-26 NOTE — TELEPHONE ENCOUNTER
Patient called looking for lab results. She is aware and has seen Dr. Phillip Garcia' message on [employer].     Patient Result Comments     HIV AG AB COMBO LAVENDER HOLD     Viewed by Ella Gomez on 6/26/2020  1:21 AM   Written by Joie Rosas MD o

## 2020-06-29 ENCOUNTER — HOSPITAL ENCOUNTER (OUTPATIENT)
Age: 35
Discharge: HOME | End: 2020-06-29
Attending: EMERGENCY MEDICINE
Payer: MEDICAID

## 2020-06-29 VITALS
RESPIRATION RATE: 20 BRPM | OXYGEN SATURATION: 99 % | SYSTOLIC BLOOD PRESSURE: 131 MMHG | HEART RATE: 98 BPM | TEMPERATURE: 99 F | DIASTOLIC BLOOD PRESSURE: 95 MMHG

## 2020-06-29 DIAGNOSIS — B37.3 CANDIDA VAGINITIS: Primary | ICD-10-CM

## 2020-06-29 LAB
B-HCG UR QL: NEGATIVE
BILIRUB UR QL STRIP: NEGATIVE
GLUCOSE UR STRIP-MCNC: NEGATIVE MG/DL
HGB UR QL STRIP: NEGATIVE
KETONES UR STRIP-MCNC: NEGATIVE MG/DL
LEUKOCYTE ESTERASE UR QL STRIP: NEGATIVE
NITRITE UR QL STRIP: NEGATIVE
PH UR STRIP: 5.5 [PH]
PROT UR STRIP-MCNC: NEGATIVE MG/DL
SP GR UR STRIP: >=1.03
UROBILINOGEN UR STRIP-ACNC: <2 MG/DL

## 2020-06-29 PROCEDURE — 87106 FUNGI IDENTIFICATION YEAST: CPT | Performed by: EMERGENCY MEDICINE

## 2020-06-29 PROCEDURE — 81002 URINALYSIS NONAUTO W/O SCOPE: CPT

## 2020-06-29 PROCEDURE — 81025 URINE PREGNANCY TEST: CPT

## 2020-06-29 PROCEDURE — 99204 OFFICE O/P NEW MOD 45 MIN: CPT

## 2020-06-29 PROCEDURE — 87591 N.GONORRHOEAE DNA AMP PROB: CPT | Performed by: EMERGENCY MEDICINE

## 2020-06-29 PROCEDURE — 87491 CHLMYD TRACH DNA AMP PROBE: CPT | Performed by: EMERGENCY MEDICINE

## 2020-06-29 PROCEDURE — 87808 TRICHOMONAS ASSAY W/OPTIC: CPT | Performed by: EMERGENCY MEDICINE

## 2020-06-29 PROCEDURE — 87086 URINE CULTURE/COLONY COUNT: CPT | Performed by: EMERGENCY MEDICINE

## 2020-06-29 PROCEDURE — 87205 SMEAR GRAM STAIN: CPT | Performed by: EMERGENCY MEDICINE

## 2020-06-29 PROCEDURE — 99214 OFFICE O/P EST MOD 30 MIN: CPT

## 2020-06-29 RX ORDER — FLUCONAZOLE 200 MG/1
200 TABLET ORAL DAILY
Qty: 2 TABLET | Refills: 0 | Status: SHIPPED | OUTPATIENT
Start: 2020-06-29 | End: 2020-07-13

## 2020-06-29 NOTE — ED PROVIDER NOTES
Patient Seen in: [facility] Care In [address] [place] [address]    History   Patient presents with:  Ignacio-TITUS    Stated Complaint: urinary symptoms    HPI    Patient complains of vaginal discharge that began couple of days ago    Patient denies abdominal zeus Comment: None. Review of Systems    Positive for stated complaint: urinary symptoms  Other systems are as noted in HPI. Constitutional and vital signs reviewed. All other systems reviewed and negative except as noted above.     PSFH elements r 0

## 2020-06-29 NOTE — TELEPHONE ENCOUNTER
Viewed by Natividad Cuellar on 6/26/2020  2:42 PM   Written by Irineo Watts MD on 6/26/2020 12:13 PM   Joseph Andersen,     The HSV-1 test came back positive (this is the oral type of herpes virus).  So this does not mean that you have an active o

## 2020-06-30 LAB
C TRACH DNA SPEC QL NAA+PROBE: NEGATIVE
N GONORRHOEA DNA SPEC QL NAA+PROBE: NEGATIVE

## 2020-07-02 LAB
GENITAL VAGINOSIS SCREEN: NEGATIVE
TRICHOMONAS SCREEN: NEGATIVE

## 2020-07-06 ENCOUNTER — TELEPHONE (OUTPATIENT)
Dept: FAMILY MEDICINE CLINIC | Facility: CLINIC | Age: 35
End: 2020-07-06

## 2020-07-06 NOTE — TELEPHONE ENCOUNTER
Unable to leave voicemail as mailbox is full. Need to know current symptoms.   If patient is agreeable, to schedule her in with Dr. Nayely Wilder on Tuesday 7/14 at 9:40am.    In the interim, avoid scented feminine hygiene products, increase intake of yogurt/

## 2020-07-06 NOTE — TELEPHONE ENCOUNTER
Patient is calling in regards to visit she had at the [facility] 6/29. She states she is not better and continues with same symptoms. She wants to be seen by Dr. Miky Singletonor. . Does not want to be scheduled with Dr. Rico Martinez again. Please advice.

## 2020-07-08 NOTE — TELEPHONE ENCOUNTER
Called patient and offered appointment for tomorrow 07/09/2020. Patient stated she is not able to come in until after 4pm due to work.

## 2020-07-10 NOTE — TELEPHONE ENCOUNTER
Called patient; unable to leave voicemail. Mailbox full. Given that she has restrictions with scheduling and Dr. Momo Hunt fully booked, if she needs to be seen urgently or if symptoms persist, she is to return to urgent care or ER (if needed).   Unless s

## 2020-07-10 NOTE — TELEPHONE ENCOUNTER
PT called back to let us know that she will be calling her insurance to change providers since Dr Bi Baum is very booked. We were unable to sched pt with Dr Theresa Cox since he is currently not credentialed with [facility].

## 2020-07-13 ENCOUNTER — OFFICE VISIT (OUTPATIENT)
Dept: OBGYN CLINIC | Facility: CLINIC | Age: 35
End: 2020-07-13
Payer: MEDICAID

## 2020-07-13 VITALS
HEART RATE: 79 BPM | WEIGHT: 153.38 LBS | SYSTOLIC BLOOD PRESSURE: 130 MMHG | DIASTOLIC BLOOD PRESSURE: 88 MMHG | BODY MASS INDEX: 27 KG/M2

## 2020-07-13 DIAGNOSIS — N89.8 VAGINAL DISCHARGE: Primary | ICD-10-CM

## 2020-07-13 PROCEDURE — 99201 OFFICE/OUTPT VISIT,NEW,LEVL I: CPT | Performed by: ADVANCED PRACTICE MIDWIFE

## 2020-07-14 NOTE — PROGRESS NOTES
HPI:    Patient ID: Jaden Acosta is a 28year old female. Patient presents with concerns of large amount of white discharge.   She recently reported itching and was treated in urgent care for candida though  cultures were later found to be negative tenderness and no fullness. No vaginal discharge, erythema, tenderness or bleeding. No erythema, tenderness or bleeding in the vagina. No foreign body in the vagina. No signs of injury in the vagina.       Genitourinary Comments: Small amount o

## 2020-07-16 LAB
GENITAL VAGINOSIS SCREEN: NEGATIVE
TRICHOMONAS SCREEN: NEGATIVE

## 2020-09-16 ENCOUNTER — TELEPHONE (OUTPATIENT)
Dept: DERMATOLOGY CLINIC | Facility: CLINIC | Age: 35
End: 2020-09-16

## 2020-09-16 ENCOUNTER — OFFICE VISIT (OUTPATIENT)
Dept: DERMATOLOGY CLINIC | Facility: CLINIC | Age: 35
End: 2020-09-16
Payer: MEDICAID

## 2020-09-16 ENCOUNTER — OFFICE VISIT (OUTPATIENT)
Dept: FAMILY MEDICINE CLINIC | Facility: CLINIC | Age: 35
End: 2020-09-16
Payer: MEDICAID

## 2020-09-16 VITALS
HEART RATE: 82 BPM | DIASTOLIC BLOOD PRESSURE: 76 MMHG | OXYGEN SATURATION: 99 % | RESPIRATION RATE: 18 BRPM | TEMPERATURE: 98 F | HEIGHT: 63 IN | SYSTOLIC BLOOD PRESSURE: 120 MMHG | WEIGHT: 159.19 LBS | BODY MASS INDEX: 28.21 KG/M2

## 2020-09-16 DIAGNOSIS — Z13.6 SCREENING FOR CARDIOVASCULAR CONDITION: ICD-10-CM

## 2020-09-16 DIAGNOSIS — Z13.0 SCREENING FOR ENDOCRINE, METABOLIC AND IMMUNITY DISORDER: ICD-10-CM

## 2020-09-16 DIAGNOSIS — Z13.0 SCREENING, ANEMIA, DEFICIENCY, IRON: ICD-10-CM

## 2020-09-16 DIAGNOSIS — Z00.00 ENCOUNTER FOR ROUTINE ADULT HEALTH EXAMINATION WITHOUT ABNORMAL FINDINGS: Primary | ICD-10-CM

## 2020-09-16 DIAGNOSIS — Z13.228 SCREENING FOR ENDOCRINE, METABOLIC AND IMMUNITY DISORDER: ICD-10-CM

## 2020-09-16 DIAGNOSIS — N92.1 MENORRHAGIA WITH IRREGULAR CYCLE: ICD-10-CM

## 2020-09-16 DIAGNOSIS — Z28.21 INFLUENZA VACCINATION DECLINED: ICD-10-CM

## 2020-09-16 DIAGNOSIS — Z13.29 SCREENING FOR ENDOCRINE, METABOLIC AND IMMUNITY DISORDER: ICD-10-CM

## 2020-09-16 DIAGNOSIS — L70.0 ACNE VULGARIS: Primary | ICD-10-CM

## 2020-09-16 PROCEDURE — 3074F SYST BP LT 130 MM HG: CPT | Performed by: FAMILY MEDICINE

## 2020-09-16 PROCEDURE — 99395 PREV VISIT EST AGE 18-39: CPT | Performed by: FAMILY MEDICINE

## 2020-09-16 PROCEDURE — 3008F BODY MASS INDEX DOCD: CPT | Performed by: FAMILY MEDICINE

## 2020-09-16 PROCEDURE — 99213 OFFICE O/P EST LOW 20 MIN: CPT | Performed by: DERMATOLOGY

## 2020-09-16 PROCEDURE — 3078F DIAST BP <80 MM HG: CPT | Performed by: FAMILY MEDICINE

## 2020-09-16 RX ORDER — FLUCONAZOLE 150 MG/1
TABLET ORAL
COMMUNITY
Start: 2020-05-13 | End: 2020-09-16 | Stop reason: ALTCHOICE

## 2020-09-16 RX ORDER — CLINDAMYCIN PHOSPHATE 10 MG/ML
1 LOTION TOPICAL 2 TIMES DAILY
Qty: 60 ML | Refills: 11 | Status: SHIPPED | OUTPATIENT
Start: 2020-09-16 | End: 2020-09-16

## 2020-09-16 RX ORDER — QUETIAPINE 50 MG/1
50 TABLET, FILM COATED ORAL NIGHTLY
COMMUNITY
End: 2020-09-16 | Stop reason: ALTCHOICE

## 2020-09-16 RX ORDER — FLUOROMETHOLONE 0.1 %
SUSPENSION, DROPS(FINAL DOSAGE FORM)(ML) OPHTHALMIC (EYE)
COMMUNITY
Start: 2020-09-10 | End: 2020-10-29 | Stop reason: ALTCHOICE

## 2020-09-16 RX ORDER — AMOXICILLIN 500 MG/1
500 CAPSULE ORAL 3 TIMES DAILY
Qty: 60 CAPSULE | Refills: 0 | Status: SHIPPED | OUTPATIENT
Start: 2020-09-16 | End: 2020-10-29 | Stop reason: ALTCHOICE

## 2020-09-16 RX ORDER — ALPRAZOLAM 0.5 MG/1
0.5 TABLET ORAL
COMMUNITY
End: 2020-09-16 | Stop reason: ALTCHOICE

## 2020-09-16 RX ORDER — CLINDAMYCIN PHOSPHATE 10 MG/ML
1 LOTION TOPICAL 2 TIMES DAILY
Qty: 60 ML | Refills: 11 | Status: SHIPPED | OUTPATIENT
Start: 2020-09-16 | End: 2020-10-16

## 2020-09-16 NOTE — TELEPHONE ENCOUNTER
Fax from [employer]covermymeds    Pa started for Clindamycin Phosphate 1 % External Lotion    KEY: [id number]    Placed in pa inbox

## 2020-09-17 NOTE — PROGRESS NOTES
Patient presents with:  Physical: annual physical exam; not fasting       HPI:   Rashaad De La Torre is a 28year old female who presents for a complete physical without gyne exam.   Patient feels well, dental visit up to date, no hearing problem.   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 ABDOMEN: is soft,NBS, NT/ND with no HSM. No rebound or guarding. No CVA tenderness, no hernias.    EXAM: deferred  RECTAL EXAM: deferred  NEURO: Cranial nerves II-XII normal,no focal abnor understanding of these issues and agrees to the plan. Return for pending lab results.

## 2020-09-20 NOTE — PROGRESS NOTES
Vinh Jaime is a 28year old female. Patient presents with:  Acne: established pt. presents for 1 year F/U for acne. \"It's worse with the mask\".  Pt on adapalene-BPO every 2 days, states it does cause some dryness if she uses it daily 0.05 % External Solution Use qd to scalp 60 mL 12   • fluorometholone 0.1 % Ophthalmic Suspension SHAKE BOTTLE AND INSTILL 1 DROP IN OS QID FOR 1 WEEK       Allergies:     Minocycline                 Comment:Other reaction(s): Rash  Pollen organizations: Not on file        Relationship status: Not on file      Intimate partner violence        Fear of current or ex partner: Not on file        Emotionally abused: Not on file        Physically abused: Not on file        Forced sexual activity: sweats, sensitivity to the sun, deeper lumps or bumps. No other skin complaints. History, medications, allergies as noted. Physical examination: Patient  well-developed well-nourished, alert oriented in no acute distress.   Exam of involved, appropriat to return as noted in follow-up /as noted above    This note was generated using Dragon voice recognition software. Please contact me regarding any confusion resulting from errors in recognition.     No orders of the defined types were placed in this encou

## 2020-10-01 ENCOUNTER — LAB ENCOUNTER (OUTPATIENT)
Dept: LAB | Facility: HOSPITAL | Age: 35
End: 2020-10-01
Attending: FAMILY MEDICINE
Payer: MEDICAID

## 2020-10-01 DIAGNOSIS — Z13.228 SCREENING FOR ENDOCRINE, METABOLIC AND IMMUNITY DISORDER: ICD-10-CM

## 2020-10-01 DIAGNOSIS — N92.1 MENORRHAGIA WITH IRREGULAR CYCLE: ICD-10-CM

## 2020-10-01 DIAGNOSIS — Z00.00 ENCOUNTER FOR ROUTINE ADULT HEALTH EXAMINATION WITHOUT ABNORMAL FINDINGS: ICD-10-CM

## 2020-10-01 DIAGNOSIS — Z13.29 SCREENING FOR ENDOCRINE, METABOLIC AND IMMUNITY DISORDER: ICD-10-CM

## 2020-10-01 DIAGNOSIS — Z13.0 SCREENING, ANEMIA, DEFICIENCY, IRON: ICD-10-CM

## 2020-10-01 DIAGNOSIS — Z13.0 SCREENING FOR ENDOCRINE, METABOLIC AND IMMUNITY DISORDER: ICD-10-CM

## 2020-10-01 DIAGNOSIS — Z13.6 SCREENING FOR CARDIOVASCULAR CONDITION: ICD-10-CM

## 2020-10-01 PROCEDURE — 36415 COLL VENOUS BLD VENIPUNCTURE: CPT

## 2020-10-01 PROCEDURE — 80053 COMPREHEN METABOLIC PANEL: CPT

## 2020-10-01 PROCEDURE — 84443 ASSAY THYROID STIM HORMONE: CPT

## 2020-10-01 PROCEDURE — 80061 LIPID PANEL: CPT

## 2020-10-01 PROCEDURE — 85025 COMPLETE CBC W/AUTO DIFF WBC: CPT

## 2020-10-01 NOTE — PROGRESS NOTES
Pt notified via [employer]:    All labs are essentially normal. Rpt in 1 year at annual physical.

## 2020-10-11 ENCOUNTER — HOSPITAL ENCOUNTER (OUTPATIENT)
Dept: ULTRASOUND IMAGING | Age: 35
Discharge: HOME | End: 2020-10-11
Attending: FAMILY MEDICINE
Payer: MEDICAID

## 2020-10-11 DIAGNOSIS — N92.1 MENORRHAGIA WITH IRREGULAR CYCLE: ICD-10-CM

## 2020-10-11 PROCEDURE — 76830 TRANSVAGINAL US NON-OB: CPT | Performed by: FAMILY MEDICINE

## 2020-10-19 ENCOUNTER — TELEPHONE (OUTPATIENT)
Dept: FAMILY MEDICINE CLINIC | Facility: CLINIC | Age: 35
End: 2020-10-19

## 2020-10-19 NOTE — TELEPHONE ENCOUNTER
Violeta Reyes MD   10/12/2020  8:26 AM      Pt notified via [employer]:  US Pelvis shows ruptured cyst of right ovary.          Written by Violeta Reyes MD on 10/12/2020  8:26 AM  Joseph Briceño,     The ultrasound of your pelvis is showing

## 2020-10-29 ENCOUNTER — OFFICE VISIT (OUTPATIENT)
Dept: FAMILY MEDICINE CLINIC | Facility: CLINIC | Age: 35
End: 2020-10-29
Payer: MEDICAID

## 2020-10-29 VITALS
DIASTOLIC BLOOD PRESSURE: 74 MMHG | HEIGHT: 63 IN | WEIGHT: 159 LBS | TEMPERATURE: 97 F | SYSTOLIC BLOOD PRESSURE: 120 MMHG | RESPIRATION RATE: 16 BRPM | OXYGEN SATURATION: 97 % | HEART RATE: 86 BPM | BODY MASS INDEX: 28.17 KG/M2

## 2020-10-29 DIAGNOSIS — Z30.011 ENCOUNTER FOR BCP (BIRTH CONTROL PILLS) INITIAL PRESCRIPTION: ICD-10-CM

## 2020-10-29 DIAGNOSIS — N83.201 CYST OF RIGHT OVARY: Primary | ICD-10-CM

## 2020-10-29 PROCEDURE — 3078F DIAST BP <80 MM HG: CPT | Performed by: FAMILY MEDICINE

## 2020-10-29 PROCEDURE — 81025 URINE PREGNANCY TEST: CPT | Performed by: FAMILY MEDICINE

## 2020-10-29 PROCEDURE — 99214 OFFICE O/P EST MOD 30 MIN: CPT | Performed by: FAMILY MEDICINE

## 2020-10-29 PROCEDURE — 3074F SYST BP LT 130 MM HG: CPT | Performed by: FAMILY MEDICINE

## 2020-10-29 PROCEDURE — 3008F BODY MASS INDEX DOCD: CPT | Performed by: FAMILY MEDICINE

## 2020-10-29 RX ORDER — NORETHINDRONE ACETATE AND ETHINYL ESTRADIOL, ETHINYL ESTRADIOL AND FERROUS FUMARATE 1MG-10(24)
1 KIT ORAL DAILY
Qty: 3 PACKAGE | Refills: 1 | Status: SHIPPED | OUTPATIENT
Start: 2020-10-29 | End: 2020-11-26

## 2020-10-29 NOTE — PROGRESS NOTES
CHIEF COMPLAINT: Patient presents with: Other: Ultrasound results follow up        HPI:     Ella Gomez is a 28year old female presents for US results.     US results:  Pt had US completed since she was having heavy periods, but with regular cycles Alcohol/week: 0.0 standard drinks    Drug use: No      Comment: None.         Medications (Active prior to today's visit):  Current Outpatient Medications   Medication Sig Dispense Refill   • Norethin-Eth Estrad-Fe Biphas (LO LOESTRIN FE) 1 MG-10 MCG / 10 M Control Line Present wit* 10/29/2020 yes    • Kit Lot # 10/29/2020 [id number]    • Kit Expiration Date 10/29/2020 9-    [facility] Lab Encounter on 10/01/2020   Component Date Value   • TSH 10/01/2020 2.350    • Glucose 10/01/2020 89    • Sodium 10/01/2020 with    1.  Cyst of right ovary  - As seen on Sept 2020 US, likely a ruptured cyst  - pt states this and her periods are heavy and painful, she is amenable to starting OCPs  - start Lo Loestrin, R/B/SEs of new med d/w pt  - she is not a smoker  - urine hcg:

## 2020-10-29 NOTE — PATIENT INSTRUCTIONS
Ovarian Cysts  A cyst is usually a fluid-filled sac, like a small water balloon. Cysts are almost always harmless, and many go away on their own. Usually they grow slowly. They can vary in size from as small as a pea to larger than a grapefruit.  Many cau These cysts can invade other tissues or spread to other parts of the body. Kvng last reviewed this educational content on 6/1/2017  © 8536-9685 [employer]. [address] [facility], [address] [place] [address]. All rights reserved.  This information

## 2020-11-09 ENCOUNTER — HOSPITAL ENCOUNTER (OUTPATIENT)
Age: 35
Discharge: ANOTHER HOSPITAL | End: 2020-11-09
Attending: EMERGENCY MEDICINE
Payer: MEDICAID

## 2020-11-09 ENCOUNTER — APPOINTMENT (OUTPATIENT)
Dept: GENERAL RADIOLOGY | Facility: HOSPITAL | Age: 35
End: 2020-11-09
Payer: MEDICAID

## 2020-11-09 ENCOUNTER — HOSPITAL ENCOUNTER (EMERGENCY)
Facility: HOSPITAL | Age: 35
Discharge: HOME | End: 2020-11-09
Attending: EMERGENCY MEDICINE
Payer: MEDICAID

## 2020-11-09 VITALS
HEART RATE: 68 BPM | DIASTOLIC BLOOD PRESSURE: 89 MMHG | TEMPERATURE: 98 F | SYSTOLIC BLOOD PRESSURE: 125 MMHG | OXYGEN SATURATION: 100 % | RESPIRATION RATE: 18 BRPM

## 2020-11-09 VITALS
RESPIRATION RATE: 18 BRPM | TEMPERATURE: 97 F | SYSTOLIC BLOOD PRESSURE: 128 MMHG | OXYGEN SATURATION: 98 % | HEART RATE: 84 BPM | DIASTOLIC BLOOD PRESSURE: 93 MMHG

## 2020-11-09 DIAGNOSIS — R09.1 PLEURISY: Primary | ICD-10-CM

## 2020-11-09 DIAGNOSIS — R07.9 CHEST PAIN OF UNCERTAIN ETIOLOGY: Primary | ICD-10-CM

## 2020-11-09 PROCEDURE — 99214 OFFICE O/P EST MOD 30 MIN: CPT

## 2020-11-09 PROCEDURE — 93005 ELECTROCARDIOGRAM TRACING: CPT

## 2020-11-09 PROCEDURE — 99285 EMERGENCY DEPT VISIT HI MDM: CPT

## 2020-11-09 PROCEDURE — 71045 X-RAY EXAM CHEST 1 VIEW: CPT | Performed by: EMERGENCY MEDICINE

## 2020-11-09 PROCEDURE — 93010 ELECTROCARDIOGRAM REPORT: CPT | Performed by: EMERGENCY MEDICINE

## 2020-11-09 PROCEDURE — 85025 COMPLETE CBC W/AUTO DIFF WBC: CPT | Performed by: EMERGENCY MEDICINE

## 2020-11-09 PROCEDURE — 93010 ELECTROCARDIOGRAM REPORT: CPT

## 2020-11-09 PROCEDURE — 80048 BASIC METABOLIC PNL TOTAL CA: CPT | Performed by: EMERGENCY MEDICINE

## 2020-11-09 PROCEDURE — 96374 THER/PROPH/DIAG INJ IV PUSH: CPT

## 2020-11-09 PROCEDURE — 81025 URINE PREGNANCY TEST: CPT

## 2020-11-09 PROCEDURE — 85379 FIBRIN DEGRADATION QUANT: CPT | Performed by: EMERGENCY MEDICINE

## 2020-11-09 RX ORDER — KETOROLAC TROMETHAMINE 10 MG/1
10 TABLET, FILM COATED ORAL EVERY 6 HOURS PRN
Qty: 15 TABLET | Refills: 0 | Status: SHIPPED | OUTPATIENT
Start: 2020-11-09 | End: 2020-11-16

## 2020-11-09 RX ORDER — KETOROLAC TROMETHAMINE 15 MG/ML
15 INJECTION, SOLUTION INTRAMUSCULAR; INTRAVENOUS ONCE
Status: COMPLETED | OUTPATIENT
Start: 2020-11-09 | End: 2020-11-09

## 2020-11-09 NOTE — ED NOTES
NO EKG was completed upon arrival due to being sent here from , previous EKG was printed and shown to MD Lisa Vergara, Per MD Hemphill no EKG to be repeated.

## 2020-11-09 NOTE — ED INITIAL ASSESSMENT (HPI)
Pt reports left lower chest pain that worsens when breathing.  Pt states it began today and reports taking birth control

## 2020-11-09 NOTE — ED PROVIDER NOTES
Patient Seen in: [facility] Emergency Department    History   Patient presents with:  Chest Pain Angina    Stated Complaint: cp sent from [facility]    HPI    28year old female presenting with chest pain. Pain began this morning .  The patient describes signs reviewed. All other systems reviewed and negative except as noted above. PSFH elements reviewed from today and agreed except as otherwise stated in HPI.     Physical Exam     ED Triage Vitals [11/09/20 1359]   BP (!) 143/96   Pulse 80   Resp 1 -----------         ------                     CBC W/ DIFFERENTIAL[[id number]]                              Final result                 Please view results for these tests on the individual orders.    RAINBOW DRAW BLUE   RAINBOW DRAW GOLD   CBC W/ DIFFERENT Oral Tab  Take 1 tablet (10 mg total) by mouth every 6 (six) hours as needed for Pain.   Qty: 15 tablet Refills: 0                           Disposition and Plan     Clinical Impression:  Pleurisy  (primary encounter diagnosis)    Disposition:  Discharge

## 2020-11-09 NOTE — ED NOTES
Pt to ED with boyfriend from [address] [place] [id number] with c/o left sided chest pain that started when she woke up today. Pt c/o pain with inspiration. Pt admits to OCP. Pt denies cough or fever. No respiratory distress noted. 98% on room air. Lungs clear bilaterally.  Pt skin w

## 2020-11-11 ENCOUNTER — TELEPHONE (OUTPATIENT)
Dept: FAMILY MEDICINE CLINIC | Facility: CLINIC | Age: 35
End: 2020-11-11

## 2020-11-11 NOTE — TELEPHONE ENCOUNTER
Called patient to make Follow up visit from ED ER on 11/09/2020, due to Pleurisy. pts phone voicemail was full.  Unable to leave msg

## 2020-11-12 NOTE — ED PROVIDER NOTES
Patient Seen in: [facility]      History   Patient presents with:  Chest Pain    Stated Complaint: chest pain    HPI    29 yo female with chest pain that started this morning. Pain with deep breath. No recent illness.  Does not feel short of b Normal range of motion and neck supple. Cardiovascular:      Rate and Rhythm: Normal rate and regular rhythm. Heart sounds: Normal heart sounds. Pulmonary:      Effort: Pulmonary effort is normal. No respiratory distress.    Skin:     General: Skin

## 2021-01-20 ENCOUNTER — HOSPITAL ENCOUNTER (OUTPATIENT)
Age: 36
Discharge: HOME | End: 2021-01-20
Payer: MEDICAID

## 2021-01-20 VITALS
OXYGEN SATURATION: 97 % | HEIGHT: 62 IN | TEMPERATURE: 98 F | BODY MASS INDEX: 30.36 KG/M2 | HEART RATE: 70 BPM | SYSTOLIC BLOOD PRESSURE: 127 MMHG | RESPIRATION RATE: 18 BRPM | DIASTOLIC BLOOD PRESSURE: 94 MMHG | WEIGHT: 165 LBS

## 2021-01-20 DIAGNOSIS — R11.2 NAUSEA AND VOMITING, INTRACTABILITY OF VOMITING NOT SPECIFIED, UNSPECIFIED VOMITING TYPE: Primary | ICD-10-CM

## 2021-01-20 LAB
#MXD IC: 0.4 X10ˆ3/UL (ref 0.1–1)
B-HCG UR QL: NEGATIVE
BILIRUB UR QL STRIP: NEGATIVE
COLOR UR: YELLOW
CREAT BLD-MCNC: 0.6 MG/DL (ref 0.55–1.02)
GLUCOSE BLD-MCNC: 78 MG/DL (ref 70–99)
GLUCOSE UR STRIP-MCNC: NEGATIVE MG/DL
HCT VFR BLD AUTO: 43.2 %
HGB BLD-MCNC: 14.6 G/DL
HGB UR QL STRIP: NEGATIVE
ISTAT BUN: 12 MG/DL (ref 7–18)
ISTAT CHLORIDE: 105 MMOL/L (ref 98–112)
ISTAT HEMATOCRIT: 47 %
ISTAT IONIZED CALCIUM FOR CHEM 8: 1.17 MMOL/L (ref 1.12–1.32)
ISTAT POTASSIUM: 3.8 MMOL/L (ref 3.6–5.1)
ISTAT SODIUM: 138 MMOL/L (ref 136–145)
ISTAT TCO2: 25 MMOL/L (ref 21–32)
KETONES UR STRIP-MCNC: NEGATIVE MG/DL
LEUKOCYTE ESTERASE UR QL STRIP: NEGATIVE
LYMPHOCYTES # BLD AUTO: 2.2 X10ˆ3/UL (ref 1–4)
LYMPHOCYTES NFR BLD AUTO: 33.4 %
MCH RBC QN AUTO: 31.1 PG (ref 26–34)
MCHC RBC AUTO-ENTMCNC: 33.8 G/DL (ref 31–37)
MCV RBC AUTO: 91.9 FL (ref 80–100)
MIXED CELL %: 6.4 %
NEUTROPHILS # BLD AUTO: 4 X10ˆ3/UL (ref 1.5–7.7)
NEUTROPHILS NFR BLD AUTO: 60.2 %
NITRITE UR QL STRIP: NEGATIVE
PH UR STRIP: 8 [PH]
PLATELET # BLD AUTO: 300 X10ˆ3/UL (ref 150–450)
PROT UR STRIP-MCNC: NEGATIVE MG/DL
RBC # BLD AUTO: 4.7 X10ˆ6/UL
SP GR UR STRIP: 1.02
UROBILINOGEN UR STRIP-ACNC: <2 MG/DL
WBC # BLD AUTO: 6.6 X10ˆ3/UL (ref 4–11)

## 2021-01-20 PROCEDURE — 96365 THER/PROPH/DIAG IV INF INIT: CPT | Performed by: NURSE PRACTITIONER

## 2021-01-20 PROCEDURE — 81002 URINALYSIS NONAUTO W/O SCOPE: CPT | Performed by: NURSE PRACTITIONER

## 2021-01-20 PROCEDURE — 81025 URINE PREGNANCY TEST: CPT | Performed by: NURSE PRACTITIONER

## 2021-01-20 PROCEDURE — 96366 THER/PROPH/DIAG IV INF ADDON: CPT | Performed by: NURSE PRACTITIONER

## 2021-01-20 PROCEDURE — 85025 COMPLETE CBC W/AUTO DIFF WBC: CPT | Performed by: NURSE PRACTITIONER

## 2021-01-20 PROCEDURE — 36415 COLL VENOUS BLD VENIPUNCTURE: CPT | Performed by: NURSE PRACTITIONER

## 2021-01-20 PROCEDURE — 99213 OFFICE O/P EST LOW 20 MIN: CPT | Performed by: NURSE PRACTITIONER

## 2021-01-20 PROCEDURE — 80047 BASIC METABLC PNL IONIZED CA: CPT | Performed by: NURSE PRACTITIONER

## 2021-01-20 RX ORDER — ONDANSETRON 4 MG/1
4 TABLET, ORALLY DISINTEGRATING ORAL EVERY 4 HOURS PRN
Qty: 10 TABLET | Refills: 0 | Status: SHIPPED | OUTPATIENT
Start: 2021-01-20 | End: 2021-01-27

## 2021-01-20 RX ORDER — SODIUM CHLORIDE 9 MG/ML
1000 INJECTION, SOLUTION INTRAVENOUS ONCE
Status: COMPLETED | OUTPATIENT
Start: 2021-01-20 | End: 2021-01-20

## 2021-01-20 RX ORDER — ONDANSETRON 2 MG/ML
4 INJECTION INTRAMUSCULAR; INTRAVENOUS ONCE
Status: COMPLETED | OUTPATIENT
Start: 2021-01-20 | End: 2021-01-20

## 2021-01-20 NOTE — ED NOTES
ivf } intact infusion finished discharge inst reviewed prescriptions as directed f/u with pcp in office

## 2021-01-20 NOTE — ED PROVIDER NOTES
Patient Seen in: [facility]    History   Patient presents with:  Nausea/Vomiting/Diarrhea    Stated Complaint: dizzy/upset stomach/vomitted once    HPI  77-year-old female here today with complaints of nausea, vomiting x1 that started after e noted above. PSFH elements reviewed from today and agreed except as otherwise stated in HPI.     Physical Exam     ED Triage Vitals [01/20/21 1106]   BP (!) 155/120   Pulse 89   Resp 18   Temp 97.5 °F (36.4 °C)   Temp src Temporal   SpO2 97 %   O2 Device inflammation or perforation. Patient does not have tenderness over Mcburney's point, rebound tenderness or guarding. Patient does not appear clinically dehydrated and is able to tolerate po. Encouraged patient on oral hydration.  UA does not reveal evidence

## 2021-01-20 NOTE — ED INITIAL ASSESSMENT (HPI)
Had chicken soup she left out all night ate it following day and felt ill with nausea few hours afterwards so she vomited took asa/motrin for her headache. No further emesis. Just nauseated. Denies hx of htn.

## 2021-01-21 ENCOUNTER — PATIENT OUTREACH (OUTPATIENT)
Dept: FAMILY MEDICINE CLINIC | Facility: CLINIC | Age: 36
End: 2021-01-21

## 2021-01-21 NOTE — PROGRESS NOTES
Patient was seen in the urgent care for vomiting and diarrhea. Patient would not like to do a follow up at this time feeling better.

## 2021-02-01 ENCOUNTER — OFFICE VISIT (OUTPATIENT)
Dept: INTERNAL MEDICINE CLINIC | Facility: CLINIC | Age: 36
End: 2021-02-01
Payer: COMMERCIAL

## 2021-02-01 VITALS
HEART RATE: 97 BPM | SYSTOLIC BLOOD PRESSURE: 127 MMHG | HEIGHT: 62 IN | BODY MASS INDEX: 29.81 KG/M2 | DIASTOLIC BLOOD PRESSURE: 92 MMHG | WEIGHT: 162 LBS

## 2021-02-01 DIAGNOSIS — R03.0 ELEVATED BP WITHOUT DIAGNOSIS OF HYPERTENSION: Primary | ICD-10-CM

## 2021-02-01 DIAGNOSIS — E66.3 OVERWEIGHT (BMI 25.0-29.9): ICD-10-CM

## 2021-02-01 PROCEDURE — 3074F SYST BP LT 130 MM HG: CPT | Performed by: INTERNAL MEDICINE

## 2021-02-01 PROCEDURE — 3080F DIAST BP >= 90 MM HG: CPT | Performed by: INTERNAL MEDICINE

## 2021-02-01 PROCEDURE — 3008F BODY MASS INDEX DOCD: CPT | Performed by: INTERNAL MEDICINE

## 2021-02-01 PROCEDURE — 99213 OFFICE O/P EST LOW 20 MIN: CPT | Performed by: INTERNAL MEDICINE

## 2021-02-01 NOTE — PROGRESS NOTES
HPI:    Patient ID: Glennda Lundborg is a 28year old female. Patient presents with:  Blood Pressure: Was seen at [facility] on 1/20/21.   At the time of the visit her BP was elevated and was told to follow up with PCP    Patient in office t Wheat Gluten               PHYSICAL EXAM:   Physical Exam   Constitutional: She is oriented to person, place, and time. She appears well-developed and well-nourished. No distress. HENT:   Head: Normocephalic and atraumatic.    Right Ear: walking - as tolerated   Low sugar and carbohydrate diet  Cut down the portions  Walk exercise regularly patient education      Patient had a recent blood test as well has  No due for the Pap smear at this time recommend to schedule    Physical exam with STEVE

## 2021-04-23 ENCOUNTER — NURSE TRIAGE (OUTPATIENT)
Dept: INTERNAL MEDICINE CLINIC | Facility: CLINIC | Age: 36
End: 2021-04-23

## 2021-04-23 NOTE — TELEPHONE ENCOUNTER
Spoke with pt,  verified  Pt informed of MD recommendation, pt stated understanding. Pt stated she is currently at work and she is planning to go to [facility] for eval and treat. .         No future appointments.

## 2021-04-23 NOTE — TELEPHONE ENCOUNTER
Looks like this is chronic pain and lasts for few seconds  -if patient overly concerned can go to ER or immediate care for checkup today but advised can schedule appointment for follow-up visit      Any worsening pain should go to IC or  ER

## 2021-04-23 NOTE — TELEPHONE ENCOUNTER
Action Requested: Summary for Provider     []  Critical Lab, Recommendations Needed  [x] Need Additional Advice  []   FYI    []   Need Orders  [] Need Medications Sent to Pharmacy  []  Other     SUMMARY: Please advise if ok to wait for appt or patient to g

## 2021-04-29 NOTE — TELEPHONE ENCOUNTER
Spoke with pt and she states she is not sure which acne medication she would like a refill for but will check when she gets home and will let us know so we can refill this for her.

## 2021-04-29 NOTE — TELEPHONE ENCOUNTER
Patient needs a refill on an acne medication, but she doesn't remember the name of it.   Please advise

## 2021-04-30 NOTE — TELEPHONE ENCOUNTER
Patient returned call    states acne medication she needs a refill on is Epi-Duo    Also asking for refill on Minocycline. Asking to lower the dosage to maybe 100mg.  Please call

## 2021-05-03 NOTE — TELEPHONE ENCOUNTER
LOV 9/16/20, pt asking for refill on epiduo - pended. She will double check her other refill request at home and CB. Minocycline is not in her file, she does not think it's amoxicillin though. Awaiting clarification.

## 2021-05-04 RX ORDER — ADAPALENE AND BENZOYL PEROXIDE .1; 2.5 G/100G; G/100G
GEL TOPICAL
Qty: 45 G | Refills: 6 | Status: SHIPPED | OUTPATIENT
Start: 2021-05-04

## 2021-05-12 ENCOUNTER — OFFICE VISIT (OUTPATIENT)
Dept: INTERNAL MEDICINE CLINIC | Facility: CLINIC | Age: 36
End: 2021-05-12
Payer: COMMERCIAL

## 2021-05-12 VITALS
WEIGHT: 165 LBS | HEIGHT: 62 IN | BODY MASS INDEX: 30.36 KG/M2 | DIASTOLIC BLOOD PRESSURE: 80 MMHG | SYSTOLIC BLOOD PRESSURE: 130 MMHG | HEART RATE: 78 BPM | OXYGEN SATURATION: 99 %

## 2021-05-12 DIAGNOSIS — K21.9 GASTROESOPHAGEAL REFLUX DISEASE WITHOUT ESOPHAGITIS: Primary | ICD-10-CM

## 2021-05-12 DIAGNOSIS — Z00.00 PHYSICAL EXAM: ICD-10-CM

## 2021-05-12 DIAGNOSIS — M94.0 COSTOCHONDRITIS: ICD-10-CM

## 2021-05-12 PROCEDURE — 99214 OFFICE O/P EST MOD 30 MIN: CPT | Performed by: INTERNAL MEDICINE

## 2021-05-12 PROCEDURE — 3079F DIAST BP 80-89 MM HG: CPT | Performed by: INTERNAL MEDICINE

## 2021-05-12 PROCEDURE — 3008F BODY MASS INDEX DOCD: CPT | Performed by: INTERNAL MEDICINE

## 2021-05-12 PROCEDURE — 3075F SYST BP GE 130 - 139MM HG: CPT | Performed by: INTERNAL MEDICINE

## 2021-05-12 RX ORDER — OMEPRAZOLE 40 MG/1
40 CAPSULE, DELAYED RELEASE ORAL DAILY
Qty: 90 CAPSULE | Refills: 1 | Status: SHIPPED | OUTPATIENT
Start: 2021-05-12 | End: 2021-06-11

## 2021-05-12 NOTE — PROGRESS NOTES
HPI/Subjective:   Patient ID: Lena Morejon is a 39year old female. Patient presents with:  Chest Pain: F/u on middle area chest pain. Pain is noticed when she takes a deep breath or when she lifts heavy bags - last  week pain is  resolved now  . pain last   Week  Not  This  Week    Gastrointestinal: Negative for abdominal pain, constipation, diarrhea, nausea and vomiting. Genitourinary: Negative for dysuria and frequency. Skin: Negative for pallor.    Neurological: Negative for dizziness, numbn education    pt   Had  EKg and  Cxr  Normal -11/20   Orders Placed This Encounter      CBC W Differential W Platelet [E]      Comp Metabolic Panel (14) [E]      Lipid Panel [E]      TSH W Reflex To Free T4 [E]      UA/M WITH CULTURE REFLEX [3020]      Meds

## 2021-06-01 NOTE — TELEPHONE ENCOUNTER
Which med?   TAC is the only med that would come in an ointment    Please triage further,  This was sent as rx request, but please veryfy for what/ where she is using this?    rx pended

## 2021-06-01 NOTE — TELEPHONE ENCOUNTER
Patient would like her medication to be change to ointment instead of cream.Patient stated feels the cream is not working. Patient is working if nurse calls could leave message on her phone. Please advise.

## 2021-06-11 ENCOUNTER — TELEPHONE (OUTPATIENT)
Dept: INTERNAL MEDICINE CLINIC | Facility: CLINIC | Age: 36
End: 2021-06-11

## 2021-06-11 DIAGNOSIS — N92.6 MENSTRUAL CYCLE PROBLEM: Primary | ICD-10-CM

## 2021-06-11 NOTE — TELEPHONE ENCOUNTER
Patient is calling and advising she is having issues with her menstrual cycles. She is asking for a referral for an OB/GYN that is not Dr. Jerod Gaxiola. If we need to call patient and she does not answer we can leave a voicemail      Please Advise.

## 2021-06-16 NOTE — TELEPHONE ENCOUNTER
Spoke with pt woh states she no longer needs the referral.  Pt was able to find an OB/GYN in network at [facility] and does not need written referral.

## 2021-06-16 NOTE — TELEPHONE ENCOUNTER
Pls advise on message below. Pended referral for approval.  Do you have any specific OB/GYN she can see?   She does not want Dr. Arnol Mccray

## 2021-08-16 ENCOUNTER — OFFICE VISIT (OUTPATIENT)
Dept: INTERNAL MEDICINE CLINIC | Facility: CLINIC | Age: 36
End: 2021-08-16
Payer: COMMERCIAL

## 2021-08-16 VITALS
SYSTOLIC BLOOD PRESSURE: 130 MMHG | HEART RATE: 87 BPM | BODY MASS INDEX: 29.66 KG/M2 | OXYGEN SATURATION: 98 % | WEIGHT: 167.38 LBS | HEIGHT: 63 IN | DIASTOLIC BLOOD PRESSURE: 97 MMHG

## 2021-08-16 DIAGNOSIS — H60.592 OTHER NONINFECTIVE ACUTE OTITIS EXTERNA OF LEFT EAR: Primary | ICD-10-CM

## 2021-08-16 DIAGNOSIS — R03.0 ELEVATED BLOOD PRESSURE READING: ICD-10-CM

## 2021-08-16 PROCEDURE — 3080F DIAST BP >= 90 MM HG: CPT | Performed by: NURSE PRACTITIONER

## 2021-08-16 PROCEDURE — 3008F BODY MASS INDEX DOCD: CPT | Performed by: NURSE PRACTITIONER

## 2021-08-16 PROCEDURE — 3075F SYST BP GE 130 - 139MM HG: CPT | Performed by: NURSE PRACTITIONER

## 2021-08-16 PROCEDURE — 99214 OFFICE O/P EST MOD 30 MIN: CPT | Performed by: NURSE PRACTITIONER

## 2021-08-16 RX ORDER — OFLOXACIN 3 MG/ML
10 SOLUTION AURICULAR (OTIC) DAILY
Qty: 5 ML | Refills: 0 | Status: SHIPPED | OUTPATIENT
Start: 2021-08-16 | End: 2021-08-23

## 2021-08-16 NOTE — PROGRESS NOTES
HPI:    Patient ID: Pratik Crockett is a 39year old female. Exercise level: not exercising and HAS NOT been following low salt diet. Weight has been stable.     Wt Readings from Last 3 Encounters:  08/16/21 : 167 lb 6.4 oz (75.9 kg)  08/10/21 : 16 discharge, facial swelling, hearing loss, mouth sores, nosebleeds, postnasal drip, rhinorrhea, sinus pressure, sinus pain, sneezing, sore throat, tinnitus, trouble swallowing and voice change. Eyes: Negative. Respiratory: Negative.   Negative for coug Surgical History:   Procedure Laterality Date   • BREAST BIOPSY Left 2014   •      • NEEDLE BIOPSY LEFT  2014      Family History   Problem Relation Age of Onset   • Infectious Disease Father         tuberculosis      Social History:    So side and 2+ on the left side. Dorsalis pedis pulses are 2+ on the right side and 2+ on the left side. Posterior tibial pulses are 2+ on the right side and 2+ on the left side.       Heart sounds: Normal heart sounds, S1 normal and S2 normal.

## 2021-08-16 NOTE — PATIENT INSTRUCTIONS
ASSESSMENT/PLAN:   Other noninfective acute otitis externa of left ear  (primary encounter diagnosis)  Use eardrops 10 drops in affected ear daily for 7 days.     Elevated blood pressure reading  Careful with diet and excercise at least 30 minutes 3-4 evan

## 2021-09-07 ENCOUNTER — TELEPHONE (OUTPATIENT)
Dept: SCHEDULING | Age: 36
End: 2021-09-07

## 2021-09-07 ENCOUNTER — TELEPHONE (OUTPATIENT)
Dept: INTERNAL MEDICINE CLINIC | Facility: CLINIC | Age: 36
End: 2021-09-07

## 2021-09-07 ENCOUNTER — APPOINTMENT (OUTPATIENT)
Dept: URGENT CARE | Age: 36
End: 2021-09-07

## 2021-09-07 NOTE — TELEPHONE ENCOUNTER
Patient calling reports having sore throat, coughing, lost sense of taste and smell, body aches, chills, tired . , onset of 4 days       Offered video visit , declines at this time   Provided locations/ hours for [facility]     Patient verbalizes Merari Lock

## 2021-09-08 ENCOUNTER — WALK IN (OUTPATIENT)
Dept: URGENT CARE | Age: 36
End: 2021-09-08

## 2021-09-08 ENCOUNTER — APPOINTMENT (OUTPATIENT)
Dept: URGENT CARE | Age: 36
End: 2021-09-08

## 2021-09-08 ENCOUNTER — TELEPHONE (OUTPATIENT)
Dept: SCHEDULING | Age: 36
End: 2021-09-08

## 2021-09-08 VITALS
TEMPERATURE: 97.3 F | BODY MASS INDEX: 31.53 KG/M2 | RESPIRATION RATE: 16 BRPM | HEART RATE: 96 BPM | OXYGEN SATURATION: 99 % | WEIGHT: 167 LBS | SYSTOLIC BLOOD PRESSURE: 138 MMHG | DIASTOLIC BLOOD PRESSURE: 88 MMHG | HEIGHT: 61 IN

## 2021-09-08 DIAGNOSIS — J02.9 SORE THROAT: ICD-10-CM

## 2021-09-08 DIAGNOSIS — R03.0 ELEVATED BLOOD-PRESSURE READING WITHOUT DIAGNOSIS OF HYPERTENSION: ICD-10-CM

## 2021-09-08 DIAGNOSIS — J02.9 ACUTE VIRAL PHARYNGITIS: Primary | ICD-10-CM

## 2021-09-08 DIAGNOSIS — Z20.822 SUSPECTED COVID-19 VIRUS INFECTION: ICD-10-CM

## 2021-09-08 LAB
INTERNAL PROCEDURAL CONTROLS ACCEPTABLE: YES
S PYO AG THROAT QL IA.RAPID: NEGATIVE

## 2021-09-08 PROCEDURE — 87880 STREP A ASSAY W/OPTIC: CPT | Performed by: NURSE PRACTITIONER

## 2021-09-08 PROCEDURE — 99203 OFFICE O/P NEW LOW 30 MIN: CPT | Performed by: NURSE PRACTITIONER

## 2021-09-08 ASSESSMENT — ENCOUNTER SYMPTOMS
CHEST TIGHTNESS: 0
VOMITING: 0
SHORTNESS OF BREATH: 0
GASTROINTESTINAL NEGATIVE: 1
ALLERGIC/IMMUNOLOGIC NEGATIVE: 1
CHILLS: 0
PSYCHIATRIC NEGATIVE: 1
HEMATOLOGIC/LYMPHATIC NEGATIVE: 1
DIAPHORESIS: 0
FATIGUE: 0
FEVER: 0
SINUS PAIN: 1
EYES NEGATIVE: 1
ANAL BLEEDING: 0
RHINORRHEA: 1
CHOKING: 0
APNEA: 0
ABDOMINAL PAIN: 0
RECTAL PAIN: 0
DIARRHEA: 0
CONSTITUTIONAL NEGATIVE: 1
BLOOD IN STOOL: 0
SORE THROAT: 1
NAUSEA: 0
WHEEZING: 0
STRIDOR: 0
ABDOMINAL DISTENTION: 0
CONSTIPATION: 0
ENDOCRINE NEGATIVE: 1
SINUS PRESSURE: 1
COUGH: 1
HEADACHES: 1

## 2021-09-08 ASSESSMENT — PAIN SCALES - GENERAL: PAINLEVEL: 4

## 2021-09-09 ENCOUNTER — NURSE TRIAGE (OUTPATIENT)
Dept: INTERNAL MEDICINE CLINIC | Facility: CLINIC | Age: 36
End: 2021-09-09

## 2021-09-09 ENCOUNTER — OFFICE VISIT (OUTPATIENT)
Dept: INTERNAL MEDICINE CLINIC | Facility: CLINIC | Age: 36
End: 2021-09-09
Payer: COMMERCIAL

## 2021-09-09 VITALS
BODY MASS INDEX: 28.35 KG/M2 | OXYGEN SATURATION: 99 % | RESPIRATION RATE: 18 BRPM | WEIGHT: 160 LBS | TEMPERATURE: 98 F | DIASTOLIC BLOOD PRESSURE: 94 MMHG | HEART RATE: 87 BPM | SYSTOLIC BLOOD PRESSURE: 145 MMHG | HEIGHT: 63 IN

## 2021-09-09 DIAGNOSIS — I10 PRIMARY HYPERTENSION: Primary | ICD-10-CM

## 2021-09-09 PROCEDURE — 3080F DIAST BP >= 90 MM HG: CPT | Performed by: INTERNAL MEDICINE

## 2021-09-09 PROCEDURE — 3008F BODY MASS INDEX DOCD: CPT | Performed by: INTERNAL MEDICINE

## 2021-09-09 PROCEDURE — 99214 OFFICE O/P EST MOD 30 MIN: CPT | Performed by: INTERNAL MEDICINE

## 2021-09-09 PROCEDURE — 3077F SYST BP >= 140 MM HG: CPT | Performed by: INTERNAL MEDICINE

## 2021-09-09 RX ORDER — HYDROCHLOROTHIAZIDE 12.5 MG/1
12.5 TABLET ORAL DAILY
Qty: 30 TABLET | Refills: 2 | Status: SHIPPED | OUTPATIENT
Start: 2021-09-09 | End: 2021-09-29

## 2021-09-09 NOTE — TELEPHONE ENCOUNTER
Action Requested: Summary for Provider     []  Critical Lab, Recommendations Needed  [] Need Additional Advice  []   FYI    []   Need Orders  [] Need Medications Sent to Pharmacy  []  Other     SUMMARY: Scheduled patient with Dr. Jennifer Ramos today at 11:30 AM.

## 2021-09-09 NOTE — PROGRESS NOTES
HPI/Subjective:     Patient ID: Hawa Church is a 39year old female. HPI  Patient comes in today with concern that her blood pressure lately has been elevated she has been having some headache with it.   She checks it at home and has been in 140s Comment:Other reaction(s): Rash  Pollen                    Wheat Gluten                Past Medical History:   Diagnosis Date   • Acne    • Bipolar II disorder ([facility] [address]    • Breast mass, left 9/8/2014   • Constipation 10/29/2015   • Depression    • Mi time.      Deep Tendon Reflexes: Reflexes are normal and symmetric. Psychiatric:         Behavior: Behavior normal.         Thought Content:  Thought content normal.         Judgment: Judgment normal.         Assessment & Plan:   Primary hypertension  (pr

## 2021-09-13 ENCOUNTER — TELEPHONE (OUTPATIENT)
Dept: INTERNAL MEDICINE CLINIC | Facility: CLINIC | Age: 36
End: 2021-09-13

## 2021-09-13 NOTE — TELEPHONE ENCOUNTER
LOV 9-9-2021    Was prescribed hydrochlorothiazide 12.5mg for elevated blood pressure. Feels lightheaded after she takes this medication. Tries to lay down after she takes it.   Most recent BP readings on medication:  /100   /109    Says her

## 2021-09-13 NOTE — TELEPHONE ENCOUNTER
Informed patient of advice per Dr. Dev Smith.     Scheduled patient to see Dr. Dev Smith on this Wednesday at 9:40 am.

## 2021-09-13 NOTE — TELEPHONE ENCOUNTER
If she does not feel well with medication should hold it or even take every other day     follow-up shortly in office-this week   recommend patient to take the blood test before visit time  Last blood test long time ago    Patient to check her blood pressu

## 2021-09-15 ENCOUNTER — OFFICE VISIT (OUTPATIENT)
Dept: INTERNAL MEDICINE CLINIC | Facility: CLINIC | Age: 36
End: 2021-09-15
Payer: COMMERCIAL

## 2021-09-15 VITALS
HEIGHT: 63 IN | DIASTOLIC BLOOD PRESSURE: 103 MMHG | SYSTOLIC BLOOD PRESSURE: 151 MMHG | WEIGHT: 165 LBS | BODY MASS INDEX: 29.23 KG/M2 | OXYGEN SATURATION: 99 % | HEART RATE: 90 BPM

## 2021-09-15 DIAGNOSIS — N92.1 MENORRHAGIA WITH IRREGULAR CYCLE: ICD-10-CM

## 2021-09-15 DIAGNOSIS — I10 ESSENTIAL HYPERTENSION WITH GOAL BLOOD PRESSURE LESS THAN 130/80: Primary | ICD-10-CM

## 2021-09-15 PROCEDURE — 3008F BODY MASS INDEX DOCD: CPT | Performed by: INTERNAL MEDICINE

## 2021-09-15 PROCEDURE — 99213 OFFICE O/P EST LOW 20 MIN: CPT | Performed by: INTERNAL MEDICINE

## 2021-09-15 PROCEDURE — 3077F SYST BP >= 140 MM HG: CPT | Performed by: INTERNAL MEDICINE

## 2021-09-15 PROCEDURE — 3080F DIAST BP >= 90 MM HG: CPT | Performed by: INTERNAL MEDICINE

## 2021-09-15 RX ORDER — AMLODIPINE BESYLATE 5 MG/1
5 TABLET ORAL DAILY
Qty: 30 TABLET | Refills: 0 | Status: SHIPPED | OUTPATIENT
Start: 2021-09-15 | End: 2021-10-06

## 2021-09-15 NOTE — PROGRESS NOTES
Subjective:   Patient ID: Maddie Ching is a 39year old female. Patient presents with:  Hypertension: Denies any chest pain or shortness of breath. Stts she feels a little lightheaded.   Patient in office today for HTN was taking  HCTZ 12.5 mg  For (Patient not taking: Reported on 9/15/2021) 30 tablet 2   • ibuprofen 800 MG Oral Tab Take 1 tablet (800 mg total) by mouth every 6 (six) hours as needed for Pain.  Take with food (Patient not taking: No sig reported) 40 tablet 2   • triamcinolone acetonide soft. There is no mass. Tenderness: There is no abdominal tenderness. There is no right CVA tenderness or left CVA tenderness. Musculoskeletal:      Cervical back: Neck supple. Right lower leg: No edema. Left lower leg: No edema.    Lymphad

## 2021-09-18 ENCOUNTER — LAB ENCOUNTER (OUTPATIENT)
Dept: LAB | Facility: HOSPITAL | Age: 36
End: 2021-09-18
Attending: INTERNAL MEDICINE
Payer: COMMERCIAL

## 2021-09-18 DIAGNOSIS — Z00.00 PHYSICAL EXAM: ICD-10-CM

## 2021-09-18 LAB
ALBUMIN SERPL-MCNC: 3.6 G/DL (ref 3.4–5)
ALBUMIN/GLOB SERPL: 1 {RATIO} (ref 1–2)
ALP LIVER SERPL-CCNC: 45 U/L
ALT SERPL-CCNC: 28 U/L
ANION GAP SERPL CALC-SCNC: 7 MMOL/L (ref 0–18)
AST SERPL-CCNC: 19 U/L (ref 15–37)
BASOPHILS # BLD AUTO: 0.04 X10(3) UL (ref 0–0.2)
BASOPHILS NFR BLD AUTO: 0.6 %
BILIRUB SERPL-MCNC: 0.4 MG/DL (ref 0.1–2)
BILIRUB UR QL: NEGATIVE
BUN BLD-MCNC: 8 MG/DL (ref 7–18)
BUN/CREAT SERPL: 11.8 (ref 10–20)
CALCIUM BLD-MCNC: 8.3 MG/DL (ref 8.5–10.1)
CHLORIDE SERPL-SCNC: 108 MMOL/L (ref 98–112)
CHOLEST SERPL-MCNC: 145 MG/DL (ref ?–200)
CO2 SERPL-SCNC: 25 MMOL/L (ref 21–32)
COLOR UR: YELLOW
CREAT BLD-MCNC: 0.68 MG/DL
DEPRECATED RDW RBC AUTO: 39.1 FL (ref 35.1–46.3)
EOSINOPHIL # BLD AUTO: 0.12 X10(3) UL (ref 0–0.7)
EOSINOPHIL NFR BLD AUTO: 1.9 %
ERYTHROCYTE [DISTWIDTH] IN BLOOD BY AUTOMATED COUNT: 11.7 % (ref 11–15)
GLOBULIN PLAS-MCNC: 3.7 G/DL (ref 2.8–4.4)
GLUCOSE BLD-MCNC: 80 MG/DL (ref 70–99)
GLUCOSE UR-MCNC: NEGATIVE MG/DL
HCT VFR BLD AUTO: 40.9 %
HDLC SERPL-MCNC: 55 MG/DL (ref 40–59)
HGB BLD-MCNC: 13.6 G/DL
HGB UR QL STRIP.AUTO: NEGATIVE
IMM GRANULOCYTES # BLD AUTO: 0.02 X10(3) UL (ref 0–1)
IMM GRANULOCYTES NFR BLD: 0.3 %
LDLC SERPL CALC-MCNC: 74 MG/DL (ref ?–100)
LEUKOCYTE ESTERASE UR QL STRIP.AUTO: NEGATIVE
LYMPHOCYTES # BLD AUTO: 2.08 X10(3) UL (ref 1–4)
LYMPHOCYTES NFR BLD AUTO: 33.3 %
MCH RBC QN AUTO: 30.5 PG (ref 26–34)
MCHC RBC AUTO-ENTMCNC: 33.3 G/DL (ref 31–37)
MCV RBC AUTO: 91.7 FL
MONOCYTES # BLD AUTO: 0.34 X10(3) UL (ref 0.1–1)
MONOCYTES NFR BLD AUTO: 5.4 %
NEUTROPHILS # BLD AUTO: 3.65 X10 (3) UL (ref 1.5–7.7)
NEUTROPHILS # BLD AUTO: 3.65 X10(3) UL (ref 1.5–7.7)
NEUTROPHILS NFR BLD AUTO: 58.5 %
NITRITE UR QL STRIP.AUTO: NEGATIVE
NONHDLC SERPL-MCNC: 90 MG/DL (ref ?–130)
OSMOLALITY SERPL CALC.SUM OF ELEC: 287 MOSM/KG (ref 275–295)
PATIENT FASTING Y/N/NP: YES
PATIENT FASTING Y/N/NP: YES
PH UR: 7 [PH] (ref 5–8)
PLATELET # BLD AUTO: 331 10(3)UL (ref 150–450)
POTASSIUM SERPL-SCNC: 3.7 MMOL/L (ref 3.5–5.1)
PROT SERPL-MCNC: 7.3 G/DL (ref 6.4–8.2)
PROT UR-MCNC: 100 MG/DL
RBC # BLD AUTO: 4.46 X10(6)UL
SODIUM SERPL-SCNC: 140 MMOL/L (ref 136–145)
SP GR UR STRIP: 1.03 (ref 1–1.03)
TRIGL SERPL-MCNC: 82 MG/DL (ref 30–149)
TSI SER-ACNC: 1.51 MIU/ML (ref 0.36–3.74)
UROBILINOGEN UR STRIP-ACNC: <2
VLDLC SERPL CALC-MCNC: 13 MG/DL (ref 0–30)
WBC # BLD AUTO: 6.3 X10(3) UL (ref 4–11)

## 2021-09-18 PROCEDURE — 84443 ASSAY THYROID STIM HORMONE: CPT

## 2021-09-18 PROCEDURE — 85025 COMPLETE CBC W/AUTO DIFF WBC: CPT

## 2021-09-18 PROCEDURE — 80061 LIPID PANEL: CPT

## 2021-09-18 PROCEDURE — 36415 COLL VENOUS BLD VENIPUNCTURE: CPT

## 2021-09-18 PROCEDURE — 81001 URINALYSIS AUTO W/SCOPE: CPT | Performed by: INTERNAL MEDICINE

## 2021-09-18 PROCEDURE — 80053 COMPREHEN METABOLIC PANEL: CPT

## 2021-09-28 NOTE — PROGRESS NOTES
Natividad Cuellar is a 29year old female [id number] Patient's last menstrual period was 06/28/2019. Patient presents with:  Gyn Problem: infertility, discuss menorrhagia    Last seen 1/29/18. Stopped ocp ? 8/2017. Trying to get pregnant with new . Peroxide (EPIDUO FORTE) 0.3-2.5 % External Gel Apply 1 Application topically daily. Disp: 45 g Rfl: 6   triamcinolone acetonide 0.1 % External Cream Apply topically 2 (two) times daily.  Disp: 1 Tube Rfl: 0   hydrocortisone 2.5 % External Cream Apply sparin no

## 2021-09-29 ENCOUNTER — OFFICE VISIT (OUTPATIENT)
Dept: INTERNAL MEDICINE CLINIC | Facility: CLINIC | Age: 36
End: 2021-09-29
Payer: COMMERCIAL

## 2021-09-29 VITALS
SYSTOLIC BLOOD PRESSURE: 115 MMHG | DIASTOLIC BLOOD PRESSURE: 75 MMHG | OXYGEN SATURATION: 99 % | HEIGHT: 63 IN | BODY MASS INDEX: 29.23 KG/M2 | WEIGHT: 165 LBS | HEART RATE: 79 BPM

## 2021-09-29 DIAGNOSIS — K21.9 GASTROESOPHAGEAL REFLUX DISEASE WITHOUT ESOPHAGITIS: ICD-10-CM

## 2021-09-29 DIAGNOSIS — N92.1 MENORRHAGIA WITH IRREGULAR CYCLE: ICD-10-CM

## 2021-09-29 DIAGNOSIS — I10 ESSENTIAL HYPERTENSION WITH GOAL BLOOD PRESSURE LESS THAN 130/80: Primary | ICD-10-CM

## 2021-09-29 PROCEDURE — 99214 OFFICE O/P EST MOD 30 MIN: CPT | Performed by: INTERNAL MEDICINE

## 2021-09-29 PROCEDURE — 3008F BODY MASS INDEX DOCD: CPT | Performed by: INTERNAL MEDICINE

## 2021-09-29 PROCEDURE — 3078F DIAST BP <80 MM HG: CPT | Performed by: INTERNAL MEDICINE

## 2021-09-29 PROCEDURE — 3074F SYST BP LT 130 MM HG: CPT | Performed by: INTERNAL MEDICINE

## 2021-09-29 RX ORDER — FAMOTIDINE 20 MG/1
20 TABLET ORAL
Qty: 60 TABLET | Refills: 1 | Status: SHIPPED | OUTPATIENT
Start: 2021-09-29 | End: 2021-10-21

## 2021-09-29 NOTE — PROGRESS NOTES
Subjective:   Patient ID: Pratik Crockett is a 39year old female. Patient presents with:  Hypertension: Stts she checks her BP at home twice a day.   BP was 130/100 last night, and this morning BP was 120/98  Patient in office today for HTN   Feels muc Pain. (Patient not taking: Reported on 9/29/2021) 20 tablet 0   • Adapalene-Benzoyl Peroxide (EPIDUO) 0.1-2.5 % External Gel Use bid to affected areas of face, chest, back as directed (Patient not taking: No sig reported) 45 g 6     Allergies:  Minocycline weight 165 lb (74.8 kg), last menstrual period 07/22/2021, SpO2 99 %, not currently breastfeeding.       Assessment & Plan:   Essential hypertension with goal blood pressure less than 130/80  (primary encounter diagnosis)  Take high blood pressure medicatio

## 2021-10-06 RX ORDER — AMLODIPINE BESYLATE 5 MG/1
TABLET ORAL
Qty: 30 TABLET | Refills: 0 | Status: SHIPPED | OUTPATIENT
Start: 2021-10-06 | End: 2021-10-31

## 2021-10-21 RX ORDER — FAMOTIDINE 20 MG/1
20 TABLET, FILM COATED ORAL
Qty: 180 TABLET | Refills: 1 | Status: SHIPPED | OUTPATIENT
Start: 2021-10-21 | End: 2022-06-13

## 2021-10-21 NOTE — TELEPHONE ENCOUNTER
[facility]. Requested Prescriptions   Pending Prescriptions Disp Refills    FAMOTIDINE 20 MG Oral Tab [Pharmacy Med Name: FAMOTIDINE 20 MG TABLET] 60 tablet 1     Sig: Take 1 tablet (20 mg total) by mouth 2 (two) times daily before meals.  30 minutes        Gastrointestional Medication Protocol Passed - 10/21/2021 12:34 PM        Passed - Appointment in past 12 or next 3 months                Recent Outpatient Visits              3 weeks ago Essential hypertension with goal blood pressure less than 130/80    [facility], [address], [place] Armen Shines, MD    Office Visit    1 month ago Essential hypertension with goal blood pressure less than 130/80    [facility], [address], [place] Armen Shines, MD    Office Visit    1 month ago Primary hypertension    [facility], [address], [facility] [place] Fawad Aguirre MD    Office Visit    2 months ago Other noninfective acute otitis externa of left ear    [facility], [address], Ana M Bone, APRN    Office Visit    2 months ago DUB (dysfunctional uterine bleeding)    OB[facility]N - [facility] Basques, Drucie Jeans., MD    Office Visit

## 2021-10-31 RX ORDER — AMLODIPINE BESYLATE 5 MG/1
5 TABLET ORAL DAILY
Qty: 90 TABLET | Refills: 1 | Status: SHIPPED | OUTPATIENT
Start: 2021-10-31 | End: 2023-02-13

## 2021-10-31 NOTE — TELEPHONE ENCOUNTER
Refill passed per [facility] protocol.     Requested Prescriptions   Pending Prescriptions Disp Refills    AMLODIPINE 5 MG Oral Tab [Pharmacy Med Name: AMLODIPINE BESYLATE 5 MG TAB] 30 tablet 0     Sig: TAKE 1 TABLET BY MOUTH EVERY DAY        Hypertensive Medications Protocol Passed - 10/31/2021 12:31 PM        Passed - CMP or BMP in past 12 months        Passed - Appointment in past 6 or next 3 months        Passed - GFR Non- > 50     Lab Results   Component Value Date    GFRNAA 113 09/18/2021                       Recent Outpatient Visits              1 month ago Essential hypertension with goal blood pressure less than 130/80    [facility], [address], [place] Aron Pellet, MD    Office Visit    1 month ago Essential hypertension with goal blood pressure less than 130/80    [facility], [address] [place], [place] Aron Pellet, MD    Office Visit    1 month ago Primary hypertension    [facility], [address], [place], Carlos Florez MD    Office Visit    2 months ago Other noninfective acute otitis externa of left ear    [facility], [address], Samina Bone, APRN    Office Visit    2 months ago DUB (dysfunctional uterine bleeding)    [facility] - [facility], [address] Faustina Baker MD    Office Visit

## 2022-01-13 ENCOUNTER — OFFICE VISIT (OUTPATIENT)
Dept: INTERNAL MEDICINE CLINIC | Facility: CLINIC | Age: 37
End: 2022-01-13
Payer: MEDICAID

## 2022-01-13 VITALS
HEIGHT: 63 IN | WEIGHT: 147 LBS | SYSTOLIC BLOOD PRESSURE: 126 MMHG | HEART RATE: 75 BPM | DIASTOLIC BLOOD PRESSURE: 89 MMHG | BODY MASS INDEX: 26.05 KG/M2

## 2022-01-13 DIAGNOSIS — K21.9 GASTROESOPHAGEAL REFLUX DISEASE WITHOUT ESOPHAGITIS: ICD-10-CM

## 2022-01-13 DIAGNOSIS — I10 ESSENTIAL HYPERTENSION WITH GOAL BLOOD PRESSURE LESS THAN 130/80: Primary | ICD-10-CM

## 2022-01-13 DIAGNOSIS — N92.1 MENORRHAGIA WITH IRREGULAR CYCLE: ICD-10-CM

## 2022-01-13 PROCEDURE — 3079F DIAST BP 80-89 MM HG: CPT | Performed by: INTERNAL MEDICINE

## 2022-01-13 PROCEDURE — 3008F BODY MASS INDEX DOCD: CPT | Performed by: INTERNAL MEDICINE

## 2022-01-13 PROCEDURE — 99214 OFFICE O/P EST MOD 30 MIN: CPT | Performed by: INTERNAL MEDICINE

## 2022-01-13 PROCEDURE — 3074F SYST BP LT 130 MM HG: CPT | Performed by: INTERNAL MEDICINE

## 2022-01-13 NOTE — PROGRESS NOTES
Subjective:   Patient ID: Cleo Jimenez is a 39year old female. Patient presents with:  Hypertension  Patient in office today for HTN   Still has irregular  menstrual and painful and heavy  period at times  ,tramadol makes her very tired .   Patient 0.35 MG Oral Tab Take 1 tablet (0.35 mg total) by mouth daily.  84 tablet 4   • Adapalene-Benzoyl Peroxide (EPIDUO) 0.1-2.5 % External Gel Use bid to affected areas of face, chest, back as directed 45 g 6     Allergies:  Minocycline                 Comment: last menstrual period 01/11/2022, not currently breastfeeding.       Assessment & Plan:   Essential hypertension with goal blood pressure less than 130/80  (primary encounter diagnosis)  Take high blood pressure medication as perscribed   Low- sodium diet

## 2022-01-21 ENCOUNTER — TELEPHONE (OUTPATIENT)
Dept: INTERNAL MEDICINE CLINIC | Facility: CLINIC | Age: 37
End: 2022-01-21

## 2022-01-21 NOTE — TELEPHONE ENCOUNTER
Patient calling requesting a call back from nurse regarding questions about her lab. Patient states that she called someone who told her fasting was not necessary but on her paper it says that she should be fasting.  Patient is requesting clarification on w

## 2022-01-21 NOTE — TELEPHONE ENCOUNTER
Advised via mychart prior results are good with or without fasting, advised new orders should be done while fasting.

## 2022-01-22 ENCOUNTER — LAB ENCOUNTER (OUTPATIENT)
Dept: LAB | Facility: HOSPITAL | Age: 37
End: 2022-01-22
Attending: INTERNAL MEDICINE
Payer: MEDICAID

## 2022-01-22 DIAGNOSIS — N92.1 MENORRHAGIA WITH IRREGULAR CYCLE: ICD-10-CM

## 2022-01-22 DIAGNOSIS — I10 ESSENTIAL HYPERTENSION WITH GOAL BLOOD PRESSURE LESS THAN 130/80: ICD-10-CM

## 2022-01-22 LAB
ALBUMIN SERPL-MCNC: 3.8 G/DL (ref 3.4–5)
ALBUMIN/GLOB SERPL: 1.2 {RATIO} (ref 1–2)
ALP LIVER SERPL-CCNC: 49 U/L
ALT SERPL-CCNC: 18 U/L
ANION GAP SERPL CALC-SCNC: 5 MMOL/L (ref 0–18)
AST SERPL-CCNC: 11 U/L (ref 15–37)
BASOPHILS # BLD AUTO: 0.05 X10(3) UL (ref 0–0.2)
BASOPHILS NFR BLD AUTO: 0.7 %
BILIRUB SERPL-MCNC: 0.7 MG/DL (ref 0.1–2)
BUN BLD-MCNC: 12 MG/DL (ref 7–18)
BUN/CREAT SERPL: 16.9 (ref 10–20)
CALCIUM BLD-MCNC: 8.9 MG/DL (ref 8.5–10.1)
CHLORIDE SERPL-SCNC: 106 MMOL/L (ref 98–112)
CO2 SERPL-SCNC: 30 MMOL/L (ref 21–32)
CREAT BLD-MCNC: 0.71 MG/DL
DEPRECATED RDW RBC AUTO: 40.1 FL (ref 35.1–46.3)
EOSINOPHIL # BLD AUTO: 0.08 X10(3) UL (ref 0–0.7)
EOSINOPHIL NFR BLD AUTO: 1.2 %
ERYTHROCYTE [DISTWIDTH] IN BLOOD BY AUTOMATED COUNT: 11.9 % (ref 11–15)
FASTING STATUS PATIENT QL REPORTED: YES
GLOBULIN PLAS-MCNC: 3.3 G/DL (ref 2.8–4.4)
GLUCOSE BLD-MCNC: 81 MG/DL (ref 70–99)
HCT VFR BLD AUTO: 43.9 %
HGB BLD-MCNC: 14.5 G/DL
IMM GRANULOCYTES # BLD AUTO: 0.02 X10(3) UL (ref 0–1)
IMM GRANULOCYTES NFR BLD: 0.3 %
LYMPHOCYTES # BLD AUTO: 2.32 X10(3) UL (ref 1–4)
LYMPHOCYTES NFR BLD AUTO: 34.8 %
MCH RBC QN AUTO: 30.7 PG (ref 26–34)
MCHC RBC AUTO-ENTMCNC: 33 G/DL (ref 31–37)
MCV RBC AUTO: 92.8 FL
MONOCYTES # BLD AUTO: 0.4 X10(3) UL (ref 0.1–1)
MONOCYTES NFR BLD AUTO: 6 %
NEUTROPHILS # BLD AUTO: 3.8 X10 (3) UL (ref 1.5–7.7)
NEUTROPHILS # BLD AUTO: 3.8 X10(3) UL (ref 1.5–7.7)
NEUTROPHILS NFR BLD AUTO: 57 %
OSMOLALITY SERPL CALC.SUM OF ELEC: 291 MOSM/KG (ref 275–295)
PLATELET # BLD AUTO: 395 10(3)UL (ref 150–450)
POTASSIUM SERPL-SCNC: 4.3 MMOL/L (ref 3.5–5.1)
PROT SERPL-MCNC: 7.1 G/DL (ref 6.4–8.2)
RBC # BLD AUTO: 4.73 X10(6)UL
SODIUM SERPL-SCNC: 141 MMOL/L (ref 136–145)
WBC # BLD AUTO: 6.7 X10(3) UL (ref 4–11)

## 2022-01-22 PROCEDURE — 36415 COLL VENOUS BLD VENIPUNCTURE: CPT

## 2022-01-22 PROCEDURE — 80053 COMPREHEN METABOLIC PANEL: CPT

## 2022-01-22 PROCEDURE — 85025 COMPLETE CBC W/AUTO DIFF WBC: CPT

## 2022-03-16 ENCOUNTER — OFFICE VISIT (OUTPATIENT)
Dept: INTERNAL MEDICINE CLINIC | Facility: CLINIC | Age: 37
End: 2022-03-16
Payer: MEDICAID

## 2022-03-16 VITALS
BODY MASS INDEX: 25.73 KG/M2 | SYSTOLIC BLOOD PRESSURE: 120 MMHG | WEIGHT: 145.19 LBS | DIASTOLIC BLOOD PRESSURE: 78 MMHG | HEART RATE: 86 BPM | HEIGHT: 63 IN | OXYGEN SATURATION: 99 %

## 2022-03-16 DIAGNOSIS — B00.1 HERPES LABIALIS: ICD-10-CM

## 2022-03-16 DIAGNOSIS — N89.8 VAGINAL DISCHARGE: Primary | ICD-10-CM

## 2022-03-16 DIAGNOSIS — R30.0 DYSURIA: ICD-10-CM

## 2022-03-16 DIAGNOSIS — I10 ESSENTIAL HYPERTENSION WITH GOAL BLOOD PRESSURE LESS THAN 130/80: ICD-10-CM

## 2022-03-16 LAB
APPEARANCE: CLEAR
BILIRUB UR QL: NEGATIVE
BILIRUBIN: NEGATIVE
COLOR UR: YELLOW
GLUCOSE (URINE DIPSTICK): NEGATIVE MG/DL
GLUCOSE UR-MCNC: NEGATIVE MG/DL
HGB UR QL STRIP.AUTO: NEGATIVE
KETONES (URINE DIPSTICK): NEGATIVE MG/DL
KETONES UR-MCNC: NEGATIVE MG/DL
LEUKOCYTE ESTERASE UR QL STRIP.AUTO: NEGATIVE
LEUKOCYTES: NEGATIVE
MULTISTIX LOT#: NORMAL NUMERIC
NITRITE UR QL STRIP.AUTO: NEGATIVE
NITRITE, URINE: NEGATIVE
OCCULT BLOOD: NEGATIVE
PH UR: 5 [PH] (ref 5–8)
PH, URINE: 6 (ref 4.5–8)
PROT UR-MCNC: NEGATIVE MG/DL
PROTEIN (URINE DIPSTICK): NEGATIVE MG/DL
SP GR UR STRIP: >1.03 (ref 1–1.03)
SPECIFIC GRAVITY: 1.03 (ref 1–1.03)
UROBILINOGEN UR STRIP-ACNC: <2
UROBILINOGEN,SEMI-QN: 0.2 MG/DL (ref 0–1.9)
VIT C UR-MCNC: NEGATIVE MG/DL

## 2022-03-16 PROCEDURE — 81003 URINALYSIS AUTO W/O SCOPE: CPT | Performed by: NURSE PRACTITIONER

## 2022-03-16 PROCEDURE — 3008F BODY MASS INDEX DOCD: CPT | Performed by: NURSE PRACTITIONER

## 2022-03-16 PROCEDURE — 99214 OFFICE O/P EST MOD 30 MIN: CPT | Performed by: NURSE PRACTITIONER

## 2022-03-16 PROCEDURE — 3074F SYST BP LT 130 MM HG: CPT | Performed by: NURSE PRACTITIONER

## 2022-03-16 PROCEDURE — 3078F DIAST BP <80 MM HG: CPT | Performed by: NURSE PRACTITIONER

## 2022-03-16 RX ORDER — ACYCLOVIR 400 MG/1
400 TABLET ORAL 3 TIMES DAILY
Qty: 15 TABLET | Refills: 0 | Status: SHIPPED | OUTPATIENT
Start: 2022-03-16 | End: 2022-03-21

## 2022-03-17 ENCOUNTER — LAB ENCOUNTER (OUTPATIENT)
Dept: LAB | Age: 37
End: 2022-03-17
Attending: ALLERGY & IMMUNOLOGY
Payer: MEDICAID

## 2022-03-17 ENCOUNTER — NURSE ONLY (OUTPATIENT)
Dept: ALLERGY | Facility: CLINIC | Age: 37
End: 2022-03-17
Payer: MEDICAID

## 2022-03-17 ENCOUNTER — OFFICE VISIT (OUTPATIENT)
Dept: ALLERGY | Facility: CLINIC | Age: 37
End: 2022-03-17
Payer: MEDICAID

## 2022-03-17 VITALS
OXYGEN SATURATION: 99 % | DIASTOLIC BLOOD PRESSURE: 90 MMHG | BODY MASS INDEX: 25.69 KG/M2 | HEART RATE: 81 BPM | HEIGHT: 63 IN | SYSTOLIC BLOOD PRESSURE: 123 MMHG | WEIGHT: 145 LBS

## 2022-03-17 DIAGNOSIS — J30.89 ENVIRONMENTAL AND SEASONAL ALLERGIES: ICD-10-CM

## 2022-03-17 DIAGNOSIS — Z91.018 FOOD ALLERGY: Primary | ICD-10-CM

## 2022-03-17 DIAGNOSIS — Z91.018 FOOD ALLERGY: ICD-10-CM

## 2022-03-17 DIAGNOSIS — Z28.21 COVID-19 VACCINE DOSE DECLINED: ICD-10-CM

## 2022-03-17 DIAGNOSIS — J30.1 SEASONAL ALLERGIC RHINITIS DUE TO POLLEN: ICD-10-CM

## 2022-03-17 DIAGNOSIS — R14.0 ABDOMINAL BLOATING: ICD-10-CM

## 2022-03-17 DIAGNOSIS — T78.1XXA ADVERSE FOOD REACTION, INITIAL ENCOUNTER: ICD-10-CM

## 2022-03-17 LAB — IGA SERPL-MCNC: 206 MG/DL (ref 70–312)

## 2022-03-17 PROCEDURE — 3074F SYST BP LT 130 MM HG: CPT | Performed by: ALLERGY & IMMUNOLOGY

## 2022-03-17 PROCEDURE — 86364 TISS TRNSGLTMNASE EA IG CLAS: CPT

## 2022-03-17 PROCEDURE — 95004 PERQ TESTS W/ALRGNC XTRCS: CPT | Performed by: ALLERGY & IMMUNOLOGY

## 2022-03-17 PROCEDURE — 36415 COLL VENOUS BLD VENIPUNCTURE: CPT

## 2022-03-17 PROCEDURE — 3008F BODY MASS INDEX DOCD: CPT | Performed by: ALLERGY & IMMUNOLOGY

## 2022-03-17 PROCEDURE — 3080F DIAST BP >= 90 MM HG: CPT | Performed by: ALLERGY & IMMUNOLOGY

## 2022-03-17 PROCEDURE — 82784 ASSAY IGA/IGD/IGG/IGM EACH: CPT

## 2022-03-17 PROCEDURE — 95024 IQ TESTS W/ALLERGENIC XTRCS: CPT | Performed by: ALLERGY & IMMUNOLOGY

## 2022-03-17 PROCEDURE — 99244 OFF/OP CNSLTJ NEW/EST MOD 40: CPT | Performed by: ALLERGY & IMMUNOLOGY

## 2022-03-17 RX ORDER — CHOLECALCIFEROL (VITAMIN D3) 125 MCG
CAPSULE ORAL
COMMUNITY

## 2022-03-17 RX ORDER — RIBOFLAVIN (VITAMIN B2) 100 MG
TABLET ORAL
COMMUNITY

## 2022-03-17 NOTE — PATIENT INSTRUCTIONS
#1 Food allergy/adverse food reaction  See above skin testing to screen for an IgE mediated process. Foods by history include wheat more so than milk and eggs. Recommend to avoid those foods were positive on skin testing. May continue to consume those foods that are negative on skin testing from an allergy standpoint if celiac testing is negative  Handouts on food allergies versus food intolerances provided and reviewed  Reviewed potential lactose intolerance with milk  May consider Lactaid milk or Lactaid pills when eating dairy  to supplement the digestive enzyme  Check celiac panel    #2 allergic rhinitis  Patient reports increasing symptoms on an indoor basis including with dust.  Reviewed prior testing in 2015 being positive to pollens. Above skin testing to reevaluate for allergic triggers  Zyrtec 10 mg or Xyzal 5 mg as an antihistamine  Flonase or Nasacort 2 sprays per nostril once a day. If not improving with Zyrtec or Xyzal    #3 COVID vaccine declined. No COVID vaccines today.   COVID vaccine recommended    #4 recommend flu vaccine in the fall

## 2022-03-18 LAB
GENITAL VAGINOSIS SCREEN: NEGATIVE
TRICHOMONAS SCREEN: NEGATIVE
TTG IGA SER-ACNC: 0.5 U/ML (ref ?–7)

## 2022-03-23 ENCOUNTER — TELEPHONE (OUTPATIENT)
Dept: INTERNAL MEDICINE CLINIC | Facility: CLINIC | Age: 37
End: 2022-03-23

## 2022-03-23 NOTE — TELEPHONE ENCOUNTER
Pt would like a referral to see Dr. Maria Fernanda Avila. Pt says she has appt on 3/24. I advised to rech since referral takes 5-7 business days.  Please advise

## 2022-03-24 ENCOUNTER — OFFICE VISIT (OUTPATIENT)
Dept: ENDOCRINOLOGY CLINIC | Facility: CLINIC | Age: 37
End: 2022-03-24
Payer: MEDICAID

## 2022-03-24 VITALS
SYSTOLIC BLOOD PRESSURE: 141 MMHG | RESPIRATION RATE: 18 BRPM | BODY MASS INDEX: 25.69 KG/M2 | HEIGHT: 63 IN | HEART RATE: 92 BPM | WEIGHT: 145 LBS | DIASTOLIC BLOOD PRESSURE: 106 MMHG

## 2022-03-24 DIAGNOSIS — N92.1 MENORRHAGIA WITH IRREGULAR CYCLE: ICD-10-CM

## 2022-03-24 DIAGNOSIS — L68.0 HIRSUTISM: ICD-10-CM

## 2022-03-24 DIAGNOSIS — L65.9 ALOPECIA: Primary | ICD-10-CM

## 2022-03-24 PROCEDURE — 3077F SYST BP >= 140 MM HG: CPT | Performed by: INTERNAL MEDICINE

## 2022-03-24 PROCEDURE — 3008F BODY MASS INDEX DOCD: CPT | Performed by: INTERNAL MEDICINE

## 2022-03-24 PROCEDURE — 3080F DIAST BP >= 90 MM HG: CPT | Performed by: INTERNAL MEDICINE

## 2022-03-24 PROCEDURE — 99204 OFFICE O/P NEW MOD 45 MIN: CPT | Performed by: INTERNAL MEDICINE

## 2022-03-24 NOTE — PATIENT INSTRUCTIONS
Dr. Sakina Soto [facility]  [phone]    Dr. Jhony De Leon  [email or web address]    Dr. Dewitt Geff  [phone]  [email or web address] [facility]    Dr. Sidney Lamar  [phone]  [email or web address]    Dr. Seema Arroyo  [phone]  [email or web address]    Dr. Lanis Mcburney  [phone]  [email or web address]    Dr. Lobito Corbin  [phone]  [email or web address]

## 2022-03-25 ENCOUNTER — LAB ENCOUNTER (OUTPATIENT)
Dept: LAB | Facility: HOSPITAL | Age: 37
End: 2022-03-25
Attending: INTERNAL MEDICINE
Payer: MEDICAID

## 2022-03-25 ENCOUNTER — OFFICE VISIT (OUTPATIENT)
Dept: DERMATOLOGY CLINIC | Facility: CLINIC | Age: 37
End: 2022-03-25
Payer: MEDICAID

## 2022-03-25 DIAGNOSIS — B07.8 VERRUCA PLANA: ICD-10-CM

## 2022-03-25 DIAGNOSIS — L65.9 ALOPECIA: ICD-10-CM

## 2022-03-25 DIAGNOSIS — L68.0 HIRSUTISM: ICD-10-CM

## 2022-03-25 DIAGNOSIS — L70.0 ACNE VULGARIS: Primary | ICD-10-CM

## 2022-03-25 DIAGNOSIS — L30.9 DERMATITIS: ICD-10-CM

## 2022-03-25 DIAGNOSIS — D23.9 BENIGN NEOPLASM OF SKIN, UNSPECIFIED LOCATION: ICD-10-CM

## 2022-03-25 LAB — DHEA-S SERPL-MCNC: 174.3 UG/DL

## 2022-03-25 PROCEDURE — 36415 COLL VENOUS BLD VENIPUNCTURE: CPT

## 2022-03-25 PROCEDURE — 82627 DEHYDROEPIANDROSTERONE: CPT

## 2022-03-25 PROCEDURE — 99214 OFFICE O/P EST MOD 30 MIN: CPT | Performed by: DERMATOLOGY

## 2022-03-25 PROCEDURE — 84403 ASSAY OF TOTAL TESTOSTERONE: CPT

## 2022-03-25 PROCEDURE — 84402 ASSAY OF FREE TESTOSTERONE: CPT

## 2022-03-25 RX ORDER — SALICYLIC ACID 60 MG/G
GEL TOPICAL
Qty: 60 G | Refills: 0 | Status: SHIPPED | OUTPATIENT
Start: 2022-03-25

## 2022-03-25 RX ORDER — KETOCONAZOLE 20 MG/ML
SHAMPOO TOPICAL
Qty: 120 ML | Refills: 11 | Status: SHIPPED | OUTPATIENT
Start: 2022-03-25

## 2022-03-25 RX ORDER — CLOBETASOL PROPIONATE 0.46 MG/ML
SOLUTION TOPICAL
Qty: 50 ML | Refills: 5 | Status: SHIPPED | OUTPATIENT
Start: 2022-03-25

## 2022-03-28 ENCOUNTER — TELEPHONE (OUTPATIENT)
Dept: ALLERGY | Facility: CLINIC | Age: 37
End: 2022-03-28

## 2022-03-28 NOTE — TELEPHONE ENCOUNTER
Spoke with patient. Verified name and . Informed patient of negative Celiac panel per Dr. Kymberly Felipe, note via mychart. Patient verbalizes understanding,no further questions at this time.

## 2022-03-31 LAB
TESTOSTERONE, FREE, S: 0.72 NG/DL
TESTOSTERONE, TOTAL, S: 38 NG/DL

## 2022-04-06 ENCOUNTER — OFFICE VISIT (OUTPATIENT)
Dept: INTERNAL MEDICINE CLINIC | Facility: CLINIC | Age: 37
End: 2022-04-06
Payer: MEDICAID

## 2022-04-06 ENCOUNTER — OFFICE VISIT (OUTPATIENT)
Dept: OBGYN CLINIC | Facility: CLINIC | Age: 37
End: 2022-04-06
Payer: MEDICAID

## 2022-04-06 ENCOUNTER — LAB ENCOUNTER (OUTPATIENT)
Dept: LAB | Facility: HOSPITAL | Age: 37
End: 2022-04-06
Attending: OBSTETRICS & GYNECOLOGY
Payer: MEDICAID

## 2022-04-06 VITALS
BODY MASS INDEX: 26.22 KG/M2 | WEIGHT: 148 LBS | HEIGHT: 63 IN | TEMPERATURE: 98 F | SYSTOLIC BLOOD PRESSURE: 126 MMHG | HEART RATE: 79 BPM | DIASTOLIC BLOOD PRESSURE: 87 MMHG

## 2022-04-06 VITALS
BODY MASS INDEX: 26 KG/M2 | SYSTOLIC BLOOD PRESSURE: 118 MMHG | DIASTOLIC BLOOD PRESSURE: 81 MMHG | HEART RATE: 92 BPM | WEIGHT: 148.38 LBS

## 2022-04-06 DIAGNOSIS — N92.1 MENORRHAGIA WITH IRREGULAR CYCLE: ICD-10-CM

## 2022-04-06 DIAGNOSIS — Z11.3 SCREEN FOR STD (SEXUALLY TRANSMITTED DISEASE): ICD-10-CM

## 2022-04-06 DIAGNOSIS — B00.1 HERPES LABIALIS: Primary | ICD-10-CM

## 2022-04-06 DIAGNOSIS — B00.1 COLD SORE: ICD-10-CM

## 2022-04-06 DIAGNOSIS — Z01.411 ENCOUNTER FOR GYNECOLOGICAL EXAMINATION WITH ABNORMAL FINDING: Primary | ICD-10-CM

## 2022-04-06 LAB
HBV SURFACE AG SER-ACNC: <0.1 [IU]/L
HBV SURFACE AG SERPL QL IA: NONREACTIVE
HCV AB SERPL QL IA: NONREACTIVE

## 2022-04-06 PROCEDURE — 87661 TRICHOMONAS VAGINALIS AMPLIF: CPT | Performed by: OBSTETRICS & GYNECOLOGY

## 2022-04-06 PROCEDURE — 87491 CHLMYD TRACH DNA AMP PROBE: CPT | Performed by: OBSTETRICS & GYNECOLOGY

## 2022-04-06 PROCEDURE — 3008F BODY MASS INDEX DOCD: CPT | Performed by: PHYSICIAN ASSISTANT

## 2022-04-06 PROCEDURE — 86780 TREPONEMA PALLIDUM: CPT

## 2022-04-06 PROCEDURE — 87591 N.GONORRHOEAE DNA AMP PROB: CPT | Performed by: OBSTETRICS & GYNECOLOGY

## 2022-04-06 PROCEDURE — 87389 HIV-1 AG W/HIV-1&-2 AB AG IA: CPT

## 2022-04-06 PROCEDURE — 36415 COLL VENOUS BLD VENIPUNCTURE: CPT

## 2022-04-06 PROCEDURE — 3079F DIAST BP 80-89 MM HG: CPT | Performed by: PHYSICIAN ASSISTANT

## 2022-04-06 PROCEDURE — 86803 HEPATITIS C AB TEST: CPT

## 2022-04-06 PROCEDURE — 87340 HEPATITIS B SURFACE AG IA: CPT

## 2022-04-06 PROCEDURE — 99213 OFFICE O/P EST LOW 20 MIN: CPT | Performed by: PHYSICIAN ASSISTANT

## 2022-04-06 PROCEDURE — 3074F SYST BP LT 130 MM HG: CPT | Performed by: PHYSICIAN ASSISTANT

## 2022-04-06 RX ORDER — ACYCLOVIR 50 MG/G
OINTMENT TOPICAL
Qty: 30 G | Refills: 0 | Status: SHIPPED | OUTPATIENT
Start: 2022-04-06

## 2022-04-06 RX ORDER — VALACYCLOVIR HYDROCHLORIDE 1 G/1
TABLET, FILM COATED ORAL
Qty: 4 TABLET | Refills: 0 | Status: SHIPPED | OUTPATIENT
Start: 2022-04-06

## 2022-04-06 RX ORDER — VALACYCLOVIR HYDROCHLORIDE 1 G/1
TABLET, FILM COATED ORAL
Qty: 10 TABLET | Refills: 3 | Status: CANCELLED | OUTPATIENT
Start: 2022-04-06

## 2022-04-07 ENCOUNTER — TELEPHONE (OUTPATIENT)
Dept: INTERNAL MEDICINE CLINIC | Facility: CLINIC | Age: 37
End: 2022-04-07

## 2022-04-07 NOTE — TELEPHONE ENCOUNTER
Patient called to follow up on prior authorization. Patient would like return call with status.      acyclovir 5 % External Ointment

## 2022-04-07 NOTE — TELEPHONE ENCOUNTER
Kacy Nava states she has the information needed for this patient and does not require additional. information.    Reference-case #[id number]

## 2022-04-07 NOTE — TELEPHONE ENCOUNTER
Clinical triage support:   Per CSS patient states acyclovir ointment was not at the pharmacy. I see it wasn't released because it was sent for PA?   Please f/u

## 2022-04-07 NOTE — TELEPHONE ENCOUNTER
Patient calling, identified name and , asking about her Acyclovir ointment , needs prior auth , Patient aware that it may take a few days       Informed that po Valcyclovir has been ordered by Berna Edgar Miguel     Patient verbalizes understanding and agrees.

## 2022-04-08 LAB
C TRACH DNA SPEC QL NAA+PROBE: NEGATIVE
N GONORRHOEA DNA SPEC QL NAA+PROBE: NEGATIVE
T PALLIDUM AB SER QL: NEGATIVE
T VAGINALIS RRNA SPEC QL NAA+PROBE: NEGATIVE

## 2022-04-11 ENCOUNTER — MED REC SCAN ONLY (OUTPATIENT)
Dept: INTERNAL MEDICINE CLINIC | Facility: CLINIC | Age: 37
End: 2022-04-11

## 2022-04-11 ENCOUNTER — TELEPHONE (OUTPATIENT)
Dept: INTERNAL MEDICINE CLINIC | Facility: CLINIC | Age: 37
End: 2022-04-11

## 2022-04-12 ENCOUNTER — TELEPHONE (OUTPATIENT)
Dept: ENDOCRINOLOGY CLINIC | Facility: CLINIC | Age: 37
End: 2022-04-12

## 2022-04-12 NOTE — TELEPHONE ENCOUNTER
LM returning patient's call - Dr. Gatito Portillo lab result note:  Joseph Cuellar! I have reviewed your results and they are within normal limits. We do not need to make any changes in your management at this time. Please feel free to contact me with any questions you may have. Have a great day! Cassy Beavers MD   Written by Lucretia Vaughn MD on 4/2/2022 12:29 AM CDT  Seen by patient Debbie Thomas on 4/8/2022 10:54 AM

## 2022-04-15 NOTE — TELEPHONE ENCOUNTER
Spoke to patient regarding note results below. Patient verbalized understanding, and did not have any additional questions at this time.

## 2022-06-13 ENCOUNTER — OFFICE VISIT (OUTPATIENT)
Dept: INTERNAL MEDICINE CLINIC | Facility: CLINIC | Age: 37
End: 2022-06-13
Payer: MEDICAID

## 2022-06-13 ENCOUNTER — TELEPHONE (OUTPATIENT)
Dept: INTERNAL MEDICINE CLINIC | Facility: CLINIC | Age: 37
End: 2022-06-13

## 2022-06-13 VITALS
BODY MASS INDEX: 26.22 KG/M2 | WEIGHT: 148 LBS | HEART RATE: 80 BPM | SYSTOLIC BLOOD PRESSURE: 120 MMHG | DIASTOLIC BLOOD PRESSURE: 80 MMHG | HEIGHT: 63 IN

## 2022-06-13 DIAGNOSIS — R22.1 MASS OF RIGHT SIDE OF NECK: ICD-10-CM

## 2022-06-13 DIAGNOSIS — I10 PRIMARY HYPERTENSION: ICD-10-CM

## 2022-06-13 DIAGNOSIS — Z00.00 PHYSICAL EXAM: ICD-10-CM

## 2022-06-13 DIAGNOSIS — B00.1 HERPES LABIALIS: Primary | ICD-10-CM

## 2022-06-13 RX ORDER — ACYCLOVIR 50 MG/G
OINTMENT TOPICAL
Qty: 1 EACH | Refills: 1 | Status: SHIPPED | OUTPATIENT
Start: 2022-06-13

## 2022-06-13 RX ORDER — FAMOTIDINE 20 MG/1
20 TABLET, FILM COATED ORAL
Qty: 180 TABLET | Refills: 1 | Status: SHIPPED | OUTPATIENT
Start: 2022-06-13

## 2022-08-22 ENCOUNTER — TELEPHONE (OUTPATIENT)
Dept: INTERNAL MEDICINE CLINIC | Facility: CLINIC | Age: 37
End: 2022-08-22

## 2022-10-12 ENCOUNTER — HOSPITAL ENCOUNTER (OUTPATIENT)
Dept: ULTRASOUND IMAGING | Facility: HOSPITAL | Age: 37
Discharge: HOME | End: 2022-10-12
Attending: INTERNAL MEDICINE
Payer: MEDICAID

## 2022-10-12 DIAGNOSIS — R22.1 MASS OF RIGHT SIDE OF NECK: ICD-10-CM

## 2022-10-12 PROCEDURE — 76536 US EXAM OF HEAD AND NECK: CPT | Performed by: INTERNAL MEDICINE

## 2022-10-13 ENCOUNTER — TELEPHONE (OUTPATIENT)
Dept: INTERNAL MEDICINE CLINIC | Facility: CLINIC | Age: 37
End: 2022-10-13

## 2022-10-17 ENCOUNTER — TELEPHONE (OUTPATIENT)
Dept: HEMATOLOGY/ONCOLOGY | Facility: HOSPITAL | Age: 37
End: 2022-10-17

## 2022-10-18 ENCOUNTER — OFFICE VISIT (OUTPATIENT)
Dept: HEMATOLOGY/ONCOLOGY | Facility: HOSPITAL | Age: 37
End: 2022-10-18
Attending: INTERNAL MEDICINE
Payer: MEDICAID

## 2022-10-18 VITALS
RESPIRATION RATE: 16 BRPM | HEART RATE: 88 BPM | DIASTOLIC BLOOD PRESSURE: 87 MMHG | BODY MASS INDEX: 27.46 KG/M2 | OXYGEN SATURATION: 97 % | WEIGHT: 155 LBS | SYSTOLIC BLOOD PRESSURE: 138 MMHG | TEMPERATURE: 98 F | HEIGHT: 63 IN

## 2022-10-18 DIAGNOSIS — R59.1 LYMPHADENOPATHY: Primary | ICD-10-CM

## 2022-10-18 PROCEDURE — 99244 OFF/OP CNSLTJ NEW/EST MOD 40: CPT | Performed by: INTERNAL MEDICINE

## 2022-10-19 NOTE — CONSULTS
[facility]    PATIENT'S NAME: Henri Kayser   CONSULTING PHYSICIAN: Giulia Obrien MD   PATIENT ACCOUNT #: [de-identified] LOCATION: [address] [place] [address] RECORD #: [id number] YOB: 1985   CONSULTATION DATE: 10/18/2022       [facility] NEW PATIENT CONSULT    REQUESTING PHYSICIAN:  Yue Parra MD.    REASON FOR CONSULTATION:  Lymphadenopathy. HISTORY OF PRESENT ILLNESS:  The patient is a pleasant 71-year-old female being evaluated by Medical Oncology for a right neck adenopathy. The patient had a routine physical exam with her primary care physician June 2022, where it was documenting that there was a 3 cm right neck mass. She went to have an ultrasound of the head and neck 10/12/2022 that showed a probable 1.3 cm lymph node in the right cervical region. The patient states she feels well overall. Denies any fevers, chills, night sweats, or unintentional weight loss. She does feel like she has had some sweats at night during the summer. However, this has subsided as the weather has changed. She denies any bruising or bleeding. She did complete her activities of daily living. She denies any other areas of adenopathy. She is otherwise without complaints. PAST MEDICAL HISTORY:  Breast fibroadenoma, alopecia, herpes labialis, menorrhagia, gastroesophageal reflux disease, hypertension, hirsutism. MEDICATIONS:  Acyclovir ointment, famotidine, ketoconazole shampoo, triamcinolone, multivitamin, amlodipine, tramadol. ALLERGIES:  Minocycline. SOCIAL HISTORY:  Denies any alcohol, tobacco, or illicit drug use. FAMILY MEDICAL HISTORY:  No history of any malignancy in the family, specifically in the mother and father. REVIEW OF SYSTEMS:  All other systems reviewed and negative x12. PHYSICAL EXAMINATION:    GENERAL:  No acute distress. Alert and oriented. VITAL SIGNS:  ECOG performance status is 0. Weight 70 kg.   Blood pressure is 138/87, pulse is 88, respiratory rate 16, temperature 37.9 Celsius, pulse ox 97% on room air. HEENT:  Moist mucous membranes. Oropharynx clear. NECK:  Supple. LUNGS:  Symmetric expansion. HEART:  Good distal pulses. ABDOMEN:  Soft. EXTREMITIES:  No edema. SKIN:  No visible lesions. LYMPHATICS:  No palpable peripheral adenopathy. NEUROLOGIC:  Moving all extremities. Cranial nerves intact. PSYCHIATRIC:  Appropriate mood, appropriate affect. LABORATORY DATA:  From 04/06/2022 reviewed, including hepatitis C nonreactive, HIV nonreactive, testosterone 38. CBC from 01/22/2022, white blood cell count 6000, hemoglobin 14.5, platelets 073,693. IMAGING:  Ultrasound reviewed as per HPI. Please see above for details. ASSESSMENT AND PLAN:  The patient is a pleasant 80-year-old female being evaluated by Oncology for lymphadenopathy of the right neck. The patient is otherwise relatively asymptomatic. She has a 1.7 cm right neck lymph node on ultrasound of unclear significance. Per previous physical examination documentation, this seems to have improved. We will repeat an ultrasound in 6 to 8 weeks. The patient will call us if there are any further changes. Thank you very much for the consultation request and for allowing us to participate in the care of this delightful patient. Dictated By Fatemeh Hernandez MD  d: 10/18/2022 17:42:14  t: 10/19/2022 03:44:05  Job [id number]  BCM/    cc: Ana Luisa Olson MD

## 2022-12-20 ENCOUNTER — APPOINTMENT (OUTPATIENT)
Dept: HEMATOLOGY/ONCOLOGY | Facility: HOSPITAL | Age: 37
End: 2022-12-20
Attending: INTERNAL MEDICINE
Payer: MEDICAID

## 2023-01-11 RX ORDER — ACYCLOVIR 400 MG/1
TABLET ORAL
Qty: 15 TABLET | Refills: 1 | Status: SHIPPED | OUTPATIENT
Start: 2023-01-11

## 2023-01-11 NOTE — TELEPHONE ENCOUNTER
Patient stated that cold sore on lips started yesterday and worse today. Requesting a refill on acyclovir tablets. Please advise.

## 2023-01-12 ENCOUNTER — HOSPITAL ENCOUNTER (OUTPATIENT)
Dept: ULTRASOUND IMAGING | Facility: HOSPITAL | Age: 38
Discharge: HOME | End: 2023-01-12
Attending: INTERNAL MEDICINE
Payer: MEDICAID

## 2023-01-12 DIAGNOSIS — R59.1 LYMPHADENOPATHY: ICD-10-CM

## 2023-01-12 PROCEDURE — 76536 US EXAM OF HEAD AND NECK: CPT | Performed by: INTERNAL MEDICINE

## 2023-01-17 ENCOUNTER — OFFICE VISIT (OUTPATIENT)
Dept: HEMATOLOGY/ONCOLOGY | Facility: HOSPITAL | Age: 38
End: 2023-01-17
Attending: INTERNAL MEDICINE
Payer: MEDICAID

## 2023-01-17 ENCOUNTER — TELEPHONE (OUTPATIENT)
Dept: OTOLARYNGOLOGY | Facility: CLINIC | Age: 38
End: 2023-01-17

## 2023-01-17 VITALS
OXYGEN SATURATION: 97 % | HEART RATE: 85 BPM | DIASTOLIC BLOOD PRESSURE: 91 MMHG | RESPIRATION RATE: 16 BRPM | SYSTOLIC BLOOD PRESSURE: 128 MMHG | WEIGHT: 141 LBS | BODY MASS INDEX: 24.98 KG/M2 | TEMPERATURE: 99 F | HEIGHT: 63 IN

## 2023-01-17 DIAGNOSIS — R59.1 LYMPHADENOPATHY: Primary | ICD-10-CM

## 2023-01-17 PROCEDURE — 99213 OFFICE O/P EST LOW 20 MIN: CPT | Performed by: INTERNAL MEDICINE

## 2023-01-20 ENCOUNTER — OFFICE VISIT (OUTPATIENT)
Dept: OTOLARYNGOLOGY | Facility: CLINIC | Age: 38
End: 2023-01-20

## 2023-01-20 ENCOUNTER — LAB ENCOUNTER (OUTPATIENT)
Dept: LAB | Facility: HOSPITAL | Age: 38
End: 2023-01-20
Attending: STUDENT IN AN ORGANIZED HEALTH CARE EDUCATION/TRAINING PROGRAM
Payer: MEDICAID

## 2023-01-20 ENCOUNTER — TELEPHONE (OUTPATIENT)
Dept: OTOLARYNGOLOGY | Facility: CLINIC | Age: 38
End: 2023-01-20

## 2023-01-20 VITALS — TEMPERATURE: 98 F | HEIGHT: 63 IN | WEIGHT: 141 LBS | BODY MASS INDEX: 24.98 KG/M2

## 2023-01-20 DIAGNOSIS — Z00.00 PHYSICAL EXAM: ICD-10-CM

## 2023-01-20 DIAGNOSIS — R22.1 NECK MASS: Primary | ICD-10-CM

## 2023-01-20 DIAGNOSIS — R22.1 NECK MASS: ICD-10-CM

## 2023-01-20 LAB
ALBUMIN SERPL-MCNC: 3.8 G/DL (ref 3.4–5)
ALBUMIN/GLOB SERPL: 1.1 {RATIO} (ref 1–2)
ALP LIVER SERPL-CCNC: 43 U/L
ALT SERPL-CCNC: 21 U/L
ANION GAP SERPL CALC-SCNC: 5 MMOL/L (ref 0–18)
AST SERPL-CCNC: 15 U/L (ref 15–37)
BASOPHILS # BLD AUTO: 0.04 X10(3) UL (ref 0–0.2)
BASOPHILS NFR BLD AUTO: 0.9 %
BILIRUB SERPL-MCNC: 0.7 MG/DL (ref 0.1–2)
BILIRUB UR QL: NEGATIVE
BUN BLD-MCNC: 15 MG/DL (ref 7–18)
BUN/CREAT SERPL: 22.1 (ref 10–20)
CALCIUM BLD-MCNC: 9 MG/DL (ref 8.5–10.1)
CHLORIDE SERPL-SCNC: 106 MMOL/L (ref 98–112)
CLARITY UR: CLEAR
CO2 SERPL-SCNC: 27 MMOL/L (ref 21–32)
COLOR UR: YELLOW
CREAT BLD-MCNC: 0.68 MG/DL
CRP SERPL-MCNC: <0.29 MG/DL (ref ?–0.3)
DEPRECATED RDW RBC AUTO: 43.1 FL (ref 35.1–46.3)
EOSINOPHIL # BLD AUTO: 0.06 X10(3) UL (ref 0–0.7)
EOSINOPHIL NFR BLD AUTO: 1.3 %
ERYTHROCYTE [DISTWIDTH] IN BLOOD BY AUTOMATED COUNT: 12.2 % (ref 11–15)
ERYTHROCYTE [SEDIMENTATION RATE] IN BLOOD: 9 MM/HR
FASTING STATUS PATIENT QL REPORTED: NO
GFR SERPLBLD BASED ON 1.73 SQ M-ARVRAT: 115 ML/MIN/1.73M2 (ref 60–?)
GLOBULIN PLAS-MCNC: 3.5 G/DL (ref 2.8–4.4)
GLUCOSE BLD-MCNC: 82 MG/DL (ref 70–99)
GLUCOSE UR-MCNC: NEGATIVE MG/DL
HCT VFR BLD AUTO: 44.4 %
HGB BLD-MCNC: 14.7 G/DL
HGB UR QL STRIP.AUTO: NEGATIVE
IMM GRANULOCYTES # BLD AUTO: 0.01 X10(3) UL (ref 0–1)
IMM GRANULOCYTES NFR BLD: 0.2 %
KETONES UR-MCNC: 15 MG/DL
LEUKOCYTE ESTERASE UR QL STRIP.AUTO: NEGATIVE
LYMPHOCYTES # BLD AUTO: 1.82 X10(3) UL (ref 1–4)
LYMPHOCYTES NFR BLD AUTO: 40.4 %
MCH RBC QN AUTO: 31.7 PG (ref 26–34)
MCHC RBC AUTO-ENTMCNC: 33.1 G/DL (ref 31–37)
MCV RBC AUTO: 95.7 FL
MONOCYTES # BLD AUTO: 0.28 X10(3) UL (ref 0.1–1)
MONOCYTES NFR BLD AUTO: 6.2 %
NEUTROPHILS # BLD AUTO: 2.3 X10 (3) UL (ref 1.5–7.7)
NEUTROPHILS # BLD AUTO: 2.3 X10(3) UL (ref 1.5–7.7)
NEUTROPHILS NFR BLD AUTO: 51 %
NITRITE UR QL STRIP.AUTO: NEGATIVE
OSMOLALITY SERPL CALC.SUM OF ELEC: 286 MOSM/KG (ref 275–295)
PH UR: 6 [PH] (ref 5–8)
PLATELET # BLD AUTO: 316 10(3)UL (ref 150–450)
POTASSIUM SERPL-SCNC: 3.7 MMOL/L (ref 3.5–5.1)
PROT SERPL-MCNC: 7.3 G/DL (ref 6.4–8.2)
PROT UR-MCNC: NEGATIVE MG/DL
RBC # BLD AUTO: 4.64 X10(6)UL
SODIUM SERPL-SCNC: 138 MMOL/L (ref 136–145)
SP GR UR STRIP: 1.02 (ref 1–1.03)
UROBILINOGEN UR STRIP-ACNC: 0.2
WBC # BLD AUTO: 4.5 X10(3) UL (ref 4–11)

## 2023-01-20 PROCEDURE — 85652 RBC SED RATE AUTOMATED: CPT

## 2023-01-20 PROCEDURE — 99204 OFFICE O/P NEW MOD 45 MIN: CPT | Performed by: STUDENT IN AN ORGANIZED HEALTH CARE EDUCATION/TRAINING PROGRAM

## 2023-01-20 PROCEDURE — 85025 COMPLETE CBC W/AUTO DIFF WBC: CPT

## 2023-01-20 PROCEDURE — 3008F BODY MASS INDEX DOCD: CPT | Performed by: STUDENT IN AN ORGANIZED HEALTH CARE EDUCATION/TRAINING PROGRAM

## 2023-01-20 PROCEDURE — 86140 C-REACTIVE PROTEIN: CPT

## 2023-01-20 PROCEDURE — 80053 COMPREHEN METABOLIC PANEL: CPT

## 2023-01-20 PROCEDURE — 86480 TB TEST CELL IMMUN MEASURE: CPT

## 2023-01-20 PROCEDURE — 36415 COLL VENOUS BLD VENIPUNCTURE: CPT

## 2023-01-20 PROCEDURE — 81003 URINALYSIS AUTO W/O SCOPE: CPT

## 2023-01-20 PROCEDURE — 31231 NASAL ENDOSCOPY DX: CPT | Performed by: STUDENT IN AN ORGANIZED HEALTH CARE EDUCATION/TRAINING PROGRAM

## 2023-01-20 RX ORDER — AMOXICILLIN AND CLAVULANATE POTASSIUM 875; 125 MG/1; MG/1
1 TABLET, FILM COATED ORAL EVERY 12 HOURS
Qty: 14 TABLET | Refills: 0 | Status: SHIPPED | OUTPATIENT
Start: 2023-01-20 | End: 2023-01-27

## 2023-01-20 RX ORDER — METHYLPREDNISOLONE 4 MG/1
TABLET ORAL
Qty: 21 TABLET | Refills: 0 | Status: SHIPPED | OUTPATIENT
Start: 2023-01-20

## 2023-01-23 LAB
M TB IFN-G CD4+ T-CELLS BLD-ACNC: 0.19 IU/ML
M TB TUBERC IFN-G BLD QL: POSITIVE
M TB TUBERC IGNF/MITOGEN IGNF CONTROL: >10 IU/ML
QFT TB1 AG MINUS NIL: 0.7 IU/ML
QFT TB2 AG MINUS NIL: 0.61 IU/ML

## 2023-02-02 ENCOUNTER — LAB ENCOUNTER (OUTPATIENT)
Dept: LAB | Facility: HOSPITAL | Age: 38
End: 2023-02-02
Attending: INTERNAL MEDICINE
Payer: MEDICAID

## 2023-02-02 DIAGNOSIS — I10 PRIMARY HYPERTENSION: ICD-10-CM

## 2023-02-02 DIAGNOSIS — Z00.00 PHYSICAL EXAM: ICD-10-CM

## 2023-02-02 LAB
ALBUMIN SERPL-MCNC: 3.7 G/DL (ref 3.4–5)
ALBUMIN/GLOB SERPL: 1 {RATIO} (ref 1–2)
ALP LIVER SERPL-CCNC: 45 U/L
ALT SERPL-CCNC: 22 U/L
ANION GAP SERPL CALC-SCNC: 4 MMOL/L (ref 0–18)
AST SERPL-CCNC: 17 U/L (ref 15–37)
BASOPHILS # BLD AUTO: 0.03 X10(3) UL (ref 0–0.2)
BASOPHILS NFR BLD AUTO: 0.4 %
BILIRUB SERPL-MCNC: 0.3 MG/DL (ref 0.1–2)
BUN BLD-MCNC: 18 MG/DL (ref 7–18)
BUN/CREAT SERPL: 23.1 (ref 10–20)
CALCIUM BLD-MCNC: 9.1 MG/DL (ref 8.5–10.1)
CHLORIDE SERPL-SCNC: 107 MMOL/L (ref 98–112)
CHOLEST SERPL-MCNC: 316 MG/DL (ref ?–200)
CO2 SERPL-SCNC: 27 MMOL/L (ref 21–32)
CREAT BLD-MCNC: 0.78 MG/DL
DEPRECATED RDW RBC AUTO: 40 FL (ref 35.1–46.3)
EOSINOPHIL # BLD AUTO: 0.06 X10(3) UL (ref 0–0.7)
EOSINOPHIL NFR BLD AUTO: 0.9 %
ERYTHROCYTE [DISTWIDTH] IN BLOOD BY AUTOMATED COUNT: 11.7 % (ref 11–15)
FASTING PATIENT LIPID ANSWER: YES
FASTING STATUS PATIENT QL REPORTED: YES
GFR SERPLBLD BASED ON 1.73 SQ M-ARVRAT: 100 ML/MIN/1.73M2 (ref 60–?)
GLOBULIN PLAS-MCNC: 3.8 G/DL (ref 2.8–4.4)
GLUCOSE BLD-MCNC: 88 MG/DL (ref 70–99)
HCT VFR BLD AUTO: 44.2 %
HDLC SERPL-MCNC: 61 MG/DL (ref 40–59)
HGB BLD-MCNC: 14.6 G/DL
IMM GRANULOCYTES # BLD AUTO: 0.02 X10(3) UL (ref 0–1)
IMM GRANULOCYTES NFR BLD: 0.3 %
LDLC SERPL CALC-MCNC: 243 MG/DL (ref ?–100)
LYMPHOCYTES # BLD AUTO: 2.48 X10(3) UL (ref 1–4)
LYMPHOCYTES NFR BLD AUTO: 35.9 %
MCH RBC QN AUTO: 31.2 PG (ref 26–34)
MCHC RBC AUTO-ENTMCNC: 33 G/DL (ref 31–37)
MCV RBC AUTO: 94.4 FL
MONOCYTES # BLD AUTO: 0.45 X10(3) UL (ref 0.1–1)
MONOCYTES NFR BLD AUTO: 6.5 %
NEUTROPHILS # BLD AUTO: 3.87 X10 (3) UL (ref 1.5–7.7)
NEUTROPHILS # BLD AUTO: 3.87 X10(3) UL (ref 1.5–7.7)
NEUTROPHILS NFR BLD AUTO: 56 %
NONHDLC SERPL-MCNC: 255 MG/DL (ref ?–130)
OSMOLALITY SERPL CALC.SUM OF ELEC: 287 MOSM/KG (ref 275–295)
PLATELET # BLD AUTO: 342 10(3)UL (ref 150–450)
POTASSIUM SERPL-SCNC: 3.6 MMOL/L (ref 3.5–5.1)
PROT SERPL-MCNC: 7.5 G/DL (ref 6.4–8.2)
RBC # BLD AUTO: 4.68 X10(6)UL
SODIUM SERPL-SCNC: 138 MMOL/L (ref 136–145)
TRIGL SERPL-MCNC: 78 MG/DL (ref 30–149)
TSI SER-ACNC: 1.99 MIU/ML (ref 0.36–3.74)
VLDLC SERPL CALC-MCNC: 18 MG/DL (ref 0–30)
WBC # BLD AUTO: 6.9 X10(3) UL (ref 4–11)

## 2023-02-02 PROCEDURE — 80061 LIPID PANEL: CPT

## 2023-02-02 PROCEDURE — 80053 COMPREHEN METABOLIC PANEL: CPT

## 2023-02-02 PROCEDURE — 84443 ASSAY THYROID STIM HORMONE: CPT

## 2023-02-02 PROCEDURE — 36415 COLL VENOUS BLD VENIPUNCTURE: CPT

## 2023-02-02 PROCEDURE — 85025 COMPLETE CBC W/AUTO DIFF WBC: CPT

## 2023-02-07 ENCOUNTER — NURSE TRIAGE (OUTPATIENT)
Dept: INTERNAL MEDICINE CLINIC | Facility: CLINIC | Age: 38
End: 2023-02-07

## 2023-02-07 ENCOUNTER — OFFICE VISIT (OUTPATIENT)
Dept: INTERNAL MEDICINE CLINIC | Facility: CLINIC | Age: 38
End: 2023-02-07

## 2023-02-07 VITALS
WEIGHT: 141 LBS | SYSTOLIC BLOOD PRESSURE: 122 MMHG | DIASTOLIC BLOOD PRESSURE: 86 MMHG | BODY MASS INDEX: 24.98 KG/M2 | HEIGHT: 63 IN | HEART RATE: 78 BPM

## 2023-02-07 DIAGNOSIS — R22.0 MASS OF SCALP: Primary | ICD-10-CM

## 2023-02-07 PROCEDURE — 3008F BODY MASS INDEX DOCD: CPT | Performed by: NURSE PRACTITIONER

## 2023-02-07 PROCEDURE — 99213 OFFICE O/P EST LOW 20 MIN: CPT | Performed by: NURSE PRACTITIONER

## 2023-02-07 PROCEDURE — 3074F SYST BP LT 130 MM HG: CPT | Performed by: NURSE PRACTITIONER

## 2023-02-07 PROCEDURE — 3079F DIAST BP 80-89 MM HG: CPT | Performed by: NURSE PRACTITIONER

## 2023-02-07 RX ORDER — SULFAMETHOXAZOLE AND TRIMETHOPRIM 800; 160 MG/1; MG/1
1 TABLET ORAL 2 TIMES DAILY
Qty: 14 TABLET | Refills: 0 | Status: SHIPPED | OUTPATIENT
Start: 2023-02-07 | End: 2023-02-13

## 2023-02-13 ENCOUNTER — OFFICE VISIT (OUTPATIENT)
Dept: INTERNAL MEDICINE CLINIC | Facility: CLINIC | Age: 38
End: 2023-02-13

## 2023-02-13 VITALS
HEART RATE: 80 BPM | WEIGHT: 141 LBS | BODY MASS INDEX: 24.98 KG/M2 | SYSTOLIC BLOOD PRESSURE: 130 MMHG | HEIGHT: 63 IN | DIASTOLIC BLOOD PRESSURE: 80 MMHG

## 2023-02-13 DIAGNOSIS — R76.11 HISTORY OF POSITIVE PPD, TREATMENT STATUS UNKNOWN: Primary | ICD-10-CM

## 2023-02-13 DIAGNOSIS — R76.12 POSITIVE QUANTIFERON-TB GOLD TEST: ICD-10-CM

## 2023-02-13 DIAGNOSIS — I10 PRIMARY HYPERTENSION: ICD-10-CM

## 2023-02-13 RX ORDER — ACYCLOVIR 400 MG/1
400 TABLET ORAL 3 TIMES DAILY
Qty: 15 TABLET | Refills: 0 | Status: SHIPPED | OUTPATIENT
Start: 2023-02-13

## 2023-02-13 RX ORDER — AMLODIPINE BESYLATE 5 MG/1
5 TABLET ORAL DAILY
Qty: 90 TABLET | Refills: 3 | Status: SHIPPED | OUTPATIENT
Start: 2023-02-13

## 2023-02-15 ENCOUNTER — OFFICE VISIT (OUTPATIENT)
Dept: DERMATOLOGY CLINIC | Facility: CLINIC | Age: 38
End: 2023-02-15

## 2023-02-15 DIAGNOSIS — L30.9 DERMATITIS: ICD-10-CM

## 2023-02-15 DIAGNOSIS — L70.0 ACNE VULGARIS: Primary | ICD-10-CM

## 2023-02-15 DIAGNOSIS — L65.9 ALOPECIA: ICD-10-CM

## 2023-02-15 PROCEDURE — 99213 OFFICE O/P EST LOW 20 MIN: CPT | Performed by: DERMATOLOGY

## 2023-02-15 RX ORDER — AZITHROMYCIN 250 MG/1
TABLET, FILM COATED ORAL
Qty: 6 TABLET | Refills: 0 | Status: SHIPPED | OUTPATIENT
Start: 2023-02-15

## 2023-02-15 RX ORDER — TRETINOIN 1 MG/G
1 CREAM TOPICAL NIGHTLY
Qty: 45 G | Refills: 3 | Status: SHIPPED | OUTPATIENT
Start: 2023-02-15 | End: 2023-03-17

## 2023-02-16 ENCOUNTER — TELEPHONE (OUTPATIENT)
Dept: DERMATOLOGY CLINIC | Facility: CLINIC | Age: 38
End: 2023-02-16

## 2023-02-16 NOTE — TELEPHONE ENCOUNTER
Fax from [address] [place] [address] required for Tretinoin 0.1% cream     Placed fax in pa inbox

## 2023-02-21 ENCOUNTER — HOSPITAL ENCOUNTER (OUTPATIENT)
Dept: GENERAL RADIOLOGY | Facility: HOSPITAL | Age: 38
Discharge: HOME | End: 2023-02-21
Attending: INTERNAL MEDICINE
Payer: MEDICAID

## 2023-02-21 DIAGNOSIS — R76.12 POSITIVE QUANTIFERON-TB GOLD TEST: ICD-10-CM

## 2023-02-21 DIAGNOSIS — R76.11 HISTORY OF POSITIVE PPD, TREATMENT STATUS UNKNOWN: ICD-10-CM

## 2023-02-21 PROCEDURE — 71046 X-RAY EXAM CHEST 2 VIEWS: CPT | Performed by: INTERNAL MEDICINE

## 2023-02-21 NOTE — TELEPHONE ENCOUNTER
Medication PA Requested: Tretinoin 0.1% External Cream                                                    CoverMyMeds Used:  Key:  Quantity: 45 g  Day Supply:  Sig: Apply 1 application topically nightly. Use small amount to affected areas  DX Code:   L70.0 Acne Vulgaris                                  CPT code (if applicable):   Case Number/Pending Ref#:    Thank you!

## 2023-02-24 NOTE — TELEPHONE ENCOUNTER
Medication PA Requested: Tretinoin 0.1% External Cream                                                    CoverMyMeds Used:  Key:  Quantity: 45 g  Day Supply:  Sig: Apply 1 application topically nightly. Use small amount to affected areas  DX Code:   L70.0 Acne Vulgaris       Prime Epa submitted with last 2 OV, notation of t/f medication per chart review.   Awaiting determination

## 2023-02-27 NOTE — TELEPHONE ENCOUNTER
Dr. Sarita RODNEY denied due to pt's age. Pt's age 22 years or over must not be using the requested drug for acne. Ok to offer Goodrx or was this already discussed with pt? Thank you.

## 2023-03-29 ENCOUNTER — TELEPHONE (OUTPATIENT)
Dept: INTERNAL MEDICINE CLINIC | Facility: CLINIC | Age: 38
End: 2023-03-29

## 2023-03-29 RX ORDER — IBUPROFEN 200 MG
400 TABLET ORAL EVERY 6 HOURS PRN
COMMUNITY

## 2023-03-29 ASSESSMENT — ACTIVITIES OF DAILY LIVING (ADL)
ADL_BEFORE_ADMISSION: INDEPENDENT
ADL_SCORE: 12
SENSORY_SUPPORT_DEVICES: EYEGLASSES

## 2023-03-29 NOTE — TELEPHONE ENCOUNTER
[facility] called that Patient reported that she is not taking amlodipine since she says she doesn't need it and every picked up from pharmacy.  Wanted to let  know    amLODIPine 5 MG Oral Tab

## 2023-03-30 ENCOUNTER — ANESTHESIA EVENT (OUTPATIENT)
Dept: SURGERY | Age: 38
End: 2023-03-30

## 2023-03-30 ENCOUNTER — MED REC SCAN ONLY (OUTPATIENT)
Dept: INTERNAL MEDICINE CLINIC | Facility: CLINIC | Age: 38
End: 2023-03-30

## 2023-03-30 RX ORDER — AMLODIPINE BESYLATE 5 MG/1
5 TABLET ORAL DAILY
COMMUNITY

## 2023-03-30 NOTE — TELEPHONE ENCOUNTER
Spoke to patient (verified Name and ). She states she is going to have surgery on 3/31 for uterine polyp at [facility]. She was instructed to hold off on amlodipine for 2 weeks prior to surgery only. No further action needed.

## 2023-03-31 ENCOUNTER — HOSPITAL ENCOUNTER (OUTPATIENT)
Age: 38
Discharge: HOME | End: 2023-03-31
Attending: OBSTETRICS & GYNECOLOGY | Admitting: OBSTETRICS & GYNECOLOGY

## 2023-03-31 ENCOUNTER — ANESTHESIA (OUTPATIENT)
Dept: SURGERY | Age: 38
End: 2023-03-31

## 2023-03-31 LAB
B-HCG UR QL: NEGATIVE
INTERNAL PROCEDURAL CONTROLS ACCEPTABLE: YES
TEST LOT EXPIRATION DATE: NORMAL
TEST LOT NUMBER: NORMAL

## 2023-03-31 PROCEDURE — 10002801 HB RX 250 W/O HCPCS

## 2023-03-31 PROCEDURE — 10006023 HB SUPPLY 272: Performed by: OBSTETRICS & GYNECOLOGY

## 2023-03-31 PROCEDURE — 13000001 HB PHASE II RECOVERY EA 30 MINUTES: Performed by: OBSTETRICS & GYNECOLOGY

## 2023-03-31 PROCEDURE — 10002803 HB RX 637

## 2023-03-31 PROCEDURE — 13000037 HB COMPLEX CASE EACH ADD MINUTE: Performed by: OBSTETRICS & GYNECOLOGY

## 2023-03-31 PROCEDURE — 88305 TISSUE EXAM BY PATHOLOGIST: CPT | Performed by: OBSTETRICS & GYNECOLOGY

## 2023-03-31 PROCEDURE — 10002801 HB RX 250 W/O HCPCS: Performed by: OBSTETRICS & GYNECOLOGY

## 2023-03-31 PROCEDURE — 10002800 HB RX 250 W HCPCS

## 2023-03-31 PROCEDURE — 13000002 HB ANESTHESIA  GENERAL  S/U + 1ST 15 MIN: Performed by: OBSTETRICS & GYNECOLOGY

## 2023-03-31 PROCEDURE — 13000036 HB COMPLEX  CASE S/U + 1ST 15 MIN: Performed by: OBSTETRICS & GYNECOLOGY

## 2023-03-31 PROCEDURE — 10002807 HB RX 258: Performed by: OBSTETRICS & GYNECOLOGY

## 2023-03-31 PROCEDURE — 13000003 HB ANESTHESIA  GENERAL EA ADD MINUTE: Performed by: OBSTETRICS & GYNECOLOGY

## 2023-03-31 PROCEDURE — 10002803 HB RX 637: Performed by: OBSTETRICS & GYNECOLOGY

## 2023-03-31 PROCEDURE — 81025 URINE PREGNANCY TEST: CPT | Performed by: OBSTETRICS & GYNECOLOGY

## 2023-03-31 PROCEDURE — 10004451 HB PACU RECOVERY 1ST 30 MINUTES: Performed by: OBSTETRICS & GYNECOLOGY

## 2023-03-31 RX ORDER — PROPOFOL 10 MG/ML
INJECTION, EMULSION INTRAVENOUS PRN
Status: DISCONTINUED | OUTPATIENT
Start: 2023-03-31 | End: 2023-03-31

## 2023-03-31 RX ORDER — ONDANSETRON 2 MG/ML
INJECTION INTRAMUSCULAR; INTRAVENOUS PRN
Status: DISCONTINUED | OUTPATIENT
Start: 2023-03-31 | End: 2023-03-31

## 2023-03-31 RX ORDER — FAMOTIDINE 20 MG/1
20 TABLET, FILM COATED ORAL
Status: COMPLETED | OUTPATIENT
Start: 2023-03-31 | End: 2023-03-31

## 2023-03-31 RX ORDER — MIDAZOLAM HYDROCHLORIDE 1 MG/ML
INJECTION, SOLUTION INTRAMUSCULAR; INTRAVENOUS PRN
Status: DISCONTINUED | OUTPATIENT
Start: 2023-03-31 | End: 2023-03-31

## 2023-03-31 RX ORDER — SODIUM CHLORIDE, SODIUM LACTATE, POTASSIUM CHLORIDE, CALCIUM CHLORIDE 600; 310; 30; 20 MG/100ML; MG/100ML; MG/100ML; MG/100ML
INJECTION, SOLUTION INTRAVENOUS CONTINUOUS
Status: DISCONTINUED | OUTPATIENT
Start: 2023-03-31 | End: 2023-03-31 | Stop reason: HOSPADM

## 2023-03-31 RX ORDER — SCOLOPAMINE TRANSDERMAL SYSTEM 1 MG/1
1 PATCH, EXTENDED RELEASE TRANSDERMAL PRN
Status: DISCONTINUED | OUTPATIENT
Start: 2023-03-31 | End: 2023-03-31 | Stop reason: HOSPADM

## 2023-03-31 RX ORDER — IBUPROFEN 200 MG
600 TABLET ORAL EVERY 6 HOURS PRN
Status: DISCONTINUED | OUTPATIENT
Start: 2023-03-31 | End: 2023-03-31 | Stop reason: HOSPADM

## 2023-03-31 RX ORDER — LIDOCAINE HYDROCHLORIDE 20 MG/ML
INJECTION, SOLUTION INFILTRATION; PERINEURAL PRN
Status: DISCONTINUED | OUTPATIENT
Start: 2023-03-31 | End: 2023-03-31

## 2023-03-31 RX ORDER — DEXAMETHASONE SODIUM PHOSPHATE 4 MG/ML
INJECTION, SOLUTION INTRA-ARTICULAR; INTRALESIONAL; INTRAMUSCULAR; INTRAVENOUS; SOFT TISSUE PRN
Status: DISCONTINUED | OUTPATIENT
Start: 2023-03-31 | End: 2023-03-31

## 2023-03-31 RX ORDER — HYDRALAZINE HYDROCHLORIDE 20 MG/ML
5 INJECTION INTRAMUSCULAR; INTRAVENOUS EVERY 10 MIN PRN
Status: DISCONTINUED | OUTPATIENT
Start: 2023-03-31 | End: 2023-03-31 | Stop reason: HOSPADM

## 2023-03-31 RX ORDER — ONDANSETRON 2 MG/ML
4 INJECTION INTRAMUSCULAR; INTRAVENOUS
Status: DISCONTINUED | OUTPATIENT
Start: 2023-03-31 | End: 2023-03-31 | Stop reason: HOSPADM

## 2023-03-31 RX ORDER — MAGNESIUM HYDROXIDE 1200 MG/15ML
LIQUID ORAL PRN
Status: DISCONTINUED | OUTPATIENT
Start: 2023-03-31 | End: 2023-03-31 | Stop reason: HOSPADM

## 2023-03-31 RX ORDER — METOCLOPRAMIDE HYDROCHLORIDE 5 MG/ML
5 INJECTION INTRAMUSCULAR; INTRAVENOUS
Status: DISCONTINUED | OUTPATIENT
Start: 2023-03-31 | End: 2023-03-31 | Stop reason: HOSPADM

## 2023-03-31 RX ADMIN — SODIUM CHLORIDE, POTASSIUM CHLORIDE, SODIUM LACTATE AND CALCIUM CHLORIDE: 600; 310; 30; 20 INJECTION, SOLUTION INTRAVENOUS at 06:41

## 2023-03-31 RX ADMIN — FAMOTIDINE 20 MG: 20 TABLET, FILM COATED ORAL at 06:21

## 2023-03-31 RX ADMIN — SCOPALAMINE 1 PATCH: 1 PATCH, EXTENDED RELEASE TRANSDERMAL at 06:30

## 2023-03-31 RX ADMIN — DEXAMETHASONE SODIUM PHOSPHATE 4 MG: 4 INJECTION, SOLUTION INTRAMUSCULAR; INTRAVENOUS at 08:34

## 2023-03-31 RX ADMIN — MIDAZOLAM HYDROCHLORIDE 2 MG: 1 INJECTION, SOLUTION INTRAMUSCULAR; INTRAVENOUS at 08:24

## 2023-03-31 RX ADMIN — IBUPROFEN 600 MG: 200 TABLET, FILM COATED ORAL at 10:31

## 2023-03-31 RX ADMIN — LIDOCAINE HYDROCHLORIDE 2 ML: 20 INJECTION, SOLUTION INFILTRATION; PERINEURAL at 08:24

## 2023-03-31 RX ADMIN — PROPOFOL 150 MG: 10 INJECTION, EMULSION INTRAVENOUS at 08:24

## 2023-03-31 RX ADMIN — ONDANSETRON 4 MG: 2 INJECTION INTRAMUSCULAR; INTRAVENOUS at 08:34

## 2023-03-31 RX ADMIN — FENTANYL CITRATE 100 MCG: 50 INJECTION, SOLUTION INTRAMUSCULAR; INTRAVENOUS at 08:24

## 2023-03-31 ASSESSMENT — PAIN SCALES - GENERAL
PAINLEVEL_OUTOF10: 0
PAINLEVEL_OUTOF10: 0
PAINLEVEL_OUTOF10: 1
PAINLEVEL_OUTOF10: 0
PAINLEVEL_OUTOF10: 0

## 2023-04-01 VITALS
HEART RATE: 60 BPM | WEIGHT: 135.8 LBS | HEIGHT: 62 IN | BODY MASS INDEX: 24.99 KG/M2 | SYSTOLIC BLOOD PRESSURE: 130 MMHG | RESPIRATION RATE: 16 BRPM | OXYGEN SATURATION: 98 % | TEMPERATURE: 96.6 F | DIASTOLIC BLOOD PRESSURE: 86 MMHG

## 2023-04-03 LAB
ASR DISCLAIMER: NORMAL
CASE RPRT: NORMAL
CLINICAL INFO: NORMAL
PATH REPORT.FINAL DX SPEC: NORMAL
PATH REPORT.GROSS SPEC: NORMAL

## 2023-05-13 ENCOUNTER — HOSPITAL ENCOUNTER (OUTPATIENT)
Age: 38
Discharge: HOME | End: 2023-05-13
Payer: MEDICAID

## 2023-05-13 VITALS
DIASTOLIC BLOOD PRESSURE: 95 MMHG | RESPIRATION RATE: 18 BRPM | TEMPERATURE: 98 F | OXYGEN SATURATION: 99 % | SYSTOLIC BLOOD PRESSURE: 127 MMHG | HEART RATE: 80 BPM

## 2023-05-13 DIAGNOSIS — L08.9 INFECTED CYST OF SKIN: Primary | ICD-10-CM

## 2023-05-13 DIAGNOSIS — L72.9 INFECTED CYST OF SKIN: Primary | ICD-10-CM

## 2023-05-13 PROCEDURE — 99214 OFFICE O/P EST MOD 30 MIN: CPT

## 2023-05-13 PROCEDURE — 99213 OFFICE O/P EST LOW 20 MIN: CPT

## 2023-05-13 RX ORDER — CEPHALEXIN 500 MG/1
500 CAPSULE ORAL 3 TIMES DAILY
Qty: 21 CAPSULE | Refills: 0 | Status: SHIPPED | OUTPATIENT
Start: 2023-05-13 | End: 2023-05-20

## 2023-05-13 NOTE — DISCHARGE INSTRUCTIONS
Warm compresses to the scalp. Stop the Zithromax since it is making you sick and start the Keflex. Follow-up with dermatology. Return for any concerns.

## 2023-05-15 ENCOUNTER — OFFICE VISIT (OUTPATIENT)
Dept: DERMATOLOGY CLINIC | Facility: CLINIC | Age: 38
End: 2023-05-15

## 2023-05-15 DIAGNOSIS — L72.9 CYST OF SKIN: Primary | ICD-10-CM

## 2023-05-15 PROCEDURE — 99213 OFFICE O/P EST LOW 20 MIN: CPT | Performed by: PHYSICIAN ASSISTANT

## 2023-05-15 RX ORDER — SULFAMETHOXAZOLE AND TRIMETHOPRIM 800; 160 MG/1; MG/1
1 TABLET ORAL 2 TIMES DAILY
Qty: 14 TABLET | Refills: 0 | Status: SHIPPED | OUTPATIENT
Start: 2023-05-15

## 2023-07-03 NOTE — TELEPHONE ENCOUNTER
Refill Request for medication(s):     Last Office Visit: 5/15/23    Last Refill: 3/25/22    Pharmacy, Dosage verified: yes    Condition Update (if applicable):     Rx pended and sent to provider for approval, please advise. Thank You!

## 2023-07-04 RX ORDER — TRIAMCINOLONE ACETONIDE 1 MG/G
CREAM TOPICAL 2 TIMES DAILY
Qty: 80 G | Refills: 1 | Status: SHIPPED | OUTPATIENT
Start: 2023-07-04

## 2023-08-01 RX ORDER — KETOCONAZOLE 20 MG/ML
SHAMPOO TOPICAL
Qty: 120 ML | Refills: 5 | Status: SHIPPED | OUTPATIENT
Start: 2023-08-01

## 2023-08-15 RX ORDER — CLOBETASOL PROPIONATE 0.46 MG/ML
SOLUTION TOPICAL
Qty: 50 ML | Refills: 5 | Status: SHIPPED | OUTPATIENT
Start: 2023-08-15

## 2023-10-10 ENCOUNTER — OFFICE VISIT (OUTPATIENT)
Dept: INTERNAL MEDICINE CLINIC | Facility: CLINIC | Age: 38
End: 2023-10-10

## 2023-10-10 VITALS
BODY MASS INDEX: 25.52 KG/M2 | SYSTOLIC BLOOD PRESSURE: 100 MMHG | HEART RATE: 92 BPM | TEMPERATURE: 98 F | OXYGEN SATURATION: 99 % | DIASTOLIC BLOOD PRESSURE: 70 MMHG | WEIGHT: 144 LBS | HEIGHT: 63 IN

## 2023-10-10 DIAGNOSIS — H66.001 NON-RECURRENT ACUTE SUPPURATIVE OTITIS MEDIA OF RIGHT EAR WITHOUT SPONTANEOUS RUPTURE OF TYMPANIC MEMBRANE: Primary | ICD-10-CM

## 2023-10-10 PROBLEM — H92.01 RIGHT EAR PAIN: Status: ACTIVE | Noted: 2023-10-10

## 2023-10-10 PROCEDURE — 3074F SYST BP LT 130 MM HG: CPT | Performed by: NURSE PRACTITIONER

## 2023-10-10 PROCEDURE — 3078F DIAST BP <80 MM HG: CPT | Performed by: NURSE PRACTITIONER

## 2023-10-10 PROCEDURE — 3008F BODY MASS INDEX DOCD: CPT | Performed by: NURSE PRACTITIONER

## 2023-10-10 PROCEDURE — 99213 OFFICE O/P EST LOW 20 MIN: CPT | Performed by: NURSE PRACTITIONER

## 2023-10-10 RX ORDER — AMOXICILLIN AND CLAVULANATE POTASSIUM 875; 125 MG/1; MG/1
1 TABLET, FILM COATED ORAL 2 TIMES DAILY
Qty: 20 TABLET | Refills: 0 | Status: SHIPPED | OUTPATIENT
Start: 2023-10-10 | End: 2023-10-20

## 2023-10-10 NOTE — ASSESSMENT & PLAN NOTE
Acute otitis media of right ear. Right  ear with bulging, opaque, erythematous, and a yellowish hue. Plan  amoxicillin clavulanate 875-125 MG Oral Tab Take 1 tablet by mouth 2 (two) times daily for 10 days.  20 tablet   Apply warm compresses to infected ear

## 2023-10-11 ENCOUNTER — HOSPITAL ENCOUNTER (OUTPATIENT)
Age: 38
Discharge: HOME | End: 2023-10-11
Attending: EMERGENCY MEDICINE
Payer: MEDICAID

## 2023-10-11 ENCOUNTER — TELEPHONE (OUTPATIENT)
Dept: INTERNAL MEDICINE CLINIC | Facility: CLINIC | Age: 38
End: 2023-10-11

## 2023-10-11 VITALS
SYSTOLIC BLOOD PRESSURE: 126 MMHG | OXYGEN SATURATION: 97 % | HEART RATE: 83 BPM | RESPIRATION RATE: 16 BRPM | DIASTOLIC BLOOD PRESSURE: 94 MMHG | TEMPERATURE: 98 F

## 2023-10-11 DIAGNOSIS — H60.551 ACUTE REACTIVE OTITIS EXTERNA OF RIGHT EAR: Primary | ICD-10-CM

## 2023-10-11 PROCEDURE — 99213 OFFICE O/P EST LOW 20 MIN: CPT

## 2023-10-11 NOTE — TELEPHONE ENCOUNTER
Condition update:  Patient was seen yesterday by Pito SHAH. Did try heating pad to R ear but still itchy. Given Augmentin for acute otitis media of right ear. It is making her stomach growl and hurt, even if she takes with food. Also gave her 4 loose stools last evening, one loose stool this morning. Asking if she must keep taking? She states in past was given Floxin ear drops in 2021 and it helped. Asking if she can get prescription because ear is still very itchy. She stopped the [facility] ear drops she had been using, asking if she can restart? Pito SHAH, please advise?

## 2023-10-11 NOTE — ED INITIAL ASSESSMENT (HPI)
Patient arrives ambulatory with c/o 3 days of right ear itchiness/pain. Reports swimming in a pool a couple days before itchiness. Reports going to PCP and being prescribed amoxicillin-first dose was last night. Reports calling doctor d/t having diarrhea form the amox and her PCP told her to stop abx and go to IC. Reports continued ear itching.

## 2023-10-12 NOTE — TELEPHONE ENCOUNTER
Disp Refills Start End    neomycin-polymyxin-hydrocortisone 3.5-04734-3 Otic Solution 6 mL 0 10/11/2023 10/18/2023    Sig - Route: Place 3 drops into the right ear 4 (four) times daily for 7 days. - Right Ear    Sent to pharmacy as: Neomycin-Polymyxin-HC 3.5-08240-2 Otic Solution (Cortisporin)    E-Prescribing Status: Receipt confirmed by pharmacy (10/11/2023 12:13 PM CDT)      [address] [place] [address] [address], [place] [address], [phone], [phone]

## 2023-12-13 ENCOUNTER — OFFICE VISIT (OUTPATIENT)
Dept: DERMATOLOGY CLINIC | Facility: CLINIC | Age: 38
End: 2023-12-13

## 2023-12-13 DIAGNOSIS — B07.8 VERRUCA PLANA: ICD-10-CM

## 2023-12-13 DIAGNOSIS — L72.9 CYST OF SKIN: ICD-10-CM

## 2023-12-13 DIAGNOSIS — L30.9 DERMATITIS: ICD-10-CM

## 2023-12-13 DIAGNOSIS — D23.9 BENIGN NEOPLASM OF SKIN, UNSPECIFIED LOCATION: ICD-10-CM

## 2023-12-13 DIAGNOSIS — L70.0 ACNE VULGARIS: Primary | ICD-10-CM

## 2023-12-13 PROCEDURE — 99214 OFFICE O/P EST MOD 30 MIN: CPT | Performed by: DERMATOLOGY

## 2023-12-13 RX ORDER — TRETINOIN 1 MG/G
1 CREAM TOPICAL NIGHTLY
Qty: 45 G | Refills: 3 | Status: SHIPPED | OUTPATIENT
Start: 2023-12-13 | End: 2024-01-12

## 2023-12-13 RX ORDER — CLOBETASOL PROPIONATE 0.46 MG/ML
SOLUTION TOPICAL
Qty: 50 ML | Refills: 5 | Status: SHIPPED | OUTPATIENT
Start: 2023-12-13

## 2023-12-13 RX ORDER — KETOCONAZOLE 20 MG/ML
SHAMPOO TOPICAL
Qty: 120 ML | Refills: 5 | Status: SHIPPED | OUTPATIENT
Start: 2023-12-13

## 2023-12-13 RX ORDER — TRIAMCINOLONE ACETONIDE 1 MG/G
CREAM TOPICAL 2 TIMES DAILY
Qty: 80 G | Refills: 1 | Status: SHIPPED | OUTPATIENT
Start: 2023-12-13

## 2023-12-13 RX ORDER — RELUGOLIX, ESTRADIOL HEMIHYDRATE, AND NORETHINDRONE ACETATE 40; 1; .5 MG/1; MG/1; MG/1
1 TABLET, FILM COATED ORAL DAILY
COMMUNITY
Start: 2023-10-27 | End: 2023-12-20 | Stop reason: ALTCHOICE

## 2023-12-13 RX ORDER — ADAPALENE AND BENZOYL PEROXIDE GEL, 0.1%/2.5% 1; 25 MG/G; MG/G
GEL TOPICAL
Qty: 45 G | Refills: 0 | Status: SHIPPED | OUTPATIENT
Start: 2023-12-13

## 2023-12-13 RX ORDER — SALICYLIC ACID 60 MG/G
1 GEL TOPICAL EVERY EVENING
Qty: 40 G | Refills: 3 | Status: SHIPPED | OUTPATIENT
Start: 2023-12-13

## 2023-12-14 ENCOUNTER — TELEPHONE (OUTPATIENT)
Dept: DERMATOLOGY CLINIC | Facility: CLINIC | Age: 38
End: 2023-12-14

## 2023-12-14 NOTE — TELEPHONE ENCOUNTER
Adapalene-Benzoyl Peroxide (EPIDUO) 0.1-2.5 % External Gel, Use bid to affected areas of face, chest, back as directed, Disp: 45 g, Rfl: 0      Key: [id number]  Patients Last name: Palak Mcclelland

## 2023-12-14 NOTE — TELEPHONE ENCOUNTER
Tretinoin 0.1 % External Cream, Apply 1 Application topically nightly.  Use small amount to affected areas, Disp: 45 g, Rfl: 3      Key:[id number]  Patients Last name:Norbert  : 1985

## 2023-12-20 ENCOUNTER — OFFICE VISIT (OUTPATIENT)
Dept: INTERNAL MEDICINE CLINIC | Facility: CLINIC | Age: 38
End: 2023-12-20

## 2023-12-20 ENCOUNTER — APPOINTMENT (OUTPATIENT)
Dept: CT IMAGING | Age: 38
End: 2023-12-20
Attending: EMERGENCY MEDICINE
Payer: MEDICAID

## 2023-12-20 ENCOUNTER — HOSPITAL ENCOUNTER (OUTPATIENT)
Age: 38
Discharge: HOME | End: 2023-12-20
Attending: EMERGENCY MEDICINE
Payer: MEDICAID

## 2023-12-20 VITALS
WEIGHT: 148.63 LBS | SYSTOLIC BLOOD PRESSURE: 122 MMHG | DIASTOLIC BLOOD PRESSURE: 85 MMHG | HEART RATE: 82 BPM | HEIGHT: 63 IN | BODY MASS INDEX: 26.34 KG/M2

## 2023-12-20 VITALS
TEMPERATURE: 98 F | RESPIRATION RATE: 16 BRPM | OXYGEN SATURATION: 98 % | HEART RATE: 75 BPM | DIASTOLIC BLOOD PRESSURE: 91 MMHG | SYSTOLIC BLOOD PRESSURE: 124 MMHG

## 2023-12-20 DIAGNOSIS — M54.2 CERVICALGIA: Primary | ICD-10-CM

## 2023-12-20 DIAGNOSIS — M54.2 NECK PAIN ON LEFT SIDE: Primary | ICD-10-CM

## 2023-12-20 LAB
#MXD IC: 0.4 X10ˆ3/UL (ref 0.1–1)
B-HCG UR QL: NEGATIVE
BUN BLD-MCNC: 17 MG/DL (ref 7–18)
CHLORIDE BLD-SCNC: 103 MMOL/L (ref 98–112)
CO2 BLD-SCNC: 26 MMOL/L (ref 21–32)
CREAT BLD-MCNC: 0.8 MG/DL
EGFRCR SERPLBLD CKD-EPI 2021: 97 ML/MIN/1.73M2 (ref 60–?)
GLUCOSE BLD-MCNC: 99 MG/DL (ref 70–99)
HCT VFR BLD AUTO: 45 %
HCT VFR BLD CALC: 48 %
HGB BLD-MCNC: 15 G/DL
ISTAT IONIZED CALCIUM FOR CHEM 8: 1.01 MMOL/L (ref 1.12–1.32)
LYMPHOCYTES # BLD AUTO: 2.4 X10ˆ3/UL (ref 1–4)
LYMPHOCYTES NFR BLD AUTO: 41.8 %
MCH RBC QN AUTO: 30.9 PG (ref 26–34)
MCHC RBC AUTO-ENTMCNC: 33.3 G/DL (ref 31–37)
MCV RBC AUTO: 92.8 FL (ref 80–100)
MIXED CELL %: 6.5 %
NEUTROPHILS # BLD AUTO: 3 X10ˆ3/UL (ref 1.5–7.7)
NEUTROPHILS NFR BLD AUTO: 51.7 %
PLATELET # BLD AUTO: 287 X10ˆ3/UL (ref 150–450)
POCT MONO: NEGATIVE
POTASSIUM BLD-SCNC: 3.8 MMOL/L (ref 3.6–5.1)
RBC # BLD AUTO: 4.85 X10ˆ6/UL
S PYO AG THROAT QL IA.RAPID: NEGATIVE
SODIUM BLD-SCNC: 140 MMOL/L (ref 136–145)
WBC # BLD AUTO: 5.8 X10ˆ3/UL (ref 4–11)

## 2023-12-20 PROCEDURE — 36415 COLL VENOUS BLD VENIPUNCTURE: CPT

## 2023-12-20 PROCEDURE — 86308 HETEROPHILE ANTIBODY SCREEN: CPT | Performed by: EMERGENCY MEDICINE

## 2023-12-20 PROCEDURE — 3074F SYST BP LT 130 MM HG: CPT | Performed by: INTERNAL MEDICINE

## 2023-12-20 PROCEDURE — 99214 OFFICE O/P EST MOD 30 MIN: CPT

## 2023-12-20 PROCEDURE — 81025 URINE PREGNANCY TEST: CPT

## 2023-12-20 PROCEDURE — 99213 OFFICE O/P EST LOW 20 MIN: CPT | Performed by: INTERNAL MEDICINE

## 2023-12-20 PROCEDURE — 70491 CT SOFT TISSUE NECK W/DYE: CPT | Performed by: EMERGENCY MEDICINE

## 2023-12-20 PROCEDURE — 85025 COMPLETE CBC W/AUTO DIFF WBC: CPT | Performed by: EMERGENCY MEDICINE

## 2023-12-20 PROCEDURE — 3008F BODY MASS INDEX DOCD: CPT | Performed by: INTERNAL MEDICINE

## 2023-12-20 PROCEDURE — 80047 BASIC METABLC PNL IONIZED CA: CPT

## 2023-12-20 PROCEDURE — 87651 STREP A DNA AMP PROBE: CPT | Performed by: EMERGENCY MEDICINE

## 2023-12-20 PROCEDURE — 3079F DIAST BP 80-89 MM HG: CPT | Performed by: INTERNAL MEDICINE

## 2023-12-20 RX ORDER — CYCLOBENZAPRINE HCL 10 MG
10 TABLET ORAL 3 TIMES DAILY PRN
Qty: 30 TABLET | Refills: 0 | Status: SHIPPED | OUTPATIENT
Start: 2023-12-20 | End: 2024-01-09

## 2023-12-20 NOTE — DISCHARGE INSTRUCTIONS
Thank you for visiting our immediate care for your health care needs. Please follow up with your regular doctor in the next 1-2 days. If you have any additional problems please return to the immediate care. Please take Tylenol and or Motrin for pain. Please take medication as previously prescribed by your doctor.

## 2023-12-20 NOTE — ED INITIAL ASSESSMENT (HPI)
Pt presents with left jaw/upper left neck pain x 3 days. Pt reports painful to chew on left side. Pt reports sore throat one week ago. Pt denies dental pain or \"issues with teeth\". Pt took Motrin 400mg at [address] [place] [address] today.

## 2023-12-22 ENCOUNTER — TELEPHONE (OUTPATIENT)
Dept: INTERNAL MEDICINE CLINIC | Facility: CLINIC | Age: 38
End: 2023-12-22

## 2023-12-22 NOTE — TELEPHONE ENCOUNTER
Patient called, verified Name and . States she was seen in the IC and ED for neck pain. She was told that CT soft tissue of neck was fine. However, she remains to have gum pain where her molar is to a point where she cannot chew or bite, area is also tender to touch. She was advised to take muscle relaxant and ibuprofen from office visit prior to [facility] and ED visit. She does not think it is muscle related so has not been taking the muscle relaxant. Advised to schedule an appointment with dentist to get so she can get an oral x-ray. She will call back if follow-up appointment with PCP is needed. Patient verbalized understanding and agreed with plan of care.

## 2023-12-26 NOTE — PROGRESS NOTES
Flavio Chavez is a 45year old female. Chief Complaint   Patient presents with    Derm Problem     LOV 5/15/23; Pt presenting with c/o bump on left cheek near eye. Also, she requests prescription refills and/or new prescriptions -  Requested prescriptions include one she had in past which contained salicylic acid, for use to warts on face, and tretinoin. Refills requested are: clobetasol propionate 0.05% external solution, ketoconazole shampoo, and triamcinolone             Minocycline, Pollen, Wheat gluten, and Amoxicillin  Current Outpatient Medications   Medication Sig Dispense Refill    clobetasol 0.05 % External Solution APPLY TO SCALP TWICE DAILY AS DIRECTED 50 mL 5    ketoconazole 2 % External Shampoo APPLY TO SCALP TWICE WEEKLY 120 mL 5    triamcinolone 0.1 % External Cream Apply topically 2 (two) times daily. 80 g 1    Adapalene-Benzoyl Peroxide (EPIDUO) 0.1-2.5 % External Gel Use bid to affected areas of face, chest, back as directed 45 g 0    Tretinoin 0.1 % External Cream Apply 1 Application topically nightly. Use small amount to affected areas 45 g 3    Salicylic Acid (KERALYT) 6 % External Gel Apply 1 Application topically every evening. apply by topical route  every day as directed to warts 40 g 3    amLODIPine 5 MG Oral Tab Take 1 tablet (5 mg total) by mouth daily. 90 tablet 3    Ascorbic Acid (VITAMIN C) 100 MG Oral Tab Take by mouth. MULTIPLE VITAMINS-MINERALS ER OR Take by mouth. Cholecalciferol (VITAMIN D) 125 MCG (5000 UT) Oral Cap Take by mouth. cyclobenzaprine 10 MG Oral Tab Take 1 tablet (10 mg total) by mouth 3 (three) times daily as needed for Muscle spasms. 30 tablet 0    acyclovir 400 MG Oral Tab Take 1 tablet (400 mg total) by mouth 3 (three) times daily.  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 Sexual activity: Yes     Partners: Male   Other Topics Concern    Caffeine Concern No    Exercise No    Pt has a pacemaker No    Pt has a defibrillator No    Breast feeding No    Reaction to local anesthetic No   Social History Narrative    The patient does not use an assistive device. .      The patient does live in a home with stairs. Current Outpatient Medications   Medication Sig Dispense Refill    clobetasol 0.05 % External Solution APPLY TO SCALP TWICE DAILY AS DIRECTED 50 mL 5    ketoconazole 2 % External Shampoo APPLY TO SCALP TWICE WEEKLY 120 mL 5    triamcinolone 0.1 % External Cream Apply topically 2 (two) times daily. 80 g 1    Adapalene-Benzoyl Peroxide (EPIDUO) 0.1-2.5 % External Gel Use bid to affected areas of face, chest, back as directed 45 g 0    Tretinoin 0.1 % External Cream Apply 1 Application topically nightly. Use small amount to affected areas 45 g 3    Salicylic Acid (KERALYT) 6 % External Gel Apply 1 Application topically every evening. apply by topical route  every day as directed to warts 40 g 3    amLODIPine 5 MG Oral Tab Take 1 tablet (5 mg total) by mouth daily. 90 tablet 3    Ascorbic Acid (VITAMIN C) 100 MG Oral Tab Take by mouth.       MULTIPLE VITAMINS-MINERALS ER OR Take by mouth. Cholecalciferol (VITAMIN D) 125 MCG (5000 UT) Oral Cap Take by mouth. cyclobenzaprine 10 MG Oral Tab Take 1 tablet (10 mg total) by mouth 3 (three) times daily as needed for Muscle spasms. 30 tablet 0    acyclovir 400 MG Oral Tab Take 1 tablet (400 mg total) by mouth 3 (three) times daily. 15 tablet 0    acyclovir 5 % External Ointment Please apply ointment on affected area  every 3 hours while awake for 5 days 1 each 1     Allergies: Allergies   Allergen Reactions    Minocycline      Other reaction(s): Rash    Pollen     Wheat Gluten     Amoxicillin DIARRHEA       Past Medical History:   Diagnosis Date    Acne     Bipolar II disorder ([facility] [address]     Breast mass, left 2014    Constipation 10/29/2015    Depression     Fibroadenoma of breast, left     Migraines     Pt denies hx of migraines 4/3/15    Positive TB test     INH    Postconcussion syndrome 2014    PPD positive 2016    Sexually transmitted disease     Trich     Trichomonal vaginitis 2016     Past Surgical History:   Procedure Laterality Date    BREAST BIOPSY Left 2014      2007    NEEDLE BIOPSY LEFT  2014     Social History     Socioeconomic History    Marital status:      Spouse name: Not on file    Number of children: Not on file    Years of education: Not on file    Highest education level: Not on file   Occupational History    Not on file   Tobacco Use    Smoking status: Never     Passive exposure: Never    Smokeless tobacco: Never   Vaping Use    Vaping Use: Never used   Substance and Sexual Activity    Alcohol use: No     Alcohol/week: 0.0 standard drinks of alcohol    Drug use: No     Comment: None.      Sexual activity: Yes     Partners: Male   Other Topics Concern     Service Not Asked    Blood Transfusions Not Asked    Caffeine Concern No    Occupational Exposure Not Asked    Hobby Hazards Not Asked    Sleep Concern Not Asked    Stress Concern Not Asked    Weight Concern Not Asked    Special Diet Not Asked    Back Care Not Asked    Exercise No    Bike Helmet Not Asked    Seat Belt Not Asked    Self-Exams Not Asked    Grew up on a farm Not Asked    History of tanning Not Asked    Outdoor occupation Not Asked    Pt has a pacemaker No    Pt has a defibrillator No    Breast feeding No    Reaction to local anesthetic No   Social History Narrative    The patient does not use an assistive device. .      The patient does live in a home with stairs. Social Determinants of Health     Financial Resource Strain: Not on file   Food Insecurity: Not on file   Transportation Needs: Not on file   Physical Activity: Not on file   Stress: Not on file   Social Connections: Not on file   Housing Stability: Not on file     Family History   Problem Relation Age of Onset    Infectious Disease Father         tuberculosis    Asthma Father                       HPI :      Chief Complaint   Patient presents with    Derm Problem     LOV 5/15/23; Pt presenting with c/o bump on left cheek near eye. Also, she requests prescription refills and/or new prescriptions -  Requested prescriptions include one she had in past which contained salicylic acid, for use to warts on face, and tretinoin. Refills requested are: clobetasol propionate 0.05% external solution, ketoconazole shampoo, and triamcinolone     Follow-up lesion on face concerned near her eye acne, hair loss. Concerned with lesion on occipital scalp has been present seemed more inflamed recently. Patient concerned has planned surgery GYN for uterine polyp. DHEA-S and testosterone normal previously. Epiduo helpful previously. Using ketoconazole shampoo. Requests new topicals    Patient presents with concerns above. Past notes/ records and appropriate/relevant lab results including pathology and past body maps reviewed. Updated and new information noted in current visit. ROS:    Denies any other systemic complaints.   No fevers, chills, night sweats, sensitivity to the sun, deeper lumps or bumps. No other skin complaints. History, medications, allergies as noted. Physical examination: Patient  well-developed well-nourished, alert oriented in no acute distress. Exam of involved, appropriate areas of skin performed, including scalp, head, neck, face,nails, hair, external eyes, including conjunctival mucosa, eyelids, lips, external ears, back, chest, abdomen, arms, legs, palms. Remarkable for lesions as noted   Decreased hair density diffusely. Minimal scale. Acne inflammatory nodules, papules postinflammatory erythema hyperpigmentation atrophic changes noted over the cheeks. Lesion of concern cystic nodule occipital scalp  Verbal okay flat warts on face, arms hands    Acne nodules grade 3     ASSESSMENT AND PLAN:     Encounter Diagnoses   Name Primary? Acne vulgaris Yes    Cyst of skin     Benign neoplasm of skin, unspecified location     Verruca plana     Dermatitis        Assessment / plan:  Acne. See medications in grid. Skin care regimen discussed at length including cleansers, makeup, face washing, sunscreen. Recheck in 6 -8 weeks if no improvement. Notify us promptly if problems tolerating regimen. Consider more aggressive therapy if not responding. Scarring, atrophic changes,   May use Epiduo  Tretinoin may be more helpful for postinflammatory changes patient notes this has been helpful with consistent use helping to improve old changes as well as prevent new acne smaller inflammatory papules currently. Reviewed applications directions. Discussed other options for more nodular lesions. Overall has been doing better with consistent use of medications    Consider intermittent course, dosing of Bactrim    Inflamed pilar cyst has resolved post Bactrim and appears improved  Reassurance regarding benign nature of lesion otherwise has not been problematic.   Consider removal in future if necessary    Hair loss slowly progressive  Labs have been normal.  No significant improvement in the past with topicals reviewed labs  Continue supportive care. Slowly progressive. Consider finasteride in future  Continue monitoring. Continue labs    Flat warts  Keralyt Gel has been very helpful in the past will resume. Discussed options      Pathophysiology discussed with patient. Therapeutic options reviewed. See  Medications in grid. Instructions reviewed at length. General skin care questions answered. Reassurance regarding benign skin lesions. Signs and symptoms of skin cancer, ABCDE's of melanoma briefly reviewed. Sunscreen use, sun protection, encouraged. Followup as noted in rtc or p.r.n. Encounter Times  Including precharting, reviewing chart, prior notes obtaining history: 5 minutes, medical exam :10 minutes, notes on body map, plan, counseling 10minutes My total time spent caring for the patient on the day of the encounter: 25 minutes       The patient indicates understanding of these issues and agrees to the plan. The patient is asked to return as noted in follow-up /as noted above    This note was generated using Dragon voice recognition software. Please contact me regarding any confusion resulting from errors in recognition. No orders of the defined types were placed in this encounter. Meds & Refills for this Visit:   Requested Prescriptions     Signed Prescriptions Disp Refills    clobetasol 0.05 % External Solution 50 mL 5     Sig: APPLY TO SCALP TWICE DAILY AS DIRECTED    ketoconazole 2 % External Shampoo 120 mL 5     Sig: APPLY TO SCALP TWICE WEEKLY    triamcinolone 0.1 % External Cream 80 g 1     Sig: Apply topically 2 (two) times daily. Adapalene-Benzoyl Peroxide (EPIDUO) 0.1-2.5 % External Gel 45 g 0     Sig: Use bid to affected areas of face, chest, back as directed    Tretinoin 0.1 % External Cream 45 g 3     Sig: Apply 1 Application topically nightly.  Use small amount to affected areas Salicylic Acid (KERALYT) 6 % External Gel 40 g 3     Sig: Apply 1 Application topically every evening. apply by topical route  every day as directed to warts       Acne vulgaris  (primary encounter diagnosis)  Alopecia  Dermatitis    No orders of the defined types were placed in this encounter. Results From Past 48 Hours:  No results found for this or any previous visit (from the past 48 hour(s)). Meds This Visit:      Imaging Orders:  None     Referral Orders:  No orders of the defined types were placed in this encounter.

## 2024-01-22 ENCOUNTER — NURSE TRIAGE (OUTPATIENT)
Dept: INTERNAL MEDICINE CLINIC | Facility: CLINIC | Age: 39
End: 2024-01-22

## 2024-01-22 RX ORDER — ACYCLOVIR 50 MG/G
OINTMENT TOPICAL
Qty: 1 EACH | Refills: 1 | Status: CANCELLED
Start: 2024-01-22

## 2024-01-22 RX ORDER — ACYCLOVIR 400 MG/1
400 TABLET ORAL 3 TIMES DAILY
Qty: 15 TABLET | Refills: 0 | Status: CANCELLED | OUTPATIENT
Start: 2024-01-22

## 2024-01-22 RX ORDER — ACYCLOVIR 400 MG/1
400 TABLET ORAL 3 TIMES DAILY
Qty: 15 TABLET | Refills: 0 | Status: SHIPPED | OUTPATIENT
Start: 2024-01-22

## 2024-01-22 NOTE — TELEPHONE ENCOUNTER
Action Requested: Summary for Provider     []  Critical Lab, Recommendations Needed  [x] Need Additional Advice  []   FYI    [x]   Need Orders  [] Need Medications Sent to Pharmacy  []  Other     SUMMARY: Per Protocol disposition advised;  Patient c/o cold sore outbreak around her mouth. Patient c/o itching with blister like appearance. Patient is asking for a refill for the acyclovir. Patient is asking if she should do the oral tablet, or ointment or both?    Order pended for approval/review.     Reason for call: Mouth/Lip Problem  Onset: today      Patient (name and  verified) c/o cold sore outbreak around her mouth. Patient states that she has an outbreak every 3 to 4 months.  Reason for Disposition   Herpes sores are a recurrent problem, and caller wants a prescription medicine to take the next time they occur    Protocols used: Cold Sores (Fever Blisters)-A-OH

## 2024-01-22 NOTE — TELEPHONE ENCOUNTER
Left message; I will send detailed mychart message. (No personalized VM so I did not leave details on VM)

## 2024-01-22 NOTE — TELEPHONE ENCOUNTER
Acyclovir tablets sent to the pharmacy   400 mg tid  for 5 days     No need for acyclovir cream and  is Not covered by insurance .  No need for  2 treatments     Please advice -Pt canot be  pregnant   taking medication .

## 2024-02-29 ENCOUNTER — OFFICE VISIT (OUTPATIENT)
Dept: OBGYN CLINIC | Facility: CLINIC | Age: 39
End: 2024-02-29

## 2024-02-29 VITALS
SYSTOLIC BLOOD PRESSURE: 127 MMHG | BODY MASS INDEX: 26 KG/M2 | DIASTOLIC BLOOD PRESSURE: 89 MMHG | HEART RATE: 93 BPM | WEIGHT: 149 LBS

## 2024-02-29 DIAGNOSIS — N92.6 IRREGULAR PERIODS: ICD-10-CM

## 2024-02-29 DIAGNOSIS — L72.3 SEBACEOUS CYST: ICD-10-CM

## 2024-02-29 DIAGNOSIS — R10.31 PAIN, ABDOMINAL, RLQ: Primary | ICD-10-CM

## 2024-02-29 LAB
CONTROL LINE PRESENT WITH A CLEAR BACKGROUND (YES/NO): YES YES/NO
KIT LOT #: NORMAL NUMERIC

## 2024-02-29 PROCEDURE — 99214 OFFICE O/P EST MOD 30 MIN: CPT | Performed by: NURSE PRACTITIONER

## 2024-02-29 PROCEDURE — 81025 URINE PREGNANCY TEST: CPT | Performed by: NURSE PRACTITIONER

## 2024-02-29 NOTE — PROGRESS NOTES
[facility]   Obstetrics and Gynecology    Alisa Toussaint is a 38 year old female  Patient's last menstrual period was 01/15/2024 (approximate).   Chief Complaint   Patient presents with    Gyn Problem     New pt, pt states she's been having lower abdominal pain. Pt states she's also been having vaginal discharge for about a month as well.    .patient c/o RLQ pain that comes and goes over the last month. Ibuprofen helps, exercises makes pain worse, pain can be like a 7-8/10, lasts an hour, heat helps. No nausea or vomiting, no fever or chills.  Some constipation. Right now pain is mild.    Also notices vaginal warts? In vulvar area. Noticed a month ago. She tried hydrocortisone cream and acyclovir cream. Not painful. Tried to pop it.    She is sexually active with her boyfriend. Desires sti testing. Not using contraception. States irregular periods every 2 months. UPT today negative    Hx of HSV cold sores    Pap:2019 NILM, neg HPv  Contraception: none    OBSTETRICS HISTORY:  OB History    Para Term  AB Living   1 1 1 0 0 1   SAB IAB Ectopic Multiple Live Births   0 0 0 0 1       GYNE HISTORY:  Menarche: 15  (2024  2:39 PM)  Period Cycle (Days): irregular  (2024  2:39 PM)  Period Duration (Days): 7-10 days (2024  2:39 PM)  Period Flow: 4 days heavy with clots (2024  2:39 PM)  Use of Birth Control (if yes, specify type): None (2024  2:39 PM)  Pap Date: 19 (2024  2:39 PM)  Pap Result Notes: PAP/HPV NEG (2024  2:39 PM)  Follow Up Recommendation: MAMMO 19 BETTIE BENIGN (2024  2:39 PM)      History   Sexual Activity    Sexual activity: Yes    Partners: Male    Birth control/ protection:        MEDICAL HISTORY:  Past Medical History:   Diagnosis Date    Acne     Bipolar II disorder ([facility])     Breast mass, left 2014    Constipation 10/29/2015    Depression     Fibroadenoma of breast, left     Migraines     Pt denies hx of migraines 4/3/15     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    INH    Postconcussion syndrome 12/12/2014    PPD positive 11/30/2016    Sexually transmitted disease     Trich 5/16    Trichomonal vaginitis 6/6/2016       SOCIAL HISTORY:  Social History     Socioeconomic History    Marital status:      Spouse name: Not on file    Number of children: Not on file    Years of education: Not on file    Highest education level: Not on file   Occupational History    Not on file   Tobacco Use    Smoking status: Never     Passive exposure: Never    Smokeless tobacco: Never   Vaping Use    Vaping Use: Never used   Substance and Sexual Activity    Alcohol use: No     Alcohol/week: 0.0 standard drinks of alcohol    Drug use: No     Comment: None.     Sexual activity: Yes     Partners: Male   Other Topics Concern     Service Not Asked    Blood Transfusions Not Asked    Caffeine Concern No    Occupational Exposure Not Asked    Hobby Hazards Not Asked    Sleep Concern Not Asked    Stress Concern Not Asked    Weight Concern Not Asked    Special Diet Not Asked    Back Care Not Asked    Exercise No    Bike Helmet Not Asked    Seat Belt Not Asked    Self-Exams Not Asked    Grew up on a farm Not Asked    History of tanning Not Asked    Outdoor occupation Not Asked    Pt has a pacemaker No    Pt has a defibrillator No    Breast feeding No    Reaction to local anesthetic No   Social History Narrative    The patient does not use an assistive device..      The patient does live in a home with stairs.     Social Determinants of Health     Financial Resource Strain: Not on file   Food Insecurity: Not on file   Transportation Needs: Not on file   Physical Activity: Not on file   Stress: Not on file   Social Connections: Not on file   Housing Stability: Not on file       MEDICATIONS:    Current Outpatient Medications:     acyclovir 400 MG Oral Tab, Take 1 tablet (400 mg total) by mouth 3 (three) times daily. for 5 days, Disp: 15 tablet, Rfl: 0    clobetasol 0.05 %  External Solution, APPLY TO SCALP TWICE DAILY AS DIRECTED, Disp: 50 mL, Rfl: 5    ketoconazole 2 % External Shampoo, APPLY TO SCALP TWICE WEEKLY, Disp: 120 mL, Rfl: 5    triamcinolone 0.1 % External Cream, Apply topically 2 (two) times daily., Disp: 80 g, Rfl: 1    Adapalene-Benzoyl Peroxide (EPIDUO) 0.1-2.5 % External Gel, Use bid to affected areas of face, chest, back as directed, Disp: 45 g, Rfl: 0    Salicylic Acid (KERALYT) 6 % External Gel, Apply 1 Application topically every evening. apply by topical route  every day as directed to warts, Disp: 40 g, Rfl: 3    amLODIPine 5 MG Oral Tab, Take 1 tablet (5 mg total) by mouth daily., Disp: 90 tablet, Rfl: 3    acyclovir 5 % External Ointment, Please apply ointment on affected area  every 3 hours while awake for 5 days, Disp: 1 each, Rfl: 1    Ascorbic Acid (VITAMIN C) 100 MG Oral Tab, Take by mouth., Disp: , Rfl:     MULTIPLE VITAMINS-MINERALS ER OR, Take by mouth., Disp: , Rfl:     Cholecalciferol (VITAMIN D) 125 MCG (5000 UT) Oral Cap, Take by mouth., Disp: , Rfl:     ALLERGIES:    Allergies   Allergen Reactions    Minocycline      Other reaction(s): Rash    Pollen     Wheat Gluten     Amoxicillin DIARRHEA         Review of Systems:  Constitutional:  Denies fatigue, night sweats, hot flashes  Cardiovascular:  denies chest pain or palpitations  Respiratory:  denies shortness of breath  Gastrointestinal:  denies heartburn, abdominal pain, diarrhea or constipation  Genitourinary:  denies dysuria, incontinence, abnormal vaginal discharge, vaginal itching see HPI  Musculoskeletal:  denies back pain.  Skin/Breast:  Denies any breast pain, lumps, or discharge.   Neurological:  denies headaches, extremity weakness or numbness.  Psychiatric: denies depression or anxiety.  Endocrine:   denies excessive thirst or urination.  Heme/Lymph:  denies history of anemia, easy bruising or bleeding.      PHYSICAL EXAM:     Vitals:    02/29/24 1445   BP: 127/89   Pulse: 93   Weight:  149 lb (67.6 kg)     Body mass index is 26.39 kg/m².         Constitutional: well developed, well nourished    Psychiatric:  Oriented to time, place, person and situation. Appropriate mood and affect    Pelvic Exam:  External Genitalia: left posterior vulva with small sebaceous cyst; normal appearance, hair distribution, and no lesions  Urethral Meatus:  normal in size, location, without lesions and prolapse  Bladder:  No fullness, masses or tenderness  Vagina:  Normal appearance without lesions, no abnormal discharge  Cervix:  Normal without tenderness on motion  Uterus: normal in size, contour, position, mobility, without tenderness  Adnexa: normal without masses or tenderness  Perineum: normal  Anus: no hemorroids   Lymph node: no inguinal lymph nodes    Assessment & Plan:  Alisa was seen today for gyn problem.    Diagnoses and all orders for this visit:    Pain, abdominal, RLQ  -     US PELVIS W EV (CPT=76856/63698); Future  -     Chlamydia/Gc Amplification; Future  -     Vaginitis Vaginosis PCR Panel; Future    Irregular periods  -     POC Urine pregnancy test [25098]    Sebaceous cyst    UPT negative  Pelvic US ordered  Cultures done  Discussed contraceptive options - progestin only due to hx of HTN - progestin pill, depo or mirena IUD.  Desires to use condoms  Rev'd sebaceous cyst common and can try warm compresses if desires to see if resolves.      PABLO Boucher    Spent total time 30 minutes on obtaining history / chart review, evaluating patient / performing medically appropriate exam, discussing treatment options, counseling / educating, and completing documentation, coordinating care.

## 2024-03-01 LAB
BV BACTERIA DNA VAG QL NAA+PROBE: NEGATIVE
C GLABRATA DNA VAG QL NAA+PROBE: NEGATIVE
C KRUSEI DNA VAG QL NAA+PROBE: NEGATIVE
C TRACH DNA SPEC QL NAA+PROBE: NEGATIVE
CANDIDA DNA VAG QL NAA+PROBE: NEGATIVE
N GONORRHOEA DNA SPEC QL NAA+PROBE: NEGATIVE
T VAGINALIS DNA VAG QL NAA+PROBE: NEGATIVE

## 2024-03-03 ENCOUNTER — OFFICE VISIT (OUTPATIENT)
Dept: FAMILY MEDICINE CLINIC | Facility: CLINIC | Age: 39
End: 2024-03-03
Payer: MEDICAID

## 2024-03-03 VITALS
BODY MASS INDEX: 28.32 KG/M2 | WEIGHT: 150 LBS | DIASTOLIC BLOOD PRESSURE: 82 MMHG | HEIGHT: 61 IN | RESPIRATION RATE: 18 BRPM | TEMPERATURE: 101 F | HEART RATE: 109 BPM | SYSTOLIC BLOOD PRESSURE: 116 MMHG | OXYGEN SATURATION: 97 %

## 2024-03-03 DIAGNOSIS — J11.1 INFLUENZA-LIKE ILLNESS: Primary | ICD-10-CM

## 2024-03-03 LAB
CONTROL LINE PRESENT WITH A CLEAR BACKGROUND (YES/NO): YES YES/NO
KIT LOT #: NORMAL NUMERIC
STREP GRP A CUL-SCR: NEGATIVE

## 2024-03-03 PROCEDURE — 87637 SARSCOV2&INF A&B&RSV AMP PRB: CPT | Performed by: PHYSICIAN ASSISTANT

## 2024-03-03 RX ORDER — OSELTAMIVIR PHOSPHATE 75 MG/1
75 CAPSULE ORAL 2 TIMES DAILY
Qty: 10 CAPSULE | Refills: 0 | Status: SHIPPED | OUTPATIENT
Start: 2024-03-03 | End: 2024-03-08

## 2024-03-03 NOTE — PROGRESS NOTES
CHIEF COMPLAINT:     Chief Complaint   Patient presents with    Cough     2 days w/ sore throat, headache, hard to catch breath, body aches, chills, and cough.        HPI:   Alisa Toussaint is a 38 year old female who presents for upper respiratory symptoms for  2 days. Patient reports sore throat, congestion, fever with Tmax to 101, cough with yellowish colored sputum, aches, chills, fatigue, headache . Symptoms have been worsening since onset.  Treating symptoms with Advil every 6 hours, tea with lemon.  Reports feeling quite unwell.  Feels occasionally short of breath.  .      Current Outpatient Medications   Medication Sig Dispense Refill    acyclovir 400 MG Oral Tab Take 1 tablet (400 mg total) by mouth 3 (three) times daily. for 5 days 15 tablet 0    clobetasol 0.05 % External Solution APPLY TO SCALP TWICE DAILY AS DIRECTED 50 mL 5    ketoconazole 2 % External Shampoo APPLY TO SCALP TWICE WEEKLY 120 mL 5    triamcinolone 0.1 % External Cream Apply topically 2 (two) times daily. 80 g 1    Adapalene-Benzoyl Peroxide (EPIDUO) 0.1-2.5 % External Gel Use bid to affected areas of face, chest, back as directed 45 g 0    Salicylic Acid (KERALYT) 6 % External Gel Apply 1 Application topically every evening. apply by topical route  every day as directed to warts 40 g 3    amLODIPine 5 MG Oral Tab Take 1 tablet (5 mg total) by mouth daily. 90 tablet 3    acyclovir 5 % External Ointment Please apply ointment on affected area  every 3 hours while awake for 5 days 1 each 1    Ascorbic Acid (VITAMIN C) 100 MG Oral Tab Take by mouth.      MULTIPLE VITAMINS-MINERALS ER OR Take by mouth.      Cholecalciferol (VITAMIN D) 125 MCG (5000 UT) Oral Cap Take by mouth.        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     Sexual activity: Yes     Partners: Male   Other Topics Concern    Caffeine Concern No    Exercise No    Pt has a pacemaker No    Pt has a defibrillator No    Breast feeding No    Reaction to local anesthetic No   Social History Narrative    The patient does not use an assistive device..      The patient does live in a home with stairs.         REVIEW OF SYSTEMS:   GENERAL good fluid intake, lower appetite  SKIN: no rashes or abnormal skin lesions  HEENT: See HPI  LUNGS: See HPI  CARDIOVASCULAR: denies chest pain or palpitations   GI: denies N/V/C or abdominal pain      EXAM:   /82   Pulse 109   Temp (!) 101.4 °F (38.6 °C)   Resp 18   Ht 5' 1\" (1.549 m)   Wt 150 lb (68 kg)   LMP 2024 (Exact Date)   SpO2 97%   BMI 28.34 kg/m²   GENERAL: well developed, well nourished,ill appearing but in no apparent distress  SKIN: no rashes,no suspicious lesions  HEAD: atraumatic, normocephalic.   EYES: conjunctiva clear, EOM intact  EARS: TM's non injected, no bulging, no retraction,no fluid, bony landmarks visible  NOSE: Nostrils patent, mild, clear nasal discharge, nasal mucosa reddened   THROAT: Oral mucosa pink, moist. Posterior pharynx is minimally erythematous. No exudates. Tonsils 2/4.    NECK: Supple, non-tender  LUNGS: clear to auscultation bilaterally, no wheezes or rhonchi.  Breathing is non labored.  CARDIO: mildly tachycardic, likely secondary to fever  EXTREMITIES: no cyanosis, clubbing or edema  LYMPH:  No cervical or submandibular lymphadenopathy.      Recent Results (from the past 24 hour(s))   Strep A Assay W/Optic    Collection Time: 03/03/24  3:04 PM   Result Value Ref Range    Strep Grp A Screen negative Negative    Control Line Present with a clear background (yes/no) yes Yes/No    Kit Lot # [id number] Numeric    Kit Expiration Date 05/21/2025 Date       ASSESSMENT AND PLAN:   Alisa Toussaint is a 38 year old female who presents with upper respiratory symptoms that are consistent with    ASSESSMENT:   Encounter Diagnosis   Name Primary?    Influenza-like illness Yes       PLAN: Empiric tx with Tamiflu.  Strep testing negative. Meds as below.  Comfort care as described in Patient Instructions    Complications of influenza discussed. Including secondary infections like AOM, bronchitis, PNA, sinusitis.  Patient needs to be rechecked if exhibiting any symptoms of these illnesses.     Advised to go to ED for severe sore throat/inability to tolerate oral secretions.       Meds & Refills for this Visit:  Requested Prescriptions     Signed Prescriptions Disp Refills    oseltamivir (TAMIFLU) 75 MG Oral Cap 10 capsule 0     Sig: Take 1 capsule (75 mg total) by mouth 2 (two) times daily for 5 days.     Risks, benefits, and side effects of medication explained and discussed.    The patient indicates understanding of these issues and agrees to the plan.  The patient is asked to f/u with PCP if sx's persist or worsen.

## 2024-03-03 NOTE — PATIENT INSTRUCTIONS
Rest as much as possible   Drink lots of fluids-- aim to drink at least 60 ounces of water in addition to electrolyte replacement drinks.   You may take Ibuprofen 600 mg every 6 hours with a little food and you may add Acetaminophen 650 mg every 4 hours as needed for fever control and body aches.   Throat lozenges or hard candies to soothe sore throat and lessen cough   1 tsp of honey as needed to help coat throat and suppress cough   Warm salt water gargles to sooth sore throat   You can use your preferred OTC cold/cough medication just be sure to check the ingredients so you are not taking too much acetaminophen or ibuprofen.   If you have significant nasal congestion and no history of blood pressure issues you can take Sudafed (pseudoephedrine) 4-6 hours formulation behind the pharmacy counter

## 2024-03-04 LAB
FLUAV + FLUBV RNA SPEC NAA+PROBE: DETECTED
FLUAV + FLUBV RNA SPEC NAA+PROBE: NOT DETECTED
RSV RNA SPEC NAA+PROBE: NOT DETECTED
SARS-COV-2 RNA RESP QL NAA+PROBE: NOT DETECTED

## 2024-03-24 ENCOUNTER — APPOINTMENT (OUTPATIENT)
Dept: GENERAL RADIOLOGY | Facility: HOSPITAL | Age: 39
End: 2024-03-24
Attending: EMERGENCY MEDICINE
Payer: MEDICAID

## 2024-03-24 ENCOUNTER — HOSPITAL ENCOUNTER (EMERGENCY)
Facility: HOSPITAL | Age: 39
Discharge: HOME | End: 2024-03-24
Attending: EMERGENCY MEDICINE
Payer: MEDICAID

## 2024-03-24 ENCOUNTER — OFFICE VISIT (OUTPATIENT)
Dept: FAMILY MEDICINE CLINIC | Facility: CLINIC | Age: 39
End: 2024-03-24
Payer: MEDICAID

## 2024-03-24 VITALS
WEIGHT: 140 LBS | OXYGEN SATURATION: 99 % | SYSTOLIC BLOOD PRESSURE: 154 MMHG | HEIGHT: 62 IN | TEMPERATURE: 98 F | BODY MASS INDEX: 25.76 KG/M2 | RESPIRATION RATE: 22 BRPM | DIASTOLIC BLOOD PRESSURE: 103 MMHG | HEART RATE: 86 BPM

## 2024-03-24 VITALS
BODY MASS INDEX: 28 KG/M2 | DIASTOLIC BLOOD PRESSURE: 104 MMHG | SYSTOLIC BLOOD PRESSURE: 142 MMHG | HEART RATE: 90 BPM | OXYGEN SATURATION: 99 % | HEIGHT: 61 IN | TEMPERATURE: 99 F | RESPIRATION RATE: 22 BRPM

## 2024-03-24 DIAGNOSIS — B34.9 VIRAL SYNDROME: Primary | ICD-10-CM

## 2024-03-24 DIAGNOSIS — R05.1 ACUTE COUGH: ICD-10-CM

## 2024-03-24 DIAGNOSIS — R06.02 SHORTNESS OF BREATH: Primary | ICD-10-CM

## 2024-03-24 LAB
CONTROL LINE PRESENT WITH A CLEAR BACKGROUND (YES/NO): YES YES/NO
FLUAV + FLUBV RNA SPEC NAA+PROBE: NEGATIVE
FLUAV + FLUBV RNA SPEC NAA+PROBE: NEGATIVE
KIT LOT #: NORMAL NUMERIC
OPERATOR ID: NORMAL
RAPID SARS-COV-2 BY PCR: NOT DETECTED
RSV RNA SPEC NAA+PROBE: NEGATIVE
SARS-COV-2 RNA RESP QL NAA+PROBE: NOT DETECTED
STREP GRP A CUL-SCR: NEGATIVE

## 2024-03-24 PROCEDURE — 0241U SARS-COV-2/FLU A AND B/RSV BY PCR (GENEXPERT): CPT | Performed by: EMERGENCY MEDICINE

## 2024-03-24 PROCEDURE — 87880 STREP A ASSAY W/OPTIC: CPT | Performed by: NURSE PRACTITIONER

## 2024-03-24 PROCEDURE — 99215 OFFICE O/P EST HI 40 MIN: CPT | Performed by: NURSE PRACTITIONER

## 2024-03-24 PROCEDURE — 99284 EMERGENCY DEPT VISIT MOD MDM: CPT

## 2024-03-24 PROCEDURE — 71045 X-RAY EXAM CHEST 1 VIEW: CPT | Performed by: EMERGENCY MEDICINE

## 2024-03-24 PROCEDURE — U0002 COVID-19 LAB TEST NON-CDC: HCPCS | Performed by: NURSE PRACTITIONER

## 2024-03-24 RX ORDER — GUAIFENESIN 600 MG/1
600 TABLET, EXTENDED RELEASE ORAL 2 TIMES DAILY
Qty: 30 TABLET | Refills: 0 | Status: SHIPPED | OUTPATIENT
Start: 2024-03-24

## 2024-03-24 NOTE — ED INITIAL ASSESSMENT (HPI)
Pt presents to ed from  for SOB. Pt states she has been having SOB and cough for 1 week and it worsened today. Denies chest pain or fevers. Nyquil at 1pm.      Pt tetsted flu + on 3/3.

## 2024-03-24 NOTE — PROGRESS NOTES
CHIEF COMPLAINT:     Chief Complaint   Patient presents with    Cough     Sx 1 month - Productive cough, ST, fever (did not check), body aches, chills, nausea, dizziness  Denies chest pain  No Covid test   OTC NyQuil and ibuprofen       HPI:   Alias Toussaint is a 38 year old female presents to clinic with complaint of cough/URI symptoms starting early this month on 3/3/24.  Was seen in [facility] at onset and tested positive for influenza A.  Given Tamiflu and was feeling some improvement.  Then about 8 days ago felt like everything worsened again.  Currently with c/o productive cough, SOB, sore throat, tactile temp, body aches, chills, nausea, some dizziness.  Denies chest pain, wheezing, GI complaints, or rash.  She has been taking Nyquil consistently 2-3 x/day for the past 7 days.  BP elevated today and was WNL last visit.  No known ill contacts or travel.    Current Outpatient Medications   Medication Sig Dispense Refill    acyclovir 400 MG Oral Tab Take 1 tablet (400 mg total) by mouth 3 (three) times daily. for 5 days 15 tablet 0    clobetasol 0.05 % External Solution APPLY TO SCALP TWICE DAILY AS DIRECTED 50 mL 5    ketoconazole 2 % External Shampoo APPLY TO SCALP TWICE WEEKLY 120 mL 5    triamcinolone 0.1 % External Cream Apply topically 2 (two) times daily. 80 g 1    Adapalene-Benzoyl Peroxide (EPIDUO) 0.1-2.5 % External Gel Use bid to affected areas of face, chest, back as directed 45 g 0    Salicylic Acid (KERALYT) 6 % External Gel Apply 1 Application topically every evening. apply by topical route  every day as directed to warts 40 g 3    amLODIPine 5 MG Oral Tab Take 1 tablet (5 mg total) by mouth daily. 90 tablet 3    acyclovir 5 % External Ointment Please apply ointment on affected area  every 3 hours while awake for 5 days 1 each 1    Ascorbic Acid (VITAMIN C) 100 MG Oral Tab Take by mouth.      MULTIPLE VITAMINS-MINERALS ER OR Take by mouth.      Cholecalciferol (VITAMIN D) 125 MCG (5000 UT) Oral Cap  Take by mouth.        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     Sexual activity: Yes     Partners: Male   Other Topics Concern    Caffeine Concern No    Exercise No    Pt has a pacemaker No    Pt has a defibrillator No    Breast feeding No    Reaction to local anesthetic No   Social History Narrative    The patient does not use an assistive device..      The patient does live in a home with stairs.        REVIEW OF SYSTEMS:   GENERAL HEALTH: feels well otherwise, normal appetite  SKIN: denies any unusual skin lesions or rashes  HEENT: denies ear pain or difficulty swallowing/eating. See HPI  RESPIRATORY: denies shortness of breath or wheezing  CARDIOVASCULAR: denies chest pain or palpitations   GI: denies vomiting or diarrhea  NEURO: denies dizziness or lightheadedness    EXAM:   BP (!) 142/104   Pulse 90   Temp 98.5 °F (36.9 °C)   Resp 22   Ht 5' 1\" (1.549 m)   LMP 03/01/2024 (Exact Date)   SpO2 99%   BMI 28.34 kg/m²   GENERAL: Non-toxic, well developed, well nourished, in no apparent distress  SKIN: no rashes, no suspicious lesions  HEAD: atraumatic, normocephalic  EYES: conjunctiva clear, EOM intact  EARS: TM's clear, non-injected, no bulging, retraction, or fluid bilaterally  NOSE:  nostrils patent, no exudates, nasal mucosa pink and noninflamed  THROAT: oral mucosa pink, moist. +Posterior pharynx erythematous and injected. No exudates.   NECK: supple, non-tender  LUNGS: clear to auscultation bilaterally, no wheezes or rhonchi. Breathing is mildly labored, RR 22-24.  +Dry cough.  CARDIO: RRR without murmur  LYMPH: No lymphadenopathy.    No results found for this or any previous visit (from the past 24 hour(s)).      ASSESSMENT AND PLAN:   Assessment:   Alisa was seen today for cough.    Diagnoses and all orders for this visit:    Shortness of breath    Acute cough          Plan:   - Influenza A positive on 3/3.  Had improvement and then abrupt worsening 8 days ago.  +SOB, RR 22-24 in clinic.  Negative rapid COVID.  BP elevated from last visit this month- prior /82, currently 142/104.  Hx of HTN.  Advised to discontinue Nyquil but would recommend further eval for now.  - Will refer to higher level of care in ED for further work up and treatment.  - Pt agreeable with referral.  - Pt's friend is here with her and will drive pt, declines EMS.  - Pt verbalizes understanding and is agreeable w/ plan.  - Pt leaves clinic in no distress.    There are no Patient Instructions on file for this visit.

## 2024-03-24 NOTE — ED PROVIDER NOTES
Patient Seen in: [facility] Emergency Department    History     Chief Complaint   Patient presents with    Shortness Of Breath    Cough/URI       HPI    History is provided by patient/independent historian: Patient, patient's family  38 year old female with history of bipolar disease, hypertension, recent influenza earlier this month, here with complaints of acute onset shortness of breath and cough for the past week.  She was seen at immediate care because it was worsening.  Patient's  family reports she had a negative rapid COVID and was sent here for further evaluation.  Positive sick contacts at home of child with similar symptoms.    History reviewed.   Past Medical History:   Diagnosis Date    Acne     Bipolar II disorder (HCC)     Breast mass, left 2014    Constipation 10/29/2015    Depression     Essential hypertension     Fibroadenoma of breast, left     Migraines     Pt denies hx of migraines 4/3/15    Positive TB test     INH    Postconcussion syndrome 2014    PPD positive 2016    Sexually transmitted disease     Trich     Trichomonal vaginitis 2016         History reviewed.   Past Surgical History:   Procedure Laterality Date    BREAST BIOPSY Left 2014      2007    NEEDLE BIOPSY LEFT  2014         Home Medications reviewed :  (Not in a hospital admission)        History reviewed.   Social History     Socioeconomic History    Marital status:    Tobacco Use    Smoking status: Never     Passive exposure: Never    Smokeless tobacco: Never   Vaping Use    Vaping Use: Never used   Substance and Sexual Activity    Alcohol use: No     Alcohol/week: 0.0 standard drinks of alcohol    Drug use: No     Comment: None.     Sexual activity: Yes     Partners: Male   Other Topics Concern    Caffeine Concern No    Exercise No    Pt has a pacemaker No    Pt has a defibrillator No    Breast feeding No    Reaction to local anesthetic No         ROS  Review of  Systems   Respiratory:  Positive for cough and shortness of breath.    Cardiovascular:  Negative for chest pain.   All other systems reviewed and are negative.     All other pertinent organ systems are reviewed and are negative.      Physical Exam     ED Triage Vitals [03/24/24 1519]   BP (!) 154/103   Pulse 86   Resp 22   Temp 97.9 °F (36.6 °C)   Temp src Oral   SpO2 99 %   O2 Device None (Room air)     Vital signs reviewed.      Physical Exam  Vitals and nursing note reviewed.   Cardiovascular:      Pulses: Normal pulses.   Pulmonary:      Effort: No respiratory distress.      Comments: Speaking in complete sentences, no wheezing rhonchi  Abdominal:      General: There is no distension.   Neurological:      Mental Status: She is alert.         ED Course       Labs:     Labs Reviewed   SARS-COV-2/FLU A AND B/RSV BY PCR (GENEXPERT) - Normal    Narrative:     This test is intended for the qualitative detection and differentiation of SARS-CoV-2, influenza A, influenza B, and respiratory syncytial virus (RSV) viral RNA in nasopharyngeal or nares swabs from individuals suspected of respiratory viral infection consistent with COVID-19 by their healthcare provider. Signs and symptoms of respiratory viral infection due to SARS-CoV-2, influenza, and RSV can be similar.                                    Test performed using the Xpert Xpress SARS-CoV-2/FLU/RSV (real time RT-PCR)  assay on the GeneXpert instrument, [employer], [place] [address].                   This test is being used under the Food and Drug Administration's Emergency Use Authorization.                                    The authorized Fact Sheet for Healthcare Providers for this assay is available upon request from the laboratory.         My EKG Interpretation:   As reviewed and Interpreted by me      Imaging Results Available and Reviewed while in ED:   XR CHEST AP PORTABLE  (CPT=71045)    Result Date: 3/24/2024  CONCLUSION: No radiographic evidence of  acute cardiopulmonary abnormality.    Dictated by (CST): Grayson Schmidt MD on 3/24/2024 at 5:26 PM     Finalized by (CST): Grayson Schmidt MD on 3/24/2024 at 5:27 PM         My review and independent interpretation of XR images: no infiltrate. Radiology report corroborates this in addition to other details as reported by them.      Decision rules/scores evaluated: none      Diagnostic labs/tests considered but not ordered: CBC,BMP, ddimer    ED Medications Administered: Medications - No data to display         Satting 99% on RA without distress    MDM       Medical Decision Making      Differential Diagnosis: After obtaining the patient's history, performing the physical exam and reviewing the diagnostics, multiple initial diagnoses were considered based on the presenting problem including PE, pneumonia, viral syndrome, pleurisy, pneumothorax    External document review: I personally reviewed available external medical records for any recent pertinent discharge summaries, testing, and procedures - the findings are as follows: IC visitiwth [facility] Madrigal for SOB    Complicating Factors: The patient already  has a past medical history of Acne, Bipolar II disorder (HCC), Breast mass, left (09/08/2014), Constipation (10/29/2015), Depression, Essential hypertension, Fibroadenoma of breast, left, Migraines, Positive TB test (2011), Postconcussion syndrome (12/12/2014), PPD positive (11/30/2016), Sexually transmitted disease, and Trichomonal vaginitis (06/06/2016). to contribute to the complexity of this ED evaluation.    Procedures performed: none    Discussed management with physician/appropriate source: none    Considered admission/deescalation of care for: none    Social determinants of health affecting patient care: none    Prescription medications considered: mucinex, discussed continuing current medication regimen    The patient requires continuous monitoring for: SOB, cough    Shared decision making: discussed possible  admission          Disposition and Plan     Clinical Impression:  1. Viral syndrome        Disposition:  Discharge    Follow-up:  Haroon Demarco MD  [address] [place] [address] [phone]    Follow up        Medications Prescribed:  Current Discharge Medication List

## 2024-04-15 ENCOUNTER — OFFICE VISIT (OUTPATIENT)
Dept: INTERNAL MEDICINE CLINIC | Facility: CLINIC | Age: 39
End: 2024-04-15

## 2024-04-15 ENCOUNTER — TELEPHONE (OUTPATIENT)
Dept: INTERNAL MEDICINE CLINIC | Facility: CLINIC | Age: 39
End: 2024-04-15

## 2024-04-15 VITALS
SYSTOLIC BLOOD PRESSURE: 116 MMHG | WEIGHT: 149 LBS | HEIGHT: 62 IN | DIASTOLIC BLOOD PRESSURE: 82 MMHG | HEART RATE: 82 BPM | BODY MASS INDEX: 27.42 KG/M2 | OXYGEN SATURATION: 98 %

## 2024-04-15 DIAGNOSIS — J02.9 PHARYNGITIS, UNSPECIFIED ETIOLOGY: ICD-10-CM

## 2024-04-15 DIAGNOSIS — J02.9 PHARYNGITIS, UNSPECIFIED ETIOLOGY: Primary | ICD-10-CM

## 2024-04-15 PROCEDURE — 99213 OFFICE O/P EST LOW 20 MIN: CPT

## 2024-04-15 PROCEDURE — 87880 STREP A ASSAY W/OPTIC: CPT

## 2024-04-15 RX ORDER — LIDOCAINE HYDROCHLORIDE 20 MG/ML
SOLUTION OROPHARYNGEAL
Qty: 100 ML | Refills: 0 | Status: SHIPPED | OUTPATIENT
Start: 2024-04-15 | End: 2024-04-15

## 2024-04-15 RX ORDER — LIDOCAINE HYDROCHLORIDE 20 MG/ML
SOLUTION OROPHARYNGEAL
Qty: 100 ML | Refills: 0 | Status: SHIPPED | OUTPATIENT
Start: 2024-04-15

## 2024-04-15 NOTE — PROGRESS NOTES
Subjective:   Alisa Toussaint is a 39 year old female who presents for Sore Throat (Started having a sore throat about 4 days ago) and Headache     Symptoms started Thursday or Friday   Sore throat since drinking a club soda with lemon and honey   Took ibuprofen   Having pain and difficulty swallowing even saliva  No fever or chills, no cough   Occasional light headedness   Had the flu last month, tested positive 3/3   And went to the ER 3/24 and was given guaifenesin and symptoms did resolve   Then started last week with sore throat, worse today     Took 4 days of amoxicillin that she had left over at home which did not help  Tried nyquil and guaifenesin     History/Other:    Chief Complaint Reviewed and Verified  Nursing Notes Reviewed and   Verified  Tobacco Reviewed  Allergies Reviewed  Medications Reviewed    Problem List Reviewed  Medical History Reviewed  Surgical History   Reviewed  OB Status Reviewed  Family History Reviewed         Tobacco:  She has never smoked tobacco.    Current Outpatient Medications   Medication Sig Dispense Refill    clobetasol 0.05 % External Solution APPLY TO SCALP TWICE DAILY AS DIRECTED 50 mL 5    ketoconazole 2 % External Shampoo APPLY TO SCALP TWICE WEEKLY 120 mL 5    triamcinolone 0.1 % External Cream Apply topically 2 (two) times daily. 80 g 1    Adapalene-Benzoyl Peroxide (EPIDUO) 0.1-2.5 % External Gel Use bid to affected areas of face, chest, back as directed 45 g 0    Salicylic Acid (KERALYT) 6 % External Gel Apply 1 Application topically every evening. apply by topical route  every day as directed to warts 40 g 3    amLODIPine 5 MG Oral Tab Take 1 tablet (5 mg total) by mouth daily. 90 tablet 3    acyclovir 5 % External Ointment Please apply ointment on affected area  every 3 hours while awake for 5 days 1 each 1    Ascorbic Acid (VITAMIN C) 100 MG Oral Tab Take by mouth.      MULTIPLE VITAMINS-MINERALS ER OR Take by mouth.      Cholecalciferol (VITAMIN D)  125 MCG (5000 UT) Oral Cap Take by mouth.      Lidocaine Viscous HCl 2 % Mouth/Throat Solution Swish and spit 5ml two to three times a day 100 mL 0    guaiFENesin  MG Oral Tablet 12 Hr Take 1 tablet (600 mg total) by mouth 2 (two) times daily. (Patient not taking: Reported on 4/15/2024) 30 tablet 0    acyclovir 400 MG Oral Tab Take 1 tablet (400 mg total) by mouth 3 (three) times daily. for 5 days (Patient not taking: Reported on 4/15/2024) 15 tablet 0       Review of Systems:  Review of Systems   Constitutional: Negative.    HENT:  Positive for sore throat and trouble swallowing.    Respiratory: Negative.     Cardiovascular: Negative.    Gastrointestinal: Negative.    Skin: Negative.    Neurological:  Positive for light-headedness.         Objective:   /82   Pulse 82   Ht 5' 2\" (1.575 m)   Wt 149 lb (67.6 kg)   LMP 04/10/2024 (Exact Date)   SpO2 98%   BMI 27.25 kg/m²  Estimated body mass index is 27.25 kg/m² as calculated from the following:    Height as of this encounter: 5' 2\" (1.575 m).    Weight as of this encounter: 149 lb (67.6 kg).  Physical Exam  Vitals reviewed.   Constitutional:       General: She is not in acute distress.     Appearance: Normal appearance. She is well-developed.   HENT:      Right Ear: Tympanic membrane normal.      Left Ear: Tympanic membrane normal.      Mouth/Throat:      Mouth: Mucous membranes are moist.      Pharynx: Posterior oropharyngeal erythema present.   Cardiovascular:      Rate and Rhythm: Normal rate and regular rhythm.      Heart sounds: Normal heart sounds.   Pulmonary:      Effort: Pulmonary effort is normal.      Breath sounds: Normal breath sounds.   Lymphadenopathy:      Cervical: No cervical adenopathy.   Skin:     General: Skin is warm and dry.   Neurological:      Mental Status: She is alert and oriented to person, place, and time.           Assessment & Plan:   1. Pharyngitis, unspecified etiology (Primary)  -     Strep A Assay W/Optic  -      Grp A Strep Cult, Throat; Future; Expected date: 04/15/2024  -     Lidocaine Viscous HCl; Swish and spit 5ml two to three times a day  Dispense: 100 mL; Refill: 0  -     Grp A Strep Cult, Throat  Stop amoxicillin - discussed risk of developing antibiotic resistance and that she should always see a provider for new symptoms that are severe or not resolving after several days.  Chloraseptic throat spray   Lots of fluids, warm tea with honey  Teaspoon of honey every 1-2 hours     PABLO Ash, 4/15/2024, 1:40 PM

## 2024-04-15 NOTE — TELEPHONE ENCOUNTER
Patient states [facility] doesn't have lidocaine solution.    I recent RX and RX status has not confirmed receipt.   Called [facility] and Verbal given to pharmacist.          Disp Refills Start End    Lidocaine Viscous HCl 2 % Mouth/Throat Solution 100 mL 0 4/15/2024 --    Sig: Swish and spit 5ml two to three times a day    Sent to pharmacy as: Lidocaine Viscous HCl 2 % Mouth/Throat Solution (XYLOCAINE)    E-Prescribing Status: Sent to pharmacy (4/15/2024  3:34 PM CDT)

## 2024-04-15 NOTE — PATIENT INSTRUCTIONS
Chloraseptic throat spray   Lots of fluids, warm tea with honey  Teaspoon of honey every 1-2 hours

## 2024-04-24 DIAGNOSIS — B00.1 PRIMARY HERPES SIMPLEX INFECTION OF LIPS: Primary | ICD-10-CM

## 2024-04-24 RX ORDER — ACYCLOVIR 400 MG/1
400 TABLET ORAL 3 TIMES DAILY
Qty: 15 TABLET | Refills: 0 | Status: SHIPPED | OUTPATIENT
Start: 2024-04-24

## 2024-04-24 NOTE — TELEPHONE ENCOUNTER
Refill Per Protocol     Requested Prescriptions   Pending Prescriptions Disp Refills    acyclovir 400 MG Oral Tab 15 tablet 0     Sig: Take 1 tablet (400 mg total) by mouth 3 (three) times daily. for 5 days       Herpes Agent Protocol Passed - 4/24/2024  4:07 PM        Passed - In person appointment or virtual visit in the past 12 mos or appointment in next 3 mos     Recent Outpatient Visits              1 week ago Pharyngitis, unspecified etiology    [facility] [place], [facility], Maddie Mitchell APRN    Office Visit    1 month ago Shortness of breath    [facility], [facility], Nidia Araujo APN    Office Visit    1 month ago Influenza-like illness    [facility], Lashell Marcos PA-C    Office Visit    1 month ago Pain, abdominal, RLQ    [facility]/Mariam Naqvi APRN    Office Visit    4 months ago Cervicalgia    [facility], [facility], Erich Arevalo MD    Office Visit                               Recent Outpatient Visits              1 week ago Pharyngitis, unspecified etiology    [facility], [facility], Maddie Mitchell APRN    Office Visit    1 month ago Shortness of breath    [facility], [facility], Nidia Araujo APN    Office Visit    1 month ago Influenza-like illness    [facility], Lashell Marcos PA-C    Office Visit    1 month ago Pain, abdominal, RLQ    [facility]Mariam [facility]    Office Visit    4 months ago [facility]icalgia    [facility]    [facility] Visit

## 2024-04-24 NOTE — TELEPHONE ENCOUNTER
Patient needs a refill on:    acyclovir 400 MG Oral Tab 15 tablet 0 1/22/2024 --   Sig:   Take 1 tablet (400 mg total) by mouth 3 (three) times daily. for 5 days     Patient not taking:   Reported on 4/15/2024         [id number] - [place], [place] - [address] AT [facility] [address] [place] [address], [phone], [phone]

## 2024-04-24 NOTE — TELEPHONE ENCOUNTER
Patient called requesting a refill on the following medication:      acyclovir 5 % External Ointment       Per patient she is completely out of her medication. Per patient she is having a flare-up.

## 2024-04-24 NOTE — TELEPHONE ENCOUNTER
Please review. Rx failed/no protocol.      Fabiana Verde  11 minutes ago (1:16 PM)     Patient called requesting a refill on the following medication:   Per patient she is completely out of her medication. Per patient she is having a flare-up.         Requested Prescriptions   Pending Prescriptions Disp Refills    acyclovir 5 % External Ointment 1 each 1     Sig: Please apply ointment on affected area  every 3 hours while awake for 5 days       There is no refill protocol information for this order            Recent Outpatient Visits              1 week ago Pharyngitis, unspecified etiology    [facility], [facility], [place]    Office Visit    1 month ago Shortness of breath    [facility] [place] [facility]    Office Visit    1 month ago Influenza-like illness    [facility] [place] [facility]    Office Visit    1 month ago Pain, abdominal, RLQ    [facility] Mariam Rodriguez APRN    Office Visit    4 months ago Cervicalgia    [facility], [place] Erich Mendoza MD    Office Visit

## 2024-04-25 ENCOUNTER — TELEPHONE (OUTPATIENT)
Dept: INTERNAL MEDICINE CLINIC | Facility: CLINIC | Age: 39
End: 2024-04-25

## 2024-04-25 RX ORDER — ACYCLOVIR 50 MG/G
OINTMENT TOPICAL
Qty: 30 G | Refills: 1 | Status: SHIPPED | OUTPATIENT
Start: 2024-04-25

## 2024-04-26 RX ORDER — ACYCLOVIR 400 MG/1
400 TABLET ORAL 3 TIMES DAILY
Qty: 15 TABLET | Refills: 0 | Status: SHIPPED | OUTPATIENT
Start: 2024-04-26

## 2024-04-26 NOTE — TELEPHONE ENCOUNTER
Denied    Note from payer: Details of this decision are provided on the physician outcome notice which has been faxed to the number on file.  Payer: [employer] [facility] Case ID: [id number]    [phone]  Electronic appeal: Not supported  View History

## 2024-04-26 NOTE — TELEPHONE ENCOUNTER
Acyclovir cream/ointment  not approved by insurance   But  tablets area approved by insurance -  sent to pharmacy   Acyclovir 400 mg 3 times daily -every 8 hours   for 5 days

## 2024-05-01 ENCOUNTER — TELEPHONE (OUTPATIENT)
Dept: INTERNAL MEDICINE CLINIC | Facility: CLINIC | Age: 39
End: 2024-05-01

## 2024-05-01 NOTE — TELEPHONE ENCOUNTER
acyclovir 5 % External Ointment, Please apply ointment on affected area  every 3 hours while awake for 5 days, Disp: 30 g, Rfl: 1      Key:[id number]  Patient Last name:Norbert  : 1985

## 2024-05-03 ENCOUNTER — TELEPHONE (OUTPATIENT)
Dept: INTERNAL MEDICINE CLINIC | Facility: CLINIC | Age: 39
End: 2024-05-03

## 2024-05-03 NOTE — TELEPHONE ENCOUNTER
acyclovir 5 % External Ointment, Please apply ointment on affected area  every 3 hours while awake for 5 days, Disp: 30 g, Rfl: 1    Key:[id number]  Patient Last Name: Norbert  :1985

## 2024-07-09 ENCOUNTER — HOSPITAL ENCOUNTER (OUTPATIENT)
Dept: ULTRASOUND IMAGING | Facility: HOSPITAL | Age: 39
Discharge: HOME | End: 2024-07-09
Attending: NURSE PRACTITIONER
Payer: MEDICAID

## 2024-07-09 DIAGNOSIS — R10.31 PAIN, ABDOMINAL, RLQ: ICD-10-CM

## 2024-07-09 PROCEDURE — 76830 TRANSVAGINAL US NON-OB: CPT | Performed by: NURSE PRACTITIONER

## 2024-07-09 PROCEDURE — 76856 US EXAM PELVIC COMPLETE: CPT | Performed by: NURSE PRACTITIONER

## 2024-08-27 ENCOUNTER — OFFICE VISIT (OUTPATIENT)
Dept: INTERNAL MEDICINE CLINIC | Facility: CLINIC | Age: 39
End: 2024-08-27

## 2024-08-27 ENCOUNTER — HOSPITAL ENCOUNTER (OUTPATIENT)
Dept: GENERAL RADIOLOGY | Age: 39
Discharge: HOME | End: 2024-08-27
Attending: NURSE PRACTITIONER
Payer: MEDICAID

## 2024-08-27 VITALS
WEIGHT: 143 LBS | DIASTOLIC BLOOD PRESSURE: 86 MMHG | BODY MASS INDEX: 26.31 KG/M2 | SYSTOLIC BLOOD PRESSURE: 126 MMHG | HEIGHT: 62 IN | HEART RATE: 73 BPM

## 2024-08-27 DIAGNOSIS — M25.531 RIGHT WRIST PAIN: Primary | ICD-10-CM

## 2024-08-27 DIAGNOSIS — M25.531 RIGHT WRIST PAIN: ICD-10-CM

## 2024-08-27 PROCEDURE — 73100 X-RAY EXAM OF WRIST: CPT | Performed by: NURSE PRACTITIONER

## 2024-08-27 PROCEDURE — 99213 OFFICE O/P EST LOW 20 MIN: CPT | Performed by: NURSE PRACTITIONER

## 2024-08-27 NOTE — PROGRESS NOTES
Alisa Toussaint is a 39 year old female.  HPI:   Pt c/o R wrist pain for the past 3 weeks that started after cleaning out her garage. She reports a throbbing pain. Sheis using a wrist wrap but not helping. Wrist feels tender. No swelling or redness. Denies any fever or chills. She tried NSAID OTC, exercises for the wrist but not helping.   Current Outpatient Medications   Medication Sig Dispense Refill    acyclovir 400 MG Oral Tab Take 1 tablet (400 mg total) by mouth 3 (three) times daily. For 5 days 15 tablet 0    acyclovir 5 % External Ointment Please apply ointment on affected area  every 3 hours while awake for 5 days 30 g 1    acyclovir 400 MG Oral Tab Take 1 tablet (400 mg total) by mouth 3 (three) times daily. for 5 days 15 tablet 0    Lidocaine Viscous HCl 2 % Mouth/Throat Solution Swish and spit 5ml two to three times a day 100 mL 0    clobetasol 0.05 % External Solution APPLY TO SCALP TWICE DAILY AS DIRECTED 50 mL 5    ketoconazole 2 % External Shampoo APPLY TO SCALP TWICE WEEKLY 120 mL 5    triamcinolone 0.1 % External Cream Apply topically 2 (two) times daily. 80 g 1    Adapalene-Benzoyl Peroxide (EPIDUO) 0.1-2.5 % External Gel Use bid to affected areas of face, chest, back as directed 45 g 0    Salicylic Acid (KERALYT) 6 % External Gel Apply 1 Application topically every evening. apply by topical route  every day as directed to warts 40 g 3    amLODIPine 5 MG Oral Tab Take 1 tablet (5 mg total) by mouth daily. 90 tablet 3    Ascorbic Acid (VITAMIN C) 100 MG Oral Tab Take by mouth.      MULTIPLE VITAMINS-MINERALS ER OR Take by mouth.      Cholecalciferol (VITAMIN D) 125 MCG (5000 UT) Oral Cap Take by mouth.      guaiFENesin  MG Oral Tablet 12 Hr Take 1 tablet (600 mg total) by mouth 2 (two) times daily. (Patient not taking: Reported on 4/15/2024) 30 tablet 0      Past Medical History:    Acne    Bipolar II disorder (HCC)    Breast mass, left    Constipation    Depression    Essential  hypertension    Fibroadenoma of breast, left    Migraines    Pt denies hx of migraines 4/3/15    Positive TB test    INH    Postconcussion syndrome    PPD positive    Sexually transmitted disease    Trich 5/16    Trichomonal vaginitis      Social History:  Social History     Socioeconomic History    Marital status:    Tobacco Use    Smoking status: Never     Passive exposure: Never    Smokeless tobacco: Never   Vaping Use    Vaping status: Never Used   Substance and Sexual Activity    Alcohol use: No     Alcohol/week: 0.0 standard drinks of alcohol    Drug use: No     Comment: None.     Sexual activity: Yes     Partners: Male   Other Topics Concern    Caffeine Concern No    Exercise No    Pt has a pacemaker No    Pt has a defibrillator No    Breast feeding No    Reaction to local anesthetic No   Social History Narrative    The patient does not use an assistive device..      The patient does live in a home with stairs.     Social Determinants of Health      [facility]    Housing Stability        REVIEW OF SYSTEMS:   Review of Systems   All other systems reviewed and are negative.         EXAM:   /86 (BP Location: Left arm, Patient Position: Sitting, Cuff Size: adult)   Pulse 73   Ht 5' 2\" (1.575 m)   Wt 143 lb (64.9 kg)   LMP 07/29/2024 (Exact Date)   BMI 26.16 kg/m²     Physical Exam  Vitals reviewed.   HENT:      Head: Normocephalic.   Eyes:      General: No scleral icterus.     Conjunctiva/sclera: Conjunctivae normal.   Musculoskeletal:      Right wrist: Tenderness and bony tenderness present. No swelling, deformity, effusion, lacerations, snuff box tenderness or crepitus. Decreased range of motion. Normal pulse.      Left wrist: Normal.   Skin:     General: Skin is warm.      Coloration: Skin is not jaundiced.   Neurological:      Mental Status: She is alert and oriented to person, place, and time.      Cranial Nerves: No cranial  nerve deficit.      Sensory: No sensory deficit.      Motor: Weakness present.   Psychiatric:         Mood and Affect: Mood normal.         Judgment: Judgment normal.            ASSESSMENT AND PLAN:   1. Right wrist pain  -Rest, Ice, Tylenol BID PRN, wrist immobilizer wrap.   -Referral to OT  -Xray ordered.   - XR WRIST (2 VIEWS), RIGHT (CPT=73100); Future  - Occupational Therapy Referral - [place] [facility]       The patient indicates understanding of these issues and agrees to the plan.  The patient is asked to return in 4-6 weeks/PRN.     The above note was creating using Dragon speech recognition technology. Please excuse any typos.

## 2024-09-04 ENCOUNTER — TELEPHONE (OUTPATIENT)
Dept: PHYSICAL THERAPY | Facility: HOSPITAL | Age: 39
End: 2024-09-04

## 2024-09-05 ENCOUNTER — OFFICE VISIT (OUTPATIENT)
Dept: DERMATOLOGY CLINIC | Facility: CLINIC | Age: 39
End: 2024-09-05

## 2024-09-05 ENCOUNTER — TELEPHONE (OUTPATIENT)
Dept: DERMATOLOGY CLINIC | Facility: CLINIC | Age: 39
End: 2024-09-05

## 2024-09-05 DIAGNOSIS — L73.8 SEBACEOUS HYPERPLASIA: Primary | ICD-10-CM

## 2024-09-05 PROCEDURE — 99213 OFFICE O/P EST LOW 20 MIN: CPT | Performed by: PHYSICIAN ASSISTANT

## 2024-09-05 RX ORDER — TAZAROTENE 1 MG/G
1 CREAM TOPICAL NIGHTLY
Qty: 60 G | Refills: 1 | Status: SHIPPED | OUTPATIENT
Start: 2024-09-05 | End: 2024-10-05

## 2024-09-05 NOTE — PROGRESS NOTES
HPI:    Patient ID: Alisa Toussaint is a 39 year old female.    Patient presents with bumps under both eyes and nose since 3 years. Previously seen by KMT and was given Salicylic Acid (KERALYT) 6 % External Gel and tretinoin 0.1% cream. Using these meds everyday but bumps are still present. No draining or tenderness noted. Hx of allergies to medications noted.         Review of Systems   Constitutional:  Negative for chills and fever.   Musculoskeletal:  Negative for arthralgias and myalgias.   Skin:  Positive for rash. Negative for color change and wound.            Current Outpatient Medications   Medication Sig Dispense Refill    Tazarotene (TAZORAC) 0.1 % External Cream Apply 1 Application topically nightly. 60 g 1    acyclovir 400 MG Oral Tab Take 1 tablet (400 mg total) by mouth 3 (three) times daily. For 5 days 15 tablet 0    acyclovir 5 % External Ointment Please apply ointment on affected area  every 3 hours while awake for 5 days 30 g 1    acyclovir 400 MG Oral Tab Take 1 tablet (400 mg total) by mouth 3 (three) times daily. for 5 days 15 tablet 0    Lidocaine Viscous HCl 2 % Mouth/Throat Solution Swish and spit 5ml two to three times a day 100 mL 0    clobetasol 0.05 % External Solution APPLY TO SCALP TWICE DAILY AS DIRECTED 50 mL 5    ketoconazole 2 % External Shampoo APPLY TO SCALP TWICE WEEKLY 120 mL 5    triamcinolone 0.1 % External Cream Apply topically 2 (two) times daily. 80 g 1    Adapalene-Benzoyl Peroxide (EPIDUO) 0.1-2.5 % External Gel Use bid to affected areas of face, chest, back as directed 45 g 0    Salicylic Acid (KERALYT) 6 % External Gel Apply 1 Application topically every evening. apply by topical route  every day as directed to warts 40 g 3    amLODIPine 5 MG Oral Tab Take 1 tablet (5 mg total) by mouth daily. 90 tablet 3    Ascorbic Acid (VITAMIN C) 100 MG Oral Tab Take by mouth.      MULTIPLE VITAMINS-MINERALS ER OR Take by mouth.      Cholecalciferol (VITAMIN D) 125 MCG (5000 UT)  Oral Cap Take by mouth.      guaiFENesin  MG Oral Tablet 12 Hr Take 1 tablet (600 mg total) by mouth 2 (two) times daily. (Patient not taking: Reported on 4/15/2024) 30 tablet 0     Allergies:  Allergies   Allergen Reactions    Minocycline      Other reaction(s): Rash    Pollen     Wheat Gluten       LMP 07/29/2024 (Exact Date)   There is no height or weight on file to calculate BMI.  PHYSICAL EXAM:   Physical Exam  Constitutional:       General: She is not in acute distress.     Appearance: Normal appearance.   Skin:     General: Skin is warm and dry.      Findings: Rash present.      Comments: Flesh colored papules noted under the eyes. No draining or tenderness noted. No scaling noted.    Neurological:      Mental Status: She is alert and oriented to person, place, and time.                ASSESSMENT/PLAN:   1. Sebaceous hyperplasia  -After discussion with patient, advised the following:  -Refilled tazorac for the patient.   -Educated to use with moisturizer  -Apply every other night.   -To call or follow-up with worsening symptoms or concerns.   -Pt was agreeable to plan and will comply with discussion above.     - Tazarotene (TAZORAC) 0.1 % External Cream; Apply 1 Application topically nightly.  Dispense: 60 g; Refill: 1      No orders of the defined types were placed in this encounter.      Meds This Visit:  Requested Prescriptions     Signed Prescriptions Disp Refills    Tazarotene (TAZORAC) 0.1 % External Cream 60 g 1     Sig: Apply 1 Application topically nightly.       Imaging & Referrals:  None         ID#[id number]

## 2024-09-05 NOTE — TELEPHONE ENCOUNTER
Fax from [facility]    placed fax in pa inbox    Denied for Tazarotene cream

## 2024-09-06 ENCOUNTER — TELEPHONE (OUTPATIENT)
Dept: INTERNAL MEDICINE CLINIC | Facility: CLINIC | Age: 39
End: 2024-09-06

## 2024-09-06 NOTE — TELEPHONE ENCOUNTER
Received fax from [facility].   Forms were placed on provider's desk for review.

## 2024-09-11 DIAGNOSIS — L70.0 ACNE VULGARIS: Primary | ICD-10-CM

## 2024-09-11 NOTE — TELEPHONE ENCOUNTER
Refill Request for medication(s):   triamcinolone 0.1 % External Cream   Tretinoin 0.1 % External Cream     Last Office Visit: 09/05/24 with NK; 12/13/23 with KMT    Last Refill: 12/13/23    Pharmacy, Dosage verified:   [facility] #[id number] - [place] [address]  AT [facility] [place], [phone], [phone]       Condition Update (if applicable):  S/w pt. She is using triamcinolone for her ezcema prn which helps with itching on her neck, back and face.   Pt is using Tretinoin for her acne on face 3x/wk.  Pt was asking if a F/U appt with you is needed for refill.     Rx pended and sent to provider for approval, please advise. Thank You!

## 2024-09-12 ENCOUNTER — TELEPHONE (OUTPATIENT)
Dept: INTERNAL MEDICINE CLINIC | Facility: CLINIC | Age: 39
End: 2024-09-12

## 2024-09-12 RX ORDER — TRIAMCINOLONE ACETONIDE 1 MG/G
CREAM TOPICAL 2 TIMES DAILY
Qty: 80 G | Refills: 1 | Status: SHIPPED | OUTPATIENT
Start: 2024-09-12

## 2024-09-12 NOTE — TELEPHONE ENCOUNTER
Received fax from [facility].  Forms were placed on provider's desk for review.

## 2024-09-13 ENCOUNTER — OFFICE VISIT (OUTPATIENT)
Dept: OBGYN CLINIC | Facility: CLINIC | Age: 39
End: 2024-09-13

## 2024-09-13 VITALS
DIASTOLIC BLOOD PRESSURE: 86 MMHG | HEART RATE: 83 BPM | BODY MASS INDEX: 25.76 KG/M2 | WEIGHT: 140 LBS | SYSTOLIC BLOOD PRESSURE: 120 MMHG | HEIGHT: 62 IN

## 2024-09-13 DIAGNOSIS — Z01.419 WELL WOMAN EXAM WITH ROUTINE GYNECOLOGICAL EXAM: Primary | ICD-10-CM

## 2024-09-13 DIAGNOSIS — N92.1 MENORRHAGIA WITH IRREGULAR CYCLE: ICD-10-CM

## 2024-09-13 DIAGNOSIS — Z12.31 ENCOUNTER FOR SCREENING MAMMOGRAM FOR MALIGNANT NEOPLASM OF BREAST: ICD-10-CM

## 2024-09-13 DIAGNOSIS — Z12.4 SCREENING FOR CERVICAL CANCER: ICD-10-CM

## 2024-09-13 PROCEDURE — 99395 PREV VISIT EST AGE 18-39: CPT | Performed by: NURSE PRACTITIONER

## 2024-09-13 RX ORDER — TRETINOIN 1 MG/G
1 CREAM TOPICAL NIGHTLY
Qty: 45 G | Refills: 3 | Status: SHIPPED | OUTPATIENT
Start: 2024-09-13 | End: 2024-10-13

## 2024-09-13 RX ORDER — DROSPIRENONE 4 MG/1
1 TABLET, FILM COATED ORAL DAILY
Qty: 28 TABLET | Refills: 12 | Status: SHIPPED | OUTPATIENT
Start: 2024-09-13 | End: 2025-09-13

## 2024-09-13 NOTE — TELEPHONE ENCOUNTER
Dr. Leo - spoke with pt and she would also like the tretinoin refilled.  Tazorac was sent in error.  Rx pended.  Thank you.

## 2024-09-13 NOTE — PROGRESS NOTES
[facility]    Obstetrics and Gynecology    Chief Complaint   Patient presents with    Gyn Exam     ANNUAL EXAM       Alisa Toussaint is a 39 year old female  Patient's last menstrual period was 2024 (exact date). presenting for annual gynecology exam.  Last seen 2024 for abdominal pain. States now noticing related to ovulation. Hx of heavy periods. Had uterine polyps removed in .    Hx of HTN on amlodipine and hx of acne . Last visit she had a normal pelvic ultrasound but was complaining of irregular periods. We discussed progestin birth control options. She desires to try slynd today to see if helps with regulating cycle, acne and ovulation pain.     She is sexually active with her boyfriend. Desires sti testing. Not using contraception.     Hx of HSV cold sores     Pap:2019 NILM, neg HPv  Contraception: none    OBSTETRICS HISTORY:  OB History    Para Term  AB Living   1 1 1 0 0 1   SAB IAB Ectopic Multiple Live Births   0 0 0 0 1       GYNE HISTORY:  Menarche: 15  (2024  9:56 AM)  Period Cycle (Days): irregular  (2024  9:56 AM)  Period Duration (Days): 8  days (2024  9:56 AM)  Period Flow: 4 days heavy with clots (2024  9:56 AM)  Use of Birth Control (if yes, specify type): None (2024  9:56 AM)  Pap Date: 22 (2024  9:56 AM)  Pap Result Notes: PAP/HPV NEG (2024  9:56 AM)  Follow Up Recommendation: MAMMO 19 BETTIE BENIGN (2024  9:56 AM)      History   Sexual Activity    Sexual activity: Yes    Partners: Male    Birth control/ protection:            Latest Ref Rng & Units 2019     1:01 PM   RECENT PAP RESULTS   Thinprep Pap Negative for intraepithelial lesion or malignancy Negative for intraepithelial lesion or malignancy    HPV Negative Negative          MEDICAL HISTORY:  Past Medical History:    Acne    Bipolar II disorder (HCC)    Breast mass, left    Constipation    Depression    Essential hypertension     Fibroadenoma of breast, left    Migraines    Pt denies hx of migraines 4/3/15    Positive TB test    INH    Postconcussion syndrome    PPD positive    Sexually transmitted disease    Trich     Trichomonal vaginitis     Past Surgical History:   Procedure Laterality Date    Breast biopsy Left 2014      2007    Needle biopsy left  2014       SOCIAL HISTORY:  Social History     Socioeconomic History    Marital status:      Spouse name: Not on file    Number of children: Not on file    Years of education: Not on file    Highest education level: Not on file   Occupational History    Not on file   Tobacco Use    Smoking status: Never     Passive exposure: Never    Smokeless tobacco: Never   Vaping Use    Vaping status: Never Used   Substance and Sexual Activity    Alcohol use: No     Alcohol/week: 0.0 standard drinks of alcohol    Drug use: No     Comment: None.     Sexual activity: Yes     Partners: Male   Other Topics Concern     Service Not Asked    Blood Transfusions Not Asked    Caffeine Concern No    Occupational Exposure Not Asked    Hobby Hazards Not Asked    Sleep Concern Not Asked    Stress Concern Not Asked    Weight Concern Not Asked    Special Diet Not Asked    Back Care Not Asked    Exercise No    Bike Helmet Not Asked    Seat Belt Not Asked    Self-Exams Not Asked    Grew up on a farm Not Asked    History of tanning Not Asked    Outdoor occupation Not Asked    Pt has a pacemaker No    Pt has a defibrillator No    Breast feeding No    Reaction to local anesthetic No   Social History Narrative    The patient does not use an assistive device..      The patient does live in a home with stairs.     Social Determinants of Health     Financial Resource Strain: Not on file   Food Insecurity: Not on file   Transportation Needs: Not on file   Physical Activity: Not on file   Stress: Not on file   Social Connections: Not on file   Housing Stability: Low Risk  (2022)    [facility]  [facility]    Housing Stability     Mortgage Payment Concerns?: Not on file     Number of Places Lived in the Last Year: Not on file     Unstable Housing?: Not on file         Depression Screening (PHQ-2/PHQ-9): Over the LAST 2 WEEKS   Little interest or pleasure in doing things (over the last two weeks)?: Not at all    Feeling down, depressed, or hopeless (over the last two weeks)?: Not at all    PHQ-2 SCORE: 0           FAMILY HISTORY:  Family History   Problem Relation Age of Onset    Infectious Disease Father         tuberculosis    Asthma Father        MEDICATIONS:    Current Outpatient Medications:     Drospirenone (SLYND) 4 MG Oral Tab, Take 1 tablet by mouth daily., Disp: 28 tablet, Rfl: 12    acyclovir 400 MG Oral Tab, Take 1 tablet (400 mg total) by mouth 3 (three) times daily. for 5 days, Disp: 15 tablet, Rfl: 0    triamcinolone 0.1 % External Cream, Apply topically 2 (two) times daily., Disp: 80 g, Rfl: 1    Tazarotene (TAZORAC) 0.1 % External Cream, Apply 1 Application topically nightly., Disp: 60 g, Rfl: 1    acyclovir 5 % External Ointment, Please apply ointment on affected area  every 3 hours while awake for 5 days, Disp: 30 g, Rfl: 1    clobetasol 0.05 % External Solution, APPLY TO SCALP TWICE DAILY AS DIRECTED, Disp: 50 mL, Rfl: 5    ketoconazole 2 % External Shampoo, APPLY TO SCALP TWICE WEEKLY, Disp: 120 mL, Rfl: 5    Adapalene-Benzoyl Peroxide (EPIDUO) 0.1-2.5 % External Gel, Use bid to affected areas of face, chest, back as directed, Disp: 45 g, Rfl: 0    Salicylic Acid (KERALYT) 6 % External Gel, Apply 1 Application topically every evening. apply by topical route  every day as directed to warts, Disp: 40 g, Rfl: 3    amLODIPine 5 MG Oral Tab, Take 1 tablet (5 mg total) by mouth daily., Disp: 90 tablet, Rfl: 3    Ascorbic Acid (VITAMIN C) 100 MG Oral Tab, Take by mouth., Disp: , Rfl:     MULTIPLE VITAMINS-MINERALS ER OR, Take by mouth.,  Disp: , Rfl:     Cholecalciferol (VITAMIN D) 125 MCG (5000 UT) Oral Cap, Take by mouth., Disp: , Rfl:     ALLERGIES:    Allergies   Allergen Reactions    Minocycline      Other reaction(s): Rash    Pollen     Wheat Gluten          Review of Systems:  Constitutional:  Denies fatigue, night sweats, hot flashes  Eyes:  denies blurred or double vision  Cardiovascular:  denies chest pain or palpitations  Respiratory:  denies shortness of breath  Gastrointestinal:  denies heartburn, abdominal pain, diarrhea or constipation  Genitourinary:  denies dysuria, incontinence, abnormal vaginal discharge, vaginal itching, see HPI  Musculoskeletal:  denies back pain   Skin/Breast:  Denies any breast pain, lumps, or discharge. +acne  Neurological:  denies headaches, extremity weakness or numbness.  Psychiatric: denies depression or anxiety.  Endocrine:   denies excessive thirst or urination.  Heme/Lymph:  denies history of anemia, easy bruising or bleeding.      PHYSICAL EXAM:     Vitals:    09/13/24 0957   BP: 120/86   Pulse: 83   Weight: 140 lb (63.5 kg)   Height: 5' 2\" (1.575 m)       Body mass index is 25.61 kg/m².     Patient offered chaperone, patient declined    Constitutional: well developed, well nourished  Psychiatric:  Oriented to time, place, person and situation. Appropriate mood and affect  Head/Face: normocephalic  Neck/Thyroid: thyroid symmetric, no thyromegaly, no nodules, no adenopathy  Lymphatic:no abnormal supraclavicular or axillary adenopathy is noted  Breast: normal without palpable masses, tenderness, asymmetry, nipple discharge, nipple retraction or skin changes  Abdomen:  soft, nontender, nondistended, no masses  Skin/Hair: no unusual rashes or bruises  Extremities: no edema, no cyanosis    Pelvic Exam:  External Genitalia: normal appearance, hair distribution, and no lesions  Urethral Meatus:  normal in size, location, without lesions and prolapse  Bladder:  No fullness, masses or tenderness  Vagina:   Normal appearance without lesions, no abnormal discharge  Cervix:  Normal without tenderness on motion  Uterus: normal in size, contour, position, mobility, without tenderness  Adnexa: normal without masses or tenderness  Perineum: normal  Anus: no hemorroids     Assessment & Plan:    ICD-10-CM    1. Well woman exam with routine gynecological exam  Z01.419       2. Screening for cervical cancer  Z12.4 ThinPrep PAP Smear     Hpv Dna  High Risk , Thin Prep Collect     ThinPrep PAP Smear     Hpv Dna  High Risk , Thin Prep Collect      3. Encounter for screening mammogram for malignant neoplasm of breast  Z12.31 [facility] 2D+3D SCREENING BILAT (CPT=77067/98859)      4. Menorrhagia with irregular cycle  N92.1 Drospirenone (SLYND) 4 MG Oral Tab         Reviewed ASCCP guidelines with the patient   Pap done today  Contraception: Using none. Desires trial of slynd for ovulation pain and heavy and irregular periods. Start with next period - reviewed risks benefits and how to take. Follow up in 3-4 months  Declines sti testing --Encouraged condoms to prevent STD exposure  Breast Health:     Reviewed current guidelines with the patient and to start Mammograms at age 40 - ordered  Reviewed monthly self breast exams with the patient   Discussed diet, exercise, MVIs with Ca/Vit D  Follow up in 1 yr for PABLO Carrillo    This note was prepared using Dragon Medical voice recognition dictation software. As a result errors may occur. When identified these errors have been corrected. While every attempt is made to correct errors during dictation discrepancies may still exist.

## 2024-09-18 LAB — HPV E6+E7 MRNA CVX QL NAA+PROBE: NEGATIVE

## 2024-09-27 ENCOUNTER — OFFICE VISIT (OUTPATIENT)
Dept: INTERNAL MEDICINE CLINIC | Facility: CLINIC | Age: 39
End: 2024-09-27

## 2024-09-27 ENCOUNTER — HOSPITAL ENCOUNTER (OUTPATIENT)
Dept: GENERAL RADIOLOGY | Age: 39
Discharge: HOME | End: 2024-09-27
Attending: INTERNAL MEDICINE
Payer: MEDICAID

## 2024-09-27 VITALS
DIASTOLIC BLOOD PRESSURE: 80 MMHG | BODY MASS INDEX: 26.68 KG/M2 | HEART RATE: 93 BPM | HEIGHT: 62 IN | WEIGHT: 145 LBS | SYSTOLIC BLOOD PRESSURE: 120 MMHG

## 2024-09-27 DIAGNOSIS — I10 PRIMARY HYPERTENSION: ICD-10-CM

## 2024-09-27 DIAGNOSIS — Z00.00 PE (PHYSICAL EXAM), ROUTINE: ICD-10-CM

## 2024-09-27 DIAGNOSIS — M25.531 RIGHT WRIST PAIN: Primary | ICD-10-CM

## 2024-09-27 DIAGNOSIS — M25.531 RIGHT WRIST PAIN: ICD-10-CM

## 2024-09-27 DIAGNOSIS — E55.9 VITAMIN D DEFICIENCY: ICD-10-CM

## 2024-09-27 PROCEDURE — 99214 OFFICE O/P EST MOD 30 MIN: CPT | Performed by: INTERNAL MEDICINE

## 2024-09-27 PROCEDURE — 73110 X-RAY EXAM OF WRIST: CPT | Performed by: INTERNAL MEDICINE

## 2024-09-27 RX ORDER — AMLODIPINE BESYLATE 5 MG/1
5 TABLET ORAL DAILY
Qty: 90 TABLET | Refills: 3 | Status: SHIPPED | OUTPATIENT
Start: 2024-09-27

## 2024-09-27 NOTE — PROGRESS NOTES
Subjective:   Patient ID: Alisa Toussaint is a 39 year old female.  Chief Complaint   Patient presents with    Wrist Pain     C/o right wrist pain for 3 months.  Stts she was lifting boxes when she felt the pain.     R  wrist  painful with  any  movement   patient  state  was  lifting  boxes at home  on July 28 and  felt  same pain .  State she as in IC  and XR  wis  normal   Had visit and was recommended PT -  doing  PT  but state  is  not  helping   Using Ice  ,ibuprofen   400 daily and  is not  helping   4  times  for  PT - did not help     Wrist Pain   The pain is present in the right wrist. This is a chronic problem. Episode onset: 2.5 months. There has been no history of extremity trauma. The problem has been unchanged. The quality of the pain is described as aching (with movements). Associated symptoms include joint swelling. Pertinent negatives include no fever, joint locking, limited range of motion, numbness, stiffness or tingling. The symptoms are aggravated by activity. She has tried rest, NSAIDS, OTC pain meds and cold (PT) for the symptoms. The treatment provided mild relief.       History/Other:   Review of Systems   Constitutional:  Negative for chills, fatigue and fever.   HENT:  Negative for ear pain and sore throat.    Eyes:  Negative for pain and redness.   Respiratory:  Negative for cough, shortness of breath and wheezing.    Cardiovascular:  Negative for chest pain, palpitations and leg swelling.   Gastrointestinal:  Negative for abdominal pain, constipation, diarrhea, nausea and vomiting.   Genitourinary:  Negative for dysuria and frequency.   Musculoskeletal:  Positive for arthralgias. Negative for stiffness. Back pain: r   wrist pain.  Skin:  Negative for pallor.   Neurological:  Negative for dizziness, tingling, numbness and headaches.   Psychiatric/Behavioral:  Negative for confusion. The patient is not nervous/anxious.      Current Outpatient Medications   Medication Sig Dispense  Refill    amLODIPine 5 MG Oral Tab Take 1 tablet (5 mg total) by mouth daily. 90 tablet 3    triamcinolone 0.1 % External Cream Apply topically 2 (two) times daily. 80 g 1    clobetasol 0.05 % External Solution APPLY TO SCALP TWICE DAILY AS DIRECTED 50 mL 5    ketoconazole 2 % External Shampoo APPLY TO SCALP TWICE WEEKLY 120 mL 5    Salicylic Acid (KERALYT) 6 % External Gel Apply 1 Application topically every evening. apply by topical route  every day as directed to warts 40 g 3    Ascorbic Acid (VITAMIN C) 100 MG Oral Tab Take by mouth.      MULTIPLE VITAMINS-MINERALS ER OR Take by mouth.      Cholecalciferol (VITAMIN D) 125 MCG (5000 UT) Oral Cap Take by mouth.      Drospirenone (SLYND) 4 MG Oral Tab Take 1 tablet by mouth daily. (Patient not taking: Reported on 9/27/2024) 28 tablet 12    acyclovir 5 % External Ointment Please apply ointment on affected area  every 3 hours while awake for 5 days (Patient not taking: Reported on 9/27/2024) 30 g 1    acyclovir 400 MG Oral Tab Take 1 tablet (400 mg total) by mouth 3 (three) times daily. for 5 days (Patient not taking: Reported on 9/27/2024) 15 tablet 0    Adapalene-Benzoyl Peroxide (EPIDUO) 0.1-2.5 % External Gel Use bid to affected areas of face, chest, back as directed (Patient not taking: Reported on 9/27/2024) 45 g 0     Allergies:  Allergies   Allergen Reactions    Minocycline      Other reaction(s): Rash    Pollen     Wheat Gluten        Objective:   Physical Exam  Vitals and nursing note reviewed.   Constitutional:       General: She is not in acute distress.  HENT:      Head: Normocephalic and atraumatic.   Cardiovascular:      Rate and Rhythm: Normal rate and regular rhythm.      Heart sounds: Normal heart sounds. No murmur heard.  Pulmonary:      Effort: Pulmonary effort is normal.      Breath sounds: Normal breath sounds. No wheezing or rales.   Abdominal:      Palpations: Abdomen is soft. There is no mass.      Tenderness: There is no abdominal  tenderness.   Musculoskeletal:      Right wrist: Bony tenderness present. Decreased range of motion.        Hands:       Cervical back: Normal range of motion.      Right lower leg: No edema.      Left lower leg: No edema.      Comments: R lat wrist Mild localized  swelling ,   localized bony tenderness   No erythema    Skin:     General: Skin is warm and dry.      Findings: No erythema.   Neurological:      Mental Status: She is alert and oriented to person, place, and time.       Blood pressure 120/80, pulse 93, height 5' 2\" (1.575 m), weight 145 lb (65.8 kg), last menstrual period 08/24/2024, not currently breastfeeding.    Assessment & Plan:   1. Right wrist pain  - XR WRIST NAVICULAR COMPLETE (4 VIEWS), RIGHT (CPT=73110); Future  Rest   Avoid  movements   Stop PT  Ice  3-  x per day   Ibuprofen 400 mg  every day w  food  XR wrist repeat ,might need  MRI   Refer to Ortho   Side effects - heartburns  and directions of medication discussed with patient. Patient verbalized understanding and compliance.    - Ortho Referral - In Network  - Uric Acid; Future    2. Primary hypertension  Take high blood pressure medication as perscribed   Keep a Low- sodium diet   Maintain walking and activity - as tolerated   Keep a log of blood pressure and heart rate at home   Patient verbalizes understanding and compliance   - Comp Metabolic Panel (14); Futures  Stable  Cpm    Amlodipine 5 mg every day     - CBC With Differential With Platelet; Future  - Comp Metabolic Panel (14); Future  - TSH W Reflex To Free T4; Future  - Lipid Panel; Future    4. Vitamin D deficiency  - Vitamin D; Future   Labs     Orders Placed This Encounter   Procedures    CBC With Differential With Platelet    Comp Metabolic Panel (14)    TSH W Reflex To Free T4    Lipid Panel    Uric Acid    Vitamin D       Meds This Visit:  Requested Prescriptions     Signed Prescriptions Disp Refills    amLODIPine 5 MG Oral Tab 90 tablet 3     Sig: Take 1 tablet (5 mg  total) by mouth daily.       Imaging & Referrals:  ORTHOPEDIC - INTERNAL  XR WRIST NAVICULAR COMPLETE (4 VIEWS), RIGHT (CPT=73981)

## 2024-09-28 ENCOUNTER — LAB ENCOUNTER (OUTPATIENT)
Dept: LAB | Age: 39
End: 2024-09-28
Attending: INTERNAL MEDICINE
Payer: MEDICAID

## 2024-09-28 DIAGNOSIS — I10 PRIMARY HYPERTENSION: ICD-10-CM

## 2024-09-28 DIAGNOSIS — Z00.00 PE (PHYSICAL EXAM), ROUTINE: ICD-10-CM

## 2024-09-28 DIAGNOSIS — E55.9 VITAMIN D DEFICIENCY: ICD-10-CM

## 2024-09-28 DIAGNOSIS — M25.531 RIGHT WRIST PAIN: ICD-10-CM

## 2024-09-28 LAB
ALBUMIN SERPL-MCNC: 4.3 G/DL (ref 3.2–4.8)
ALBUMIN/GLOB SERPL: 1.6 {RATIO} (ref 1–2)
ALP LIVER SERPL-CCNC: 44 U/L
ALT SERPL-CCNC: 10 U/L
ANION GAP SERPL CALC-SCNC: 8 MMOL/L (ref 0–18)
AST SERPL-CCNC: 15 U/L (ref ?–34)
BASOPHILS # BLD AUTO: 0.05 X10(3) UL (ref 0–0.2)
BASOPHILS NFR BLD AUTO: 0.9 %
BILIRUB SERPL-MCNC: 0.5 MG/DL (ref 0.3–1.2)
BUN BLD-MCNC: 12 MG/DL (ref 9–23)
BUN/CREAT SERPL: 14.1 (ref 10–20)
CALCIUM BLD-MCNC: 9.1 MG/DL (ref 8.7–10.4)
CHLORIDE SERPL-SCNC: 109 MMOL/L (ref 98–112)
CHOLEST SERPL-MCNC: 195 MG/DL (ref ?–200)
CO2 SERPL-SCNC: 26 MMOL/L (ref 21–32)
CREAT BLD-MCNC: 0.85 MG/DL
DEPRECATED RDW RBC AUTO: 39.8 FL (ref 35.1–46.3)
EGFRCR SERPLBLD CKD-EPI 2021: 89 ML/MIN/1.73M2 (ref 60–?)
EOSINOPHIL # BLD AUTO: 0.17 X10(3) UL (ref 0–0.7)
EOSINOPHIL NFR BLD AUTO: 3.1 %
ERYTHROCYTE [DISTWIDTH] IN BLOOD BY AUTOMATED COUNT: 11.8 % (ref 11–15)
FASTING PATIENT LIPID ANSWER: YES
FASTING STATUS PATIENT QL REPORTED: YES
GLOBULIN PLAS-MCNC: 2.7 G/DL (ref 2–3.5)
GLUCOSE BLD-MCNC: 85 MG/DL (ref 70–99)
HCT VFR BLD AUTO: 42.6 %
HDLC SERPL-MCNC: 53 MG/DL (ref 40–59)
HGB BLD-MCNC: 14.8 G/DL
IMM GRANULOCYTES # BLD AUTO: 0.01 X10(3) UL (ref 0–1)
IMM GRANULOCYTES NFR BLD: 0.2 %
LDLC SERPL CALC-MCNC: 108 MG/DL (ref ?–100)
LYMPHOCYTES # BLD AUTO: 1.78 X10(3) UL (ref 1–4)
LYMPHOCYTES NFR BLD AUTO: 32.9 %
MCH RBC QN AUTO: 32.2 PG (ref 26–34)
MCHC RBC AUTO-ENTMCNC: 34.7 G/DL (ref 31–37)
MCV RBC AUTO: 92.6 FL
MONOCYTES # BLD AUTO: 0.31 X10(3) UL (ref 0.1–1)
MONOCYTES NFR BLD AUTO: 5.7 %
NEUTROPHILS # BLD AUTO: 3.09 X10 (3) UL (ref 1.5–7.7)
NEUTROPHILS # BLD AUTO: 3.09 X10(3) UL (ref 1.5–7.7)
NEUTROPHILS NFR BLD AUTO: 57.2 %
NONHDLC SERPL-MCNC: 142 MG/DL (ref ?–130)
OSMOLALITY SERPL CALC.SUM OF ELEC: 295 MOSM/KG (ref 275–295)
PLATELET # BLD AUTO: 309 10(3)UL (ref 150–450)
POTASSIUM SERPL-SCNC: 3.7 MMOL/L (ref 3.5–5.1)
PROT SERPL-MCNC: 7 G/DL (ref 5.7–8.2)
RBC # BLD AUTO: 4.6 X10(6)UL
SODIUM SERPL-SCNC: 143 MMOL/L (ref 136–145)
TRIGL SERPL-MCNC: 194 MG/DL (ref 30–149)
TSI SER-ACNC: 2.78 MIU/ML (ref 0.55–4.78)
URATE SERPL-MCNC: 4.4 MG/DL
VIT D+METAB SERPL-MCNC: 41.4 NG/ML (ref 30–100)
VLDLC SERPL CALC-MCNC: 33 MG/DL (ref 0–30)
WBC # BLD AUTO: 5.4 X10(3) UL (ref 4–11)

## 2024-09-28 PROCEDURE — 84550 ASSAY OF BLOOD/URIC ACID: CPT

## 2024-09-28 PROCEDURE — 80061 LIPID PANEL: CPT

## 2024-09-28 PROCEDURE — 80053 COMPREHEN METABOLIC PANEL: CPT

## 2024-09-28 PROCEDURE — 85025 COMPLETE CBC W/AUTO DIFF WBC: CPT

## 2024-09-28 PROCEDURE — 84443 ASSAY THYROID STIM HORMONE: CPT

## 2024-09-28 PROCEDURE — 36415 COLL VENOUS BLD VENIPUNCTURE: CPT

## 2024-09-28 PROCEDURE — 82306 VITAMIN D 25 HYDROXY: CPT

## 2024-10-15 ENCOUNTER — MED REC SCAN ONLY (OUTPATIENT)
Dept: INTERNAL MEDICINE CLINIC | Facility: CLINIC | Age: 39
End: 2024-10-15

## 2024-10-31 ENCOUNTER — TELEPHONE (OUTPATIENT)
Dept: ORTHOPEDICS CLINIC | Facility: CLINIC | Age: 39
End: 2024-10-31

## 2024-10-31 NOTE — TELEPHONE ENCOUNTER
Patient called for right wrist pain. Please advise if additional imaging needed   Future Appointments   Date Time Provider Department Center   12/23/2024 11:00 AM [facility] Bull [id number]

## 2024-11-21 ENCOUNTER — TELEPHONE (OUTPATIENT)
Facility: CLINIC | Age: 39
End: 2024-11-21

## 2024-11-21 ENCOUNTER — OFFICE VISIT (OUTPATIENT)
Facility: CLINIC | Age: 39
End: 2024-11-21
Payer: MEDICAID

## 2024-11-21 VITALS — BODY MASS INDEX: 27.23 KG/M2 | HEIGHT: 62 IN | WEIGHT: 148 LBS

## 2024-11-21 DIAGNOSIS — M25.531 RIGHT WRIST PAIN: Primary | ICD-10-CM

## 2024-11-21 DIAGNOSIS — M25.831 ULNAR ABUTMENT SYNDROME OF RIGHT WRIST: Primary | ICD-10-CM

## 2024-11-21 PROCEDURE — 99204 OFFICE O/P NEW MOD 45 MIN: CPT | Performed by: STUDENT IN AN ORGANIZED HEALTH CARE EDUCATION/TRAINING PROGRAM

## 2024-11-21 NOTE — PROGRESS NOTES
Clinic Note     Allergies[1]    CC: Right ulnar wrist pain    HPI: This 39 year old RHD female presents with right ulnar sided wrist pain. This has been present for several months, although Alisa states the pain became worse after lifting boxes late summer of 2024. She does not recall a specific injury, but reports the event of moving boxes as the point where her pain distinctly worsened and now persists. She has seen an outside hand surgeon, Dr. Bain, who has treated her for right ulnar impaction syndrome. She has tried activity modification, anti-inflammatory medications, occupational therapy, wrist widget, and kinesiotaping with mild relief. More recently she has tried an ulnar-sided wrist steroid injection on 10/18/24 which she states gave her significant relief, but only for 1-2 weeks. She has not tried a period of strict immobilization.    Treatments Tried: as mentioned above    Occupation, Activites: homemaker; household chores, cleaning, dishes  Review of Systems:  Negative unless stated in HPI.    History/Other:   Past Medical History:    Acne    Bipolar II disorder (HCC)    Breast mass, left    Constipation    Depression    Essential hypertension    Fibroadenoma of breast, left    Migraines    Pt denies hx of migraines 4/3/15    Positive TB test    INH    Postconcussion syndrome    PPD positive    Sexually transmitted disease    Trich     Trichomonal vaginitis     Past Surgical History:   Procedure Laterality Date    Breast biopsy Left 2014          Needle biopsy left  2014     Current Outpatient Medications   Medication Sig Dispense Refill    amLODIPine 5 MG Oral Tab Take 1 tablet (5 mg total) by mouth daily. 90 tablet 3    Drospirenone (SLYND) 4 MG Oral Tab Take 1 tablet by mouth daily. (Patient not taking: Reported on 2024) 28 tablet 12    triamcinolone 0.1 % External Cream Apply topically 2 (two) times daily. 80 g 1    acyclovir 5 % External Ointment Please apply  ointment on affected area  every 3 hours while awake for 5 days (Patient not taking: Reported on 9/27/2024) 30 g 1    acyclovir 400 MG Oral Tab Take 1 tablet (400 mg total) by mouth 3 (three) times daily. for 5 days (Patient not taking: Reported on 9/27/2024) 15 tablet 0    clobetasol 0.05 % External Solution APPLY TO SCALP TWICE DAILY AS DIRECTED 50 mL 5    ketoconazole 2 % External Shampoo APPLY TO SCALP TWICE WEEKLY 120 mL 5    Adapalene-Benzoyl Peroxide (EPIDUO) 0.1-2.5 % External Gel Use bid to affected areas of face, chest, back as directed (Patient not taking: Reported on 9/27/2024) 45 g 0    Salicylic Acid (KERALYT) 6 % External Gel Apply 1 Application topically every evening. apply by topical route  every day as directed to warts 40 g 3    Ascorbic Acid (VITAMIN C) 100 MG Oral Tab Take by mouth.      MULTIPLE VITAMINS-MINERALS ER OR Take by mouth.      Cholecalciferol (VITAMIN D) 125 MCG (5000 UT) Oral Cap Take by mouth.       Family History   Problem Relation Age of Onset    Infectious Disease Father         tuberculosis    Asthma Father      Social History     Occupational History    Not on file   Tobacco Use    Smoking status: Never     Passive exposure: Never    Smokeless tobacco: Never   Vaping Use    Vaping status: Never Used   Substance and Sexual Activity    Alcohol use: No    Drug use: No     Comment: None.     Sexual activity: Yes     Partners: Male        Physical Exam:     Ht 5' 2\" (1.575 m)   Wt 148 lb (67.1 kg)   LMP 08/24/2024 (Exact Date)   BMI 27.07 kg/m²     Constitutional: NAD. AOx3. Well-developed and Well-nourished.   Psychiatric: Normal mood/ affect/ behavior. Judgment and thought content normal.     Right Upper Extremity:     Inspection    Skin intact. No skin lesions. No obvious mass visualized. No crepitus to ROM. Mild to moderate swelling over the ulnar side of the wrist   Palpation    no tenderness to palpation at the radial styloid  mild tenderness to palpation at the DRUJ  no  tenderness to palpation at the SL interval  no tenderness to palpation at the 1st carpometacarpal joint   Moderate Tenderness to palpation of the ulnar fovea, TFCC      ROM    ROM is grossly normal including flexion / extension, and pronosupination of the forearm.    Pain with hypersupination.     Able to make a composite fist with 0mm fingertip to distal palmar crease  Able to extend digits     Neurovascular    Normal sensation in the median, ulnar, and radial nerve distribution. Normal motor function of muscles innervated by median/AIN, ulnar, and radial/PIN nerves.    Normally perfused hand(s).     Special    Negative ECU synergy test  + Pain with resisted ulnar deviation of the wrist in forearm supination  + Pain with forced ulnar deviation, compression of the ulnar fovea/TFCC  DRUJ stable with Ballottement test in pronation, supination and neutral forearm rotation; however the patient does have pain with any DRUJ manipulation              Diagnostic Studies:  I reviewed the images independently from the professional interpretation, and discussed my interpretation of the pertinent findings with patient/family.    Xrays of Right wrist 3V: On my independent review of the radiographs, there is evidence of 4mm of ulnar positivity. No acute abnormalities are noted. No DRUJ arthritis or malalignment of radius and ulna. No cystic changes are noted within the lunate or ulnar head.    MRI of the right wrist reviewed and shows central TFCC tearing, no cystic changes within lunate or ulnar head.    Assessment/Plan:  39 year old female with right ulnar sided wrist pain consistent with ulnar impaction syndrome.    I reviewed the patient's electronic medical record, including the pertinent office notes, medical/surgical history, medications and images. I specifically reviewed the images noted above, independently and discussed my independent interpretation of these images in combination with the pertinent positive and  negative findings in my clinical exam with the patient    Right ulnar impaction syndrome    Today I discussed with Alisa Toussaint the ulnar sided wrist pain, possible diagnoses and various treatment options. I reviewed the radiographs and clinical exam findings and their significance with Alisa Toussaint. she does have 4mm of ulnar positive variance on XR. Non-operative modalities include activity modification, NSAIDs, immobilization/splints/braces -- specifically casting, wrist widget or kinesiotape to offload the ulnar wrist, hand therapy and corticosteroid injections - were discussed. She has tried several of these treatments already without significant relief. However, she has not tried strict immobilization, which I believe is also a reasonable course of treatment. I discussed casting for a period of 4 weeks as an option. Indications for surgical treatment were also explained, and this would consist of right wrist arthroscopy with TFCC debridement vs repair, and right ulnar shortening osteotomy. At this time Alisa Toussaint is interested in proceeding with continued conservative treatment with occupational therapy and activity modification. She is potentially interested in a trial of 4-week cast immobilization - she will let me know in the future if she decides to pursue this route, or continue following with Dr. Bain.    Alisa Toussaint is pleased with this plan; she asked appropriate questions which I answered to her satisfaction.    RTC: as needed    Chi Magana MD   Hand, Wrist, & Elbow Surgery  [email or web address]         [1]   Allergies  Allergen Reactions    Betamethasone OTHER (SEE COMMENTS)     Dizzy for 1/2 a day - see TE dated 10/18/24    Minocycline      Other reaction(s): Rash    Pollen     Wheat Gluten

## 2024-11-26 NOTE — TELEPHONE ENCOUNTER
PER PT WANT TO KNOW WHAT TYPE OF US DID SHE GET / DONE ON 01/15/18 PLS ADV
Pt calling to ask what type of US she had done on 1/15 and what the exam looks for. Pt informed that she had a pelvic US done and it looks specifically at the ovaries and uterus.  tp stated she continues to have pain with IC and some mild pain on the \"midd
6

## 2024-12-23 ENCOUNTER — OFFICE VISIT (OUTPATIENT)
Dept: ORTHOPEDICS CLINIC | Facility: CLINIC | Age: 39
End: 2024-12-23
Payer: MEDICAID

## 2024-12-23 VITALS — HEIGHT: 62 IN | WEIGHT: 148 LBS | BODY MASS INDEX: 27.23 KG/M2

## 2024-12-23 DIAGNOSIS — M77.8 WRIST TENDONITIS: ICD-10-CM

## 2024-12-23 DIAGNOSIS — M25.831 ULNAR ABUTMENT SYNDROME OF RIGHT WRIST: Primary | ICD-10-CM

## 2024-12-23 PROCEDURE — 99214 OFFICE O/P EST MOD 30 MIN: CPT | Performed by: ORTHOPAEDIC SURGERY

## 2024-12-23 NOTE — H&P
Clinic Note     Assessment/Plan:  39 year old female    Right wrist ulnar-sided wrist pain secondary to ulnar impaction syndrome/ulnar abutment as well as ECU tendinitis/tenosynovitis-a reasonable course of action would be immobilization for 4 to 6 weeks.  We will get the patient into an Exos wrist brace this Friday when we have a figure.  Patient can then subsequently follow-up with me or Dr. Magana after 6 weeks of immobilization to see how she is doing.  If patient fails immobilization surgery would be recommended, I do think as previously recommended wrist arthroscopy TFC debridement and ulnar shortening osteotomy would be indicated.  May or may not need an updated MRI.  Can concurrently with ulnar shortening osteotomy to address ECU tendinitis/synovitis if it still symptomatic after immobilization    Follow Up: After 6 weeks of immobilization    Diagnostic Studies:     XR Right wrist 3 views: 4 to 5 mm of ulnar positive variance.  No chondromalacia of the lunate and or triquetrum    MRI right wrist ([facility]): Central TFC fraying without discrete tear       Physical Exam:     Ht 5' 2\" (1.575 m)   Wt 148 lb (67.1 kg)   LMP 08/24/2024 (Exact Date)   BMI 27.07 kg/m²     Constitutional: NAD. AOx3. Well-developed and Well-nourished.   Psychiatric: Normal mood/ affect/ behavior. Judgment and thought content normal.     Right Upper Extremity:     Inspection    Skin intact. No skin lesions. No obvious mass visualized.    Palpation    Most focally over the ECU tendon and ulnar fovea      ROM    Full composite fist. and Normal symmetric wrist motion.     Neurovascular    Normal sensation in the median, ulnar, and radial nerve distribution. Normal motor function of muscles innervated by median/AIN, ulnar, and radial/PIN nerves.    Normally perfused hand(s).     Special    Ulnar Sided Wrist Exam    Fovea Sign + Piano Key neg   TFC Compression + DRUJ Compression neg   TFC Tension +, Very mild LT Shear neg   ECU Synergy +  Hook Hamate neg   ECU Subluxation neg Pisotriquetral Grind  neg             CC: Right wrist ulnar-sided wrist pain    HPI: This 39 year old RHD female presents with right wrist pain.  Started in 2024.  She initially saw Dr. Bain who performed a corticosteroid injection with minimal minimal improvement in pain.  She is tried a number of nonsurgical things at this point which includes a brace, wrist widget, therapy and anti-inflammatory medication.  Patient has significant pain with simple activities of daily living.    Occupation: Stay-at-home mom    History/Other:   Past Medical History:    Acne    Bipolar II disorder (HCC)    Breast mass, left    Constipation    Depression    Essential hypertension    Fibroadenoma of breast, left    Migraines    Pt denies hx of migraines 4/3/15    Positive TB test    INH    Postconcussion syndrome    PPD positive    Sexually transmitted disease    Trich     Trichomonal vaginitis     Past Surgical History:   Procedure Laterality Date    Breast biopsy Left 2014      2007    Needle biopsy left  2014     Current Outpatient Medications   Medication Sig Dispense Refill    amLODIPine 5 MG Oral Tab Take 1 tablet (5 mg total) by mouth daily. 90 tablet 3    triamcinolone 0.1 % External Cream Apply topically 2 (two) times daily. 80 g 1    clobetasol 0.05 % External Solution APPLY TO SCALP TWICE DAILY AS DIRECTED 50 mL 5    ketoconazole 2 % External Shampoo APPLY TO SCALP TWICE WEEKLY 120 mL 5    Salicylic Acid (KERALYT) 6 % External Gel Apply 1 Application topically every evening. apply by topical route  every day as directed to warts 40 g 3    Ascorbic Acid (VITAMIN C) 100 MG Oral Tab Take by mouth.      MULTIPLE VITAMINS-MINERALS ER OR Take by mouth.      Cholecalciferol (VITAMIN D) 125 MCG (5000 UT) Oral Cap Take by mouth.      Drospirenone (SLYND) 4 MG Oral Tab Take 1 tablet by mouth daily. (Patient not taking: Reported on 2024) 28 tablet 12    acyclovir 5 %  External Ointment Please apply ointment on affected area  every 3 hours while awake for 5 days (Patient not taking: Reported on 12/23/2024) 30 g 1    acyclovir 400 MG Oral Tab Take 1 tablet (400 mg total) by mouth 3 (three) times daily. for 5 days (Patient not taking: Reported on 12/23/2024) 15 tablet 0    Adapalene-Benzoyl Peroxide (EPIDUO) 0.1-2.5 % External Gel Use bid to affected areas of face, chest, back as directed (Patient not taking: Reported on 12/23/2024) 45 g 0     Allergies[1]  Family History   Problem Relation Age of Onset    Infectious Disease Father         tuberculosis    Asthma Father      Social History     Occupational History    Not on file   Tobacco Use    Smoking status: Never     Passive exposure: Never    Smokeless tobacco: Never   Vaping Use    Vaping status: Never Used   Substance and Sexual Activity    Alcohol use: No    Drug use: No     Comment: None.     Sexual activity: Yes     Partners: Male          Review of Systems (negative unless bolded):  General: fevers, chills, fatigue  CV:  chest pain, palpitations, leg swelling  Msk: bodyaches, neck pain, neck stiffness  Skin: rashes, open wounds, nonhealing ulcers  Hem: bleeds easily, bruise easily, immunocompromised  Neuro: dizziness, light headedness, headaches  Psych: anxious, depressed, anger issues      Heriberto Bull MD   Hand, Wrist, & Elbow Surgery  [email or web address]  t: [phone]  f: [phone]       [1]   Allergies  Allergen Reactions    Betamethasone OTHER (SEE COMMENTS)     Dizzy for 1/2 a day - see TE dated 10/18/24    Minocycline      Other reaction(s): Rash    Pollen     Wheat Gluten

## 2024-12-24 ENCOUNTER — HOSPITAL ENCOUNTER (OUTPATIENT)
Age: 39
Discharge: HOME | End: 2024-12-24
Payer: MEDICAID

## 2024-12-24 VITALS
DIASTOLIC BLOOD PRESSURE: 95 MMHG | OXYGEN SATURATION: 98 % | HEART RATE: 85 BPM | TEMPERATURE: 99 F | SYSTOLIC BLOOD PRESSURE: 135 MMHG | RESPIRATION RATE: 18 BRPM

## 2024-12-24 DIAGNOSIS — H60.501 ACUTE OTITIS EXTERNA OF RIGHT EAR, UNSPECIFIED TYPE: Primary | ICD-10-CM

## 2024-12-24 PROCEDURE — 99213 OFFICE O/P EST LOW 20 MIN: CPT

## 2024-12-24 RX ORDER — OFLOXACIN 3 MG/ML
5 SOLUTION AURICULAR (OTIC) 2 TIMES DAILY
Qty: 5 ML | Refills: 0 | Status: SHIPPED | OUTPATIENT
Start: 2024-12-24 | End: 2024-12-31

## 2024-12-24 NOTE — ED PROVIDER NOTES
Patient Seen in: [facility] [place]      History     Chief Complaint   Patient presents with    Ear Problem Pain     Stated Complaint: Ear Pain  Subjective:   Natividad is a 39-year-old female presenting to the immediate care complaining of right ear pain.  Patient states that for the past 3 days she has had pain to the right ear.  States that she has been putting Q-tips in her ear but it persists.  She also has mild itching to the right ear canal.  Denies any pain in the left ear.  Denies any history of cerumen impaction/buildup.  States that last year she had a similar episode to the right ear the injury or trauma.  She has not had any drainage.  She has not had any fever.  Denies any URI complaints.  She is otherwise feeling well.  She has no other complaints or concerns.        Objective:   Past Medical History:    Acne    Bipolar II disorder (HCC)    Breast mass, left    Constipation    Depression    Essential hypertension    Fibroadenoma of breast, left    Migraines    Pt denies hx of migraines 4/3/15    Positive TB test    INH    Postconcussion syndrome    PPD positive    Sexually transmitted disease    Trich     Trichomonal vaginitis            Past Surgical History:   Procedure Laterality Date    Breast biopsy Left 2014      2007    Needle biopsy left  2014              Social History     Socioeconomic History    Marital status:    Tobacco Use    Smoking status: Never     Passive exposure: Never    Smokeless tobacco: Never   Vaping Use    Vaping status: Never Used   Substance and Sexual Activity    Alcohol use: No    Drug use: No     Comment: None.     Sexual activity: Yes     Partners: Male   Other Topics Concern    Caffeine Concern No    Exercise No    Pt has a pacemaker No    Pt has a defibrillator No    Breast feeding No    Reaction to local anesthetic No   Social History Narrative    The patient does not use an assistive device..      The patient does live in a home with  stairs.     Social Drivers of Health      Received from [facility], [facility]    Housing Stability            Review of Systems    Positive for stated complaint: Ear Problem Pain    Other systems are as noted in HPI.  Constitutional and vital signs reviewed.      All other systems reviewed and negative except as noted above.    Physical Exam     ED Triage Vitals [12/24/24 1518]   BP (!) 135/95   Pulse 85   Resp 18   Temp 98.8 °F (37.1 °C)   Temp src Oral   SpO2 98 %   O2 Device None (Room air)     Current:BP (!) 135/95   Pulse 85   Temp 98.8 °F (37.1 °C) (Oral)   Resp 18   LMP 08/24/2024 (Exact Date)   SpO2 98%     Physical Exam  Vitals and nursing note reviewed.   Constitutional:       General: She is not in acute distress.     Appearance: Normal appearance. She is not ill-appearing, toxic-appearing or diaphoretic.   HENT:      Head: Normocephalic.      Right Ear: Tympanic membrane, ear canal and external ear normal. No decreased hearing noted. Swelling and tenderness present. No laceration or drainage. No middle ear effusion. There is no impacted cerumen. No foreign body. No mastoid tenderness.      Left Ear: Tympanic membrane, ear canal and external ear normal. No tenderness. No mastoid tenderness.      Ears:      Comments: Patient has swelling and irritation to the right ear canal with tenderness on exam.  She also has some mild tragus tenderness.  No mastoid tenderness.  No drainage.     Nose: Nose normal.      Mouth/Throat:      Mouth: Mucous membranes are moist.      Pharynx: Oropharynx is clear.   Eyes:      Conjunctiva/sclera: Conjunctivae normal.   Cardiovascular:      Rate and Rhythm: Normal rate and regular rhythm.      Pulses: Normal pulses.      Heart sounds: Normal heart sounds.   Pulmonary:      Effort: Pulmonary effort is normal. No respiratory distress.      Breath sounds: Normal breath sounds. No stridor. No wheezing, rhonchi or rales.   Abdominal:       General: Abdomen is flat.   Musculoskeletal:         General: Normal range of motion.      Cervical back: Normal range of motion.   Skin:     General: Skin is warm.      Capillary Refill: Capillary refill takes less than 2 seconds.   Neurological:      General: No focal deficit present.      Mental Status: She is alert and oriented to person, place, and time.   Psychiatric:         Mood and Affect: Mood normal.         Behavior: Behavior normal.         Thought Content: Thought content normal.         Judgment: Judgment normal.         ED Course   Radiology:    No orders to display     Labs Reviewed - No data to display    MDM     Medical Decision Making  Differential diagnoses reflecting the complexity of care include but are not limited to otitis media, otitis externa, otalgia, less likely mastoiditis.    Comorbidities that add complexity to management include: None  History obtained by an independent source was from: Patient  Patient is well appearing, non-toxic and in no acute distress.  Vital signs are stable.     History and physical exam are consistent with otitis externa.  Prescribed ofloxacin antibiotic drops for otitis externa.  Recommended using tylenol or motrin for pain as needed.  Recommended that if the patient develops any pain, redness or swelling around your ear you need to go to the ED. Recommended that if the patient develops a fever or any other worsening or concerning symptoms they should go to the ED.  Recommended that if symptoms do not improve with antibiotic drops the patient should follow up with the Ear Nose and throat specialist. Otherwise recommended follow up with primary care doctor.     ED precautions discussed.  Patient (guardian) advised to follow up with PCP in 2-3 days.  Patient (guardian) agrees with this plan of care.  Patient (guardian) verbalizes understanding of discharge instructions and plan of care.      Risk  OTC drugs.  Prescription drug management.        Disposition  and Plan     Clinical Impression:  1. Acute otitis externa of right ear, unspecified type         Disposition:  Discharge  12/24/2024  3:34 pm    Follow-up:  Paras Montaño MD  [address] [address]  [phone]          Haroon Demarco MD  [address] [place] [address]  [phone]                Medications Prescribed:  Discharge Medication List as of 12/24/2024  3:34 PM        START taking these medications    Details   ofloxacin 0.3 % Otic Solution Place 5 drops in ear(s) 2 (two) times daily for 7 days., Normal, Disp-5 mL, R-0

## 2024-12-30 ENCOUNTER — TELEPHONE (OUTPATIENT)
Dept: ORTHOPEDICS CLINIC | Facility: CLINIC | Age: 39
End: 2024-12-30

## 2024-12-30 NOTE — TELEPHONE ENCOUNTER
Please contact patient to help her adjust brace.   Exos Cast placed 12/27/24  She is complaining of discomfort.  - she loosened it up and would like help re tightening the brace.     LOV: 12/23/24    Per LOV: \"Right wrist ulnar-sided wrist pain secondary to ulnar impaction syndrome/ulnar abutment as well as ECU tendinitis/tenosynovitis-a reasonable course of action would be immobilization for 4 to 6 weeks.  We will get the patient into an Exos wrist brace this Friday when we have a figure.  Patient can then subsequently follow-up with me or Dr. Magana after 6 weeks of immobilization to see how she is doing.  If patient fails immobilization surgery would be recommended, I do think as previously recommended wrist arthroscopy TFC debridement and ulnar shortening osteotomy would be indicated.  May or may not need an updated MRI.  Can concurrently with ulnar shortening osteotomy to address ECU tendinitis/synovitis if it still symptomatic after immobilization     Follow Up: After 6 weeks of immobilization\"

## 2024-12-31 ENCOUNTER — TELEPHONE (OUTPATIENT)
Facility: CLINIC | Age: 39
End: 2024-12-31

## 2024-12-31 NOTE — TELEPHONE ENCOUNTER
Pt has questions about what over the counter medications she is able to take, please advise. Contact [phone]  Future Appointments  2/10/2025  9:30 AM    [facility]          [id number]

## 2024-12-31 NOTE — TELEPHONE ENCOUNTER
Spoke with patient who wanted to know if it is okay to take ibuprofen because she is in pain with her period cramps.  Discussed with patient that it is okay to take ibuprofen, she stated she only takes 400mg a couple of times a day..  No other questions or concerns.

## 2025-01-06 ENCOUNTER — TELEPHONE (OUTPATIENT)
Facility: CLINIC | Age: 40
End: 2025-01-06

## 2025-01-06 NOTE — TELEPHONE ENCOUNTER
Patient states she will come in today before 3pm to [place] to have the brace put back on and to get another sleeve.

## 2025-01-06 NOTE — TELEPHONE ENCOUNTER
Patient states she took off her wrist brace to clean it and was not able to get it back on correctly. Patient states she also had 2 sleeves to go under it and believes she lost one.   Please advise.

## 2025-01-06 NOTE — TELEPHONE ENCOUNTER
Patient can come in today to at the [place] office to get assistance with putting her brace back on and another sleeve. Thanks

## 2025-01-12 NOTE — PATIENT INSTRUCTIONS
Plan  amoxicillin clavulanate 875-125 MG Oral Tab Take 1 tablet by mouth 2 (two) times daily for 10 days.  20 tablet   Apply warm compresses to infected ear     Drink water with lemon 114

## 2025-01-20 ENCOUNTER — TELEPHONE (OUTPATIENT)
Dept: DERMATOLOGY CLINIC | Facility: CLINIC | Age: 40
End: 2025-01-20

## 2025-01-20 NOTE — TELEPHONE ENCOUNTER
LOV 9/05/2024, pt using tretinoin 0.1% cream but it's too drying even with moisturizer and spacing it out. Wants to now if we can send a less potent one? Please advise

## 2025-01-21 RX ORDER — TRETINOIN 0.25 MG/G
CREAM TOPICAL
Qty: 20 G | Refills: 3 | Status: SHIPPED | OUTPATIENT
Start: 2025-01-21

## 2025-01-21 NOTE — TELEPHONE ENCOUNTER
0.025% cream sent in place of the 0.1 for the winter.  If less dry may alternate this in the 0.1 would just use a 0.025 every other day for now

## 2025-02-05 ENCOUNTER — PATIENT MESSAGE (OUTPATIENT)
Dept: ORTHOPEDICS CLINIC | Facility: CLINIC | Age: 40
End: 2025-02-05

## 2025-02-06 NOTE — TELEPHONE ENCOUNTER
LOV 12/23/24  \"Patient can then subsequently follow-up with me or Dr. Magana after 6 weeks of immobilization to see how she is doing. \"  Exos cast placed 12/27/24    Pt needs to reschedule her Monday appointment due to family emergency.    Can either of you see her after 1pm Monday 2/10?

## 2025-02-08 NOTE — TELEPHONE ENCOUNTER
Refill Request for medication(s):     Last Office Visit: 9/5/24    Last Refill: 9/12/24    Pharmacy, Dosage verified: yes    Condition Update (if applicable):     Rx pended and sent to provider for approval, please advise. Thank You!

## 2025-02-10 ENCOUNTER — OFFICE VISIT (OUTPATIENT)
Dept: ORTHOPEDICS CLINIC | Facility: CLINIC | Age: 40
End: 2025-02-10
Payer: MEDICAID

## 2025-02-10 VITALS — WEIGHT: 158 LBS | BODY MASS INDEX: 29.08 KG/M2 | HEIGHT: 62 IN

## 2025-02-10 DIAGNOSIS — M77.8 WRIST TENDONITIS: ICD-10-CM

## 2025-02-10 DIAGNOSIS — M25.831 ULNAR ABUTMENT SYNDROME OF RIGHT WRIST: Primary | ICD-10-CM

## 2025-02-10 PROCEDURE — 99213 OFFICE O/P EST LOW 20 MIN: CPT | Performed by: ORTHOPAEDIC SURGERY

## 2025-02-10 RX ORDER — TRIAMCINOLONE ACETONIDE 1 MG/G
1 CREAM TOPICAL 2 TIMES DAILY
Qty: 80 G | Refills: 1 | Status: SHIPPED | OUTPATIENT
Start: 2025-02-10

## 2025-02-10 NOTE — PROGRESS NOTES
Clinic Note     Assessment/Plan:  39 year old female    Right wrist ulnar-sided wrist pain secondary to ulnar impaction syndrome/ulnar abutment as well as ECU tendinitis/tenosynovitis-patient will begin a course of therapy to recover motion and strength.  If her symptoms recur surgery would be recommended.  Surgery would likely entail a wrist arthroscopy TFC debridement and ulnar shortening osteotomy would be indicated with possible ECU tenosynovectomy.  As of right now a repeat MRI prior to surgery would not be needed    Follow Up: 2 months    Diagnostic Studies:     XR Right wrist 3 views: 4 to 5 mm of ulnar positive variance.  No chondromalacia of the lunate and or triquetrum    MRI right wrist (OSH): Central TFC fraying without discrete tear       Physical Exam:     Ht 5' 2\" (1.575 m)   Wt 158 lb (71.7 kg)   LMP 08/24/2024 (Exact Date)   BMI 28.90 kg/m²     Constitutional: NAD. AOx3. Well-developed and Well-nourished.   Psychiatric: Normal mood/ affect/ behavior. Judgment and thought content normal.     Right Upper Extremity:     Inspection    Skin intact. No skin lesions. No obvious mass visualized.    Palpation    Decreased tenderness over the ECU and ulnar fovea      ROM    Full composite fist. and Normal symmetric wrist motion.     Neurovascular    Normal sensation in the median, ulnar, and radial nerve distribution. Normal motor function of muscles innervated by median/AIN, ulnar, and radial/PIN nerves.    Normally perfused hand(s).     Special    Ulnar Sided Wrist Exam  Prior to immobilization  Fovea Sign + Piano Key neg   TFC Compression + DRUJ Compression neg   TFC Tension +, Very mild LT Shear neg   ECU Synergy + Hook Hamate neg   ECU Subluxation neg Pisotriquetral Grind  neg             CC: Right wrist ulnar-sided wrist pain    HPI: This 39 year old RHD female presents with right wrist pain.  Started in June 2024.  She initially saw Dr. Bain who performed a corticosteroid injection with minimal  minimal improvement in pain.  She is tried a number of nonsurgical things at this point which includes a brace, wrist widget, therapy and anti-inflammatory medication.  Patient has significant pain with simple activities of daily living.    Interval Hx (2/10/2025): Patient is tolerated bracing without any issues.    Occupation: Stay-at-home mom    History/Other:   Past Medical History:    Acne    Bipolar II disorder (HCC)    Breast mass, left    Constipation    Depression    Essential hypertension    Fibroadenoma of breast, left    Migraines    Pt denies hx of migraines 4/3/15    Positive TB test    INH    Postconcussion syndrome    PPD positive    Sexually transmitted disease    Trich     Trichomonal vaginitis     Past Surgical History:   Procedure Laterality Date    Breast biopsy Left 2014      2007    Needle biopsy left  2014     Current Outpatient Medications   Medication Sig Dispense Refill    TRIAMCINOLONE 0.1 % External Cream APPLY TOPICALLY TO THE AFFECTED AREA TWICE DAILY 80 g 1    tretinoin 0.025 % External Cream Apply at bedtime for acne 20 g 3    amLODIPine 5 MG Oral Tab Take 1 tablet (5 mg total) by mouth daily. 90 tablet 3    clobetasol 0.05 % External Solution APPLY TO SCALP TWICE DAILY AS DIRECTED 50 mL 5    ketoconazole 2 % External Shampoo APPLY TO SCALP TWICE WEEKLY 120 mL 5    Salicylic Acid (KERALYT) 6 % External Gel Apply 1 Application topically every evening. apply by topical route  every day as directed to warts 40 g 3    Ascorbic Acid (VITAMIN C) 100 MG Oral Tab Take by mouth.      MULTIPLE VITAMINS-MINERALS ER OR Take by mouth.      Cholecalciferol (VITAMIN D) 125 MCG (5000 UT) Oral Cap Take by mouth.      Drospirenone (SLYND) 4 MG Oral Tab Take 1 tablet by mouth daily. (Patient not taking: Reported on 2024) 28 tablet 12    acyclovir 5 % External Ointment Please apply ointment on affected area  every 3 hours while awake for 5 days (Patient not taking: Reported on  9/27/2024) 30 g 1    acyclovir 400 MG Oral Tab Take 1 tablet (400 mg total) by mouth 3 (three) times daily. for 5 days (Patient not taking: Reported on 9/27/2024) 15 tablet 0    Adapalene-Benzoyl Peroxide (EPIDUO) 0.1-2.5 % External Gel Use bid to affected areas of face, chest, back as directed (Patient not taking: Reported on 9/27/2024) 45 g 0     Allergies[1]  Family History   Problem Relation Age of Onset    Infectious Disease Father         tuberculosis    Asthma Father      Social History     Occupational History    Not on file   Tobacco Use    Smoking status: Never     Passive exposure: Never    Smokeless tobacco: Never   Vaping Use    Vaping status: Never Used   Substance and Sexual Activity    Alcohol use: No    Drug use: No     Comment: None.     Sexual activity: Yes     Partners: Male          Review of Systems (negative unless bolded):  General: fevers, chills, fatigue  CV:  chest pain, palpitations, leg swelling  Msk: bodyaches, neck pain, neck stiffness  Skin: rashes, open wounds, nonhealing ulcers  Hem: bleeds easily, bruise easily, immunocompromised  Neuro: dizziness, light headedness, headaches  Psych: anxious, depressed, anger issues      Heriberto Bull MD   Hand, Wrist, [facility] Elbow Surgery  [email or web address]  t: [phone]  f: [phone]         [1]   Allergies  Allergen Reactions    Betamethasone OTHER (SEE COMMENTS)     Dizzy for 1/2 a day - see TE dated 10/18/24    Minocycline      Other reaction(s): Rash    Pollen     Wheat Gluten

## 2025-02-11 ENCOUNTER — TELEPHONE (OUTPATIENT)
Dept: ORTHOPEDICS CLINIC | Facility: CLINIC | Age: 40
End: 2025-02-11

## 2025-02-11 NOTE — TELEPHONE ENCOUNTER
Patient hard her cast removed yesterday by Dr. Bull and forgot to ask if she can return back to the gym to do the treadmill/sauna.

## 2025-02-11 NOTE — TELEPHONE ENCOUNTER
Question: Can she return to sauna/treadmill use?   LOV: 2/10/25  Ulnar abutment syndrome /wrist tendonitis  Per LOV: \"  Right wrist ulnar-sided wrist pain secondary to ulnar impaction syndrome/ulnar abutment as well as ECU tendinitis/tenosynovitis-patient will begin a course of therapy to recover motion and strength.  If her symptoms recur surgery would be recommended.  Surgery would likely entail a wrist arthroscopy TFC debridement and ulnar shortening osteotomy would be indicated with possible ECU tenosynovectomy.  As of right now a repeat MRI prior to surgery would not be needed  Follow Up: 2 months\"

## 2025-02-18 ENCOUNTER — PATIENT MESSAGE (OUTPATIENT)
Dept: ORTHOPEDICS CLINIC | Facility: CLINIC | Age: 40
End: 2025-02-18

## 2025-02-18 NOTE — TELEPHONE ENCOUNTER
Note pended for review- please advise if we can sign and send~ Thanks!    LOV 02/05/2025~    Right wrist ulnar-sided wrist pain secondary to ulnar impaction syndrome/ulnar abutment as well as ECU tendinitis/tenosynovitis-patient will begin a course of therapy to recover motion and strength.  If her symptoms recur surgery would be recommended.  Surgery would likely entail a wrist arthroscopy TFC debridement and ulnar shortening osteotomy would be indicated with possible ECU tenosynovectomy.

## 2025-02-28 ENCOUNTER — MED REC SCAN ONLY (OUTPATIENT)
Dept: ORTHOPEDICS CLINIC | Facility: CLINIC | Age: 40
End: 2025-02-28

## 2025-03-04 ENCOUNTER — TELEPHONE (OUTPATIENT)
Dept: DERMATOLOGY CLINIC | Facility: CLINIC | Age: 40
End: 2025-03-04

## 2025-03-04 NOTE — TELEPHONE ENCOUNTER
Dr. Leo pt seen by you on 12/13/23, Eladia on 9/5/24 - please see her msg, I do see you refilled her triamcinolone last month. Is there anything you want to recommend for her eyelids or have her see you on Eladia first?

## 2025-03-04 NOTE — PATIENT INSTRUCTIONS
Please try to avoid painful activity and apply heat to the affected area 3 times daily. Take ibuprofen 400-600 mg up to 3 times daily with food. Take cyclobenzaprine 10 mg 3 times daily as needed, watching for drowsiness. Call if no better. I would recommend that you schedule your next CT scan as ordered by Dr. Javier Paiz.
No indicators present

## 2025-03-05 NOTE — TELEPHONE ENCOUNTER
S/w Pt. Relayed Dr. Leo notes. Pt was advised to contact us if there has been no improvement after few weeks. Pt verbalized understanding and had no further questions/concerns.

## 2025-03-07 ENCOUNTER — MED REC SCAN ONLY (OUTPATIENT)
Dept: ORTHOPEDICS CLINIC | Facility: CLINIC | Age: 40
End: 2025-03-07

## 2025-03-17 ENCOUNTER — PATIENT MESSAGE (OUTPATIENT)
Dept: ORTHOPEDICS CLINIC | Facility: CLINIC | Age: 40
End: 2025-03-17

## 2025-03-17 DIAGNOSIS — M77.8 RIGHT WRIST TENDONITIS: ICD-10-CM

## 2025-03-17 DIAGNOSIS — M25.831 ULNAR ABUTMENT SYNDROME OF RIGHT WRIST: Primary | ICD-10-CM

## 2025-03-24 NOTE — TELEPHONE ENCOUNTER
Refill Request for medication(s):     Last Office Visit: 09/05/2024    Last Refill: 12/2023    Pharmacy, Dosage verified: yes    Condition Update (if applicable):     Rx pended and sent to provider for approval, please advise. Thank You!

## 2025-03-25 RX ORDER — SALICYLIC ACID 60 MG/G
1 GEL TOPICAL EVERY EVENING
Qty: 40 G | Refills: 3 | Status: SHIPPED | OUTPATIENT
Start: 2025-03-25

## 2025-03-25 RX ORDER — KETOCONAZOLE 20 MG/ML
SHAMPOO, SUSPENSION TOPICAL
Qty: 120 ML | Refills: 5 | Status: SHIPPED | OUTPATIENT
Start: 2025-03-25

## 2025-04-11 ENCOUNTER — OFFICE VISIT (OUTPATIENT)
Dept: ORTHOPEDICS CLINIC | Facility: CLINIC | Age: 40
End: 2025-04-11
Payer: MEDICAID

## 2025-04-11 VITALS — WEIGHT: 158 LBS | BODY MASS INDEX: 29.08 KG/M2 | HEIGHT: 62 IN

## 2025-04-11 DIAGNOSIS — M25.831 ULNAR ABUTMENT SYNDROME OF RIGHT WRIST: Primary | ICD-10-CM

## 2025-04-11 DIAGNOSIS — S69.81XA INJURY OF TRIANGULAR FIBROCARTILAGE COMPLEX (TFCC) OF RIGHT WRIST, INITIAL ENCOUNTER: ICD-10-CM

## 2025-04-11 PROCEDURE — 99214 OFFICE O/P EST MOD 30 MIN: CPT | Performed by: ORTHOPAEDIC SURGERY

## 2025-04-11 NOTE — PROGRESS NOTES
Clinic Note     Assessment/Plan:  39 year old female    Right wrist ulnar-sided wrist pain secondary to ulnar impaction syndrome/ulnar abutment as well as ECU tendinitis/tenosynovitis-continues to have significant pain and secondarily functional limitations.  Symptoms are significant enough for the patient that she elected to proceed with surgery.  Surgery would likely entail a wrist arthroscopy TFC debridement and ulnar shortening osteotomy and ECU tenosynovectomy.  As of right now a repeat MRI prior to surgery would not be needed.  Patient will call back to schedule surgery at a mutually available time.    Follow Up: TBD    Diagnostic Studies:     XR Right wrist 3 views: 4 to 5 mm of ulnar positive variance.  No chondromalacia of the lunate and or triquetrum    MRI right wrist (OSH): Central TFC fraying without discrete tear       Physical Exam:     Ht 5' 2\" (1.575 m)   Wt 158 lb (71.7 kg)   LMP 08/24/2024 (Exact Date)   BMI 28.90 kg/m²     Constitutional: NAD. AOx3. Well-developed and Well-nourished.   Psychiatric: Normal mood/ affect/ behavior. Judgment and thought content normal.     Right Upper Extremity:     Inspection    Skin intact. No skin lesions. No obvious mass visualized.    Palpation    tenderness over the ECU and ulnar fovea      ROM    Full composite fist. and Normal symmetric wrist motion.     Neurovascular    Normal sensation in the median, ulnar, and radial nerve distribution. Normal motor function of muscles innervated by median/AIN, ulnar, and radial/PIN nerves.    Normally perfused hand(s).     Special    Ulnar Sided Wrist Exam  Status post immobilization  Fovea Sign + Piano Key neg   TFC Compression + DRUJ Compression neg   TFC Tension +, Very mild LT Shear neg   ECU Synergy + Hook Hamate neg   ECU Subluxation neg Pisotriquetral Grind  neg             CC: Right wrist ulnar-sided wrist pain    HPI: This 39 year old RHD female presents with right wrist pain.  Started in June 2024.  She  initially saw Dr. Bain who performed a corticosteroid injection with minimal minimal improvement in pain.  She is tried a number of nonsurgical things at this point which includes a brace, wrist widget, therapy and anti-inflammatory medication.  Patient has significant pain with simple activities of daily living.    Interval Hx (2/10/2025): Patient is tolerated bracing without any issues.    Interval Hx (2025): Patient continues to have ulnar-sided wrist pain despite immobilization.    Occupation: Stay-at-home mom    History/Other:   Past Medical History:    Acne    Bipolar II disorder (HCC)    Breast mass, left    Constipation    Depression    Essential hypertension    Fibroadenoma of breast, left    Migraines    Pt denies hx of migraines 4/3/15    Positive TB test    INH    Postconcussion syndrome    PPD positive    Sexually transmitted disease    Trich     Trichomonal vaginitis     Past Surgical History:   Procedure Laterality Date    Breast biopsy Left 2014      2007    Needle biopsy left  2014     Current Outpatient Medications   Medication Sig Dispense Refill    Salicylic Acid 6 % External Gel Apply 1 Application topically every evening. apply by topical route  every day as directed to warts 40 g 3    ketoconazole 2 % External Shampoo APPLY TOPICALLY TO THE SCALP 2 TIMES A WEEK 120 mL 5    neomycin-polymyxin-dexamethasone 3.5-80962-6.1 Ophthalmic Ointment Apply bid to eyelid eczema as directed 3.5 g 3    TRIAMCINOLONE 0.1 % External Cream APPLY TOPICALLY TO THE AFFECTED AREA TWICE DAILY 80 g 1    tretinoin 0.025 % External Cream Apply at bedtime for acne 20 g 3    amLODIPine 5 MG Oral Tab Take 1 tablet (5 mg total) by mouth daily. 90 tablet 3    clobetasol 0.05 % External Solution APPLY TO SCALP TWICE DAILY AS DIRECTED 50 mL 5    Ascorbic Acid (VITAMIN C) 100 MG Oral Tab Take by mouth.      MULTIPLE VITAMINS-MINERALS ER OR Take by mouth.      Cholecalciferol (VITAMIN D) 125 MCG (5000  UT) Oral Cap Take by mouth.      Drospirenone (SLYND) 4 MG Oral Tab Take 1 tablet by mouth daily. (Patient not taking: Reported on 4/11/2025) 28 tablet 12    acyclovir 5 % External Ointment Please apply ointment on affected area  every 3 hours while awake for 5 days (Patient not taking: Reported on 4/11/2025) 30 g 1    acyclovir 400 MG Oral Tab Take 1 tablet (400 mg total) by mouth 3 (three) times daily. for 5 days (Patient not taking: Reported on 4/11/2025) 15 tablet 0    Adapalene-Benzoyl Peroxide (EPIDUO) 0.1-2.5 % External Gel Use bid to affected areas of face, chest, back as directed (Patient not taking: Reported on 4/11/2025) 45 g 0     Allergies[1]  Family History   Problem Relation Age of Onset    Infectious Disease Father         tuberculosis    Asthma Father      Social History     Occupational History    Not on file   Tobacco Use    Smoking status: Never     Passive exposure: Never    Smokeless tobacco: Never   Vaping Use    Vaping status: Never Used   Substance and Sexual Activity    Alcohol use: No    Drug use: No     Comment: None.     Sexual activity: Yes     Partners: Male          Review of Systems (negative unless bolded):  General: fevers, chills, fatigue  CV:  chest pain, palpitations, leg swelling  Msk: bodyaches, neck pain, neck stiffness  Skin: rashes, open wounds, nonhealing ulcers  Hem: bleeds easily, bruise easily, immunocompromised  Neuro: dizziness, light headedness, headaches  Psych: anxious, depressed, anger issues      Heriberto Bull MD   [facility] W[facility], [facility] Elbow Surgery  [email or web address]  t: [phone]  f: [phone]         [1]   Allergies  Allergen Reactions    Betamethasone OTHER (SEE COMMENTS)     Dizzy for 1/2 a day - see TE dated 10/18/24    Minocycline      Other reaction(s): Rash    Pollen     Wheat Gluten

## 2025-04-17 ENCOUNTER — TELEPHONE (OUTPATIENT)
Dept: INTERNAL MEDICINE CLINIC | Facility: CLINIC | Age: 40
End: 2025-04-17

## 2025-04-17 ENCOUNTER — NURSE TRIAGE (OUTPATIENT)
Dept: INTERNAL MEDICINE CLINIC | Facility: CLINIC | Age: 40
End: 2025-04-17

## 2025-04-17 NOTE — TELEPHONE ENCOUNTER
Patient calling for refill.     Sulfameth-trimethoprim 800-160mgtb; 1 tablet by mouth daily as needed.     This medication is not on the list of current medications. Mariano/Elijah instructed patient to call the provider/PCP to prescribe.     Please call patient  if more information is needed; [phone].     [id number] - [place] [address] [facility] [place], [phone], [phone] [[id number]]

## 2025-04-17 NOTE — TELEPHONE ENCOUNTER
Patient contacted and assessed for symptoms related to antibiotic request--> see RN Triage from today. Declined scheduling advised physical at this time and will schedule at a later date when her schedule is available. Visit was scheduled for acute visit.    Future Appointments   Date Time Provider Department Center   4/21/2025  8:00 AM Maddie Michelle APRN [facility] [place]

## 2025-04-17 NOTE — TELEPHONE ENCOUNTER
Action Requested: Summary for Provider     []  Critical Lab, Recommendations Needed  [] Need Additional Advice  [x]   FYI    []   Need Orders  [] Need Medications Sent to Pharmacy  []  Other     SUMMARY: small \"boil\" about the size of a lentil at he back of her head by the nape of neck that is tender to touch, otherwise not painful. Denies any drainage. Patient reports recurrent boil a site and requested antibiotics. Visit advised and scheduled on Monday, 4/21/25 [declined sooner visit]. [See below for more details]     Reason for call: Abscess  Onset: last week, 4/11/25    Reason for Disposition   Boil and not improved > 3 days following CARE ADVICE    Protocols used: Boil (Skin Abscess)-A-OH      Patient contacted, she states she has a boil of her head at the back of head by the nape of neck, same area as prior in 2023. Denies fever. She states the site is swollen and the size of a lentil. She tried applying the tree oil to site. Denies any drainage. Painful to touch. Visit advised for requested medication, preferably within 3 days and offered--> declined offer within 3 days related to other obligations and was agreeable to visit on Monday 4/21/25--> scheduled. Home Care Advice discussed, per protocol. Patient instructed any new or worsening symptoms [reviewed] seek immediate medical attention-->Immediate Care. Patient verbalized understanding. No further questions or concerns at this time.    Future Appointments   Date Time Provider Department Center   4/21/2025  8:00 AM Maddie Michelle APRN [facility]

## 2025-07-11 ENCOUNTER — TELEPHONE (OUTPATIENT)
Dept: INTERNAL MEDICINE CLINIC | Facility: CLINIC | Age: 40
End: 2025-07-11

## 2025-07-11 NOTE — TELEPHONE ENCOUNTER
Agree with RN below, however pt should be seen as I did not see her recently/did not provide medical advice

## 2025-07-11 NOTE — TELEPHONE ENCOUNTER
To Joshua IQBAL    Please advise. Should patient restart prescription that were abruptly stopped?Thank you    Patient calling (verified full name and date of birth).  Stated she was treated at   6-27-25 fro complaints of sore throat  Tested negative for streph and told it was viral  Prescribed Azithromycin and Prednisone  Patient stated she stopped taking both prescription after 3 days since she was feeling better,but sore throat has now returned  I educated patient that such prescriptions need to be completed and not abruptly stopped as she may have a rebound of symptom effect  Has 3 days left on her Azithromycin and 2 days left on her prednisone  Has appointment scheduled with you as below    Future Appointments   Date Time Provider Department Center   7/19/2025 10:40 AM Gisel Lewis APRN [id number] [place]

## 2025-07-11 NOTE — TELEPHONE ENCOUNTER
Patient notified with understanding.  States she restarted her medication.  Nurse did offer patient an appointment today which she is uable to accept.  She will monitor her symptoms this weekend and report back to immediate care for any progression.

## 2025-07-19 ENCOUNTER — LAB ENCOUNTER (OUTPATIENT)
Dept: LAB | Age: 40
End: 2025-07-19
Attending: NURSE PRACTITIONER
Payer: MEDICAID

## 2025-07-19 ENCOUNTER — OFFICE VISIT (OUTPATIENT)
Dept: INTERNAL MEDICINE CLINIC | Facility: CLINIC | Age: 40
End: 2025-07-19
Payer: MEDICAID

## 2025-07-19 VITALS
HEIGHT: 62 IN | TEMPERATURE: 97 F | OXYGEN SATURATION: 99 % | SYSTOLIC BLOOD PRESSURE: 130 MMHG | WEIGHT: 163 LBS | DIASTOLIC BLOOD PRESSURE: 92 MMHG | BODY MASS INDEX: 30 KG/M2 | HEART RATE: 83 BPM

## 2025-07-19 DIAGNOSIS — R09.82 POST-NASAL DRAINAGE: ICD-10-CM

## 2025-07-19 DIAGNOSIS — Z00.00 WELLNESS EXAMINATION: Primary | ICD-10-CM

## 2025-07-19 DIAGNOSIS — Z13.0 SCREENING FOR DEFICIENCY ANEMIA: ICD-10-CM

## 2025-07-19 DIAGNOSIS — Z13.29 THYROID DISORDER SCREEN: ICD-10-CM

## 2025-07-19 DIAGNOSIS — Z00.00 WELLNESS EXAMINATION: ICD-10-CM

## 2025-07-19 DIAGNOSIS — Z13.220 LIPID SCREENING: ICD-10-CM

## 2025-07-19 DIAGNOSIS — I10 PRIMARY HYPERTENSION: ICD-10-CM

## 2025-07-19 DIAGNOSIS — D24.9 FIBROADENOMA OF BREAST, UNSPECIFIED LATERALITY: ICD-10-CM

## 2025-07-19 DIAGNOSIS — S16.1XXA NECK STRAIN, INITIAL ENCOUNTER: ICD-10-CM

## 2025-07-19 LAB
ALBUMIN SERPL-MCNC: 4.4 G/DL (ref 3.2–4.8)
ALBUMIN/GLOB SERPL: 1.7 {RATIO} (ref 1–2)
ALP LIVER SERPL-CCNC: 41 U/L (ref 37–98)
ALT SERPL-CCNC: 14 U/L (ref 10–49)
ANION GAP SERPL CALC-SCNC: 9 MMOL/L (ref 0–18)
AST SERPL-CCNC: 16 U/L (ref ?–34)
BASOPHILS # BLD AUTO: 0.05 X10(3) UL (ref 0–0.2)
BASOPHILS NFR BLD AUTO: 0.8 %
BILIRUB SERPL-MCNC: 0.5 MG/DL (ref 0.3–1.2)
BUN BLD-MCNC: 10 MG/DL (ref 9–23)
BUN/CREAT SERPL: 12 (ref 10–20)
CALCIUM BLD-MCNC: 9.1 MG/DL (ref 8.7–10.4)
CHLORIDE SERPL-SCNC: 105 MMOL/L (ref 98–112)
CHOLEST SERPL-MCNC: 182 MG/DL (ref ?–200)
CO2 SERPL-SCNC: 24 MMOL/L (ref 21–32)
CREAT BLD-MCNC: 0.83 MG/DL (ref 0.55–1.02)
DEPRECATED RDW RBC AUTO: 40.6 FL (ref 35.1–46.3)
EGFRCR SERPLBLD CKD-EPI 2021: 91 ML/MIN/1.73M2 (ref 60–?)
EOSINOPHIL # BLD AUTO: 0.19 X10(3) UL (ref 0–0.7)
EOSINOPHIL NFR BLD AUTO: 3.1 %
ERYTHROCYTE [DISTWIDTH] IN BLOOD BY AUTOMATED COUNT: 12.2 % (ref 11–15)
FASTING PATIENT LIPID ANSWER: YES
FASTING STATUS PATIENT QL REPORTED: YES
GLOBULIN PLAS-MCNC: 2.6 G/DL (ref 2–3.5)
GLUCOSE BLD-MCNC: 88 MG/DL (ref 70–99)
HCT VFR BLD AUTO: 42.9 % (ref 35–48)
HDLC SERPL-MCNC: 73 MG/DL (ref 40–59)
HGB BLD-MCNC: 14.5 G/DL (ref 12–16)
IMM GRANULOCYTES # BLD AUTO: 0.03 X10(3) UL (ref 0–1)
IMM GRANULOCYTES NFR BLD: 0.5 %
LDLC SERPL CALC-MCNC: 90 MG/DL (ref ?–100)
LYMPHOCYTES # BLD AUTO: 1.84 X10(3) UL (ref 1–4)
LYMPHOCYTES NFR BLD AUTO: 29.7 %
MCH RBC QN AUTO: 31 PG (ref 26–34)
MCHC RBC AUTO-ENTMCNC: 33.8 G/DL (ref 31–37)
MCV RBC AUTO: 91.9 FL (ref 80–100)
MONOCYTES # BLD AUTO: 0.4 X10(3) UL (ref 0.1–1)
MONOCYTES NFR BLD AUTO: 6.5 %
NEUTROPHILS # BLD AUTO: 3.68 X10 (3) UL (ref 1.5–7.7)
NEUTROPHILS # BLD AUTO: 3.68 X10(3) UL (ref 1.5–7.7)
NEUTROPHILS NFR BLD AUTO: 59.4 %
NONHDLC SERPL-MCNC: 109 MG/DL (ref ?–130)
OSMOLALITY SERPL CALC.SUM OF ELEC: 284 MOSM/KG (ref 275–295)
PLATELET # BLD AUTO: 303 10(3)UL (ref 150–450)
POTASSIUM SERPL-SCNC: 3.9 MMOL/L (ref 3.5–5.1)
PROT SERPL-MCNC: 7 G/DL (ref 5.7–8.2)
RBC # BLD AUTO: 4.67 X10(6)UL (ref 3.8–5.3)
SODIUM SERPL-SCNC: 138 MMOL/L (ref 136–145)
TRIGL SERPL-MCNC: 107 MG/DL (ref 30–149)
TSI SER-ACNC: 1.37 UIU/ML (ref 0.55–4.78)
VLDLC SERPL CALC-MCNC: 17 MG/DL (ref 0–30)
WBC # BLD AUTO: 6.2 X10(3) UL (ref 4–11)

## 2025-07-19 PROCEDURE — 85025 COMPLETE CBC W/AUTO DIFF WBC: CPT

## 2025-07-19 PROCEDURE — 84443 ASSAY THYROID STIM HORMONE: CPT

## 2025-07-19 PROCEDURE — 80053 COMPREHEN METABOLIC PANEL: CPT

## 2025-07-19 PROCEDURE — 36415 COLL VENOUS BLD VENIPUNCTURE: CPT

## 2025-07-19 PROCEDURE — 80061 LIPID PANEL: CPT

## 2025-07-19 RX ORDER — PREDNISONE 50 MG/1
TABLET ORAL
COMMUNITY
Start: 2025-06-27

## 2025-07-19 RX ORDER — CYCLOBENZAPRINE HCL 5 MG
5 TABLET ORAL NIGHTLY PRN
Qty: 10 TABLET | Refills: 0 | Status: SHIPPED | OUTPATIENT
Start: 2025-07-19

## 2025-07-19 RX ORDER — ECHINACEA PURPUREA EXTRACT 125 MG
2 TABLET ORAL AS NEEDED
Qty: 60 ML | Refills: 0 | Status: SHIPPED | OUTPATIENT
Start: 2025-07-19

## 2025-07-19 RX ORDER — AZITHROMYCIN 250 MG/1
TABLET, FILM COATED ORAL
COMMUNITY
Start: 2025-06-27

## 2025-07-19 NOTE — ASSESSMENT & PLAN NOTE
-spasm noted.   -Ibuprofen 600 mg BID  -Trial of cyclobenzaprine 5 mg nightly PRN. May cause drowsiness.   -If sx persist, consider imaging, PT and physiatry referral.   Orders:    cyclobenzaprine 5 MG Oral Tab; Take 1 tablet (5 mg total) by mouth nightly as needed for Muscle spasms.

## 2025-07-19 NOTE — PROGRESS NOTES
Alisa Toussaint is a 40 year old female.  HPI:   Pt presents to clinic for annual physical.   Hx of fibroadenoma, has outstanding mammogram order, plans to schedule soon.   Follows annually with gynecology.   Hx of HTN. Reports home BP is 120/80. On amlodipine 5 mg daily. Reports she did not take her BP meds this morning and did not sleep well due to neck pain.   Pt reports left sided neck pain, muscle feels sore, noticed it while sleeping. No redness or swelling, no paresthesias.   Reports recent cold and sore throat, sinus pressure and pain, Went to UC and tx with zpak and prednisone. Sx nearly resolved, has mild PND. No new or worsening sx.   Current Medications[1]   Past Medical History[2]   Social History:  Short Social Hx on File[3]     REVIEW OF SYSTEMS:   Review of Systems   Constitutional:  Negative for activity change, appetite change, chills, diaphoresis, fatigue, fever and unexpected weight change.   HENT:  Positive for congestion.    Eyes:  Negative for photophobia, pain, discharge, redness, itching and visual disturbance.   Respiratory:  Negative for cough, chest tightness, shortness of breath and wheezing.    Cardiovascular:  Negative for chest pain, palpitations and leg swelling.   Gastrointestinal:  Negative for abdominal pain, constipation, diarrhea, nausea and vomiting.   Endocrine: Negative.    Genitourinary:  Negative for difficulty urinating and vaginal bleeding.   Musculoskeletal:  Negative for arthralgias and back pain.   Skin: Negative.    Neurological:  Negative for dizziness, seizures, numbness and headaches.   Hematological: Negative.    Psychiatric/Behavioral: Negative.            EXAM:   BP (!) 130/92   Pulse 83   Temp 97.4 °F (36.3 °C) (Temporal)   Ht 5' 2\" (1.575 m)   Wt 163 lb (73.9 kg)   LMP 07/04/2025 (Approximate)   SpO2 99%   BMI 29.81 kg/m²     Physical Exam  Vitals reviewed. Chaperone present: declined.   Constitutional:       General: She is not in acute distress.      Appearance: Normal appearance. She is normal weight. She is not ill-appearing.   HENT:      Head: Normocephalic and atraumatic.      Right Ear: Tympanic membrane, ear canal and external ear normal.      Left Ear: Tympanic membrane, ear canal and external ear normal.      Nose: Congestion present. No nasal tenderness, mucosal edema or rhinorrhea.      Mouth/Throat:      Pharynx: Oropharynx is clear. Postnasal drip present. No oropharyngeal exudate or posterior oropharyngeal erythema.   Eyes:      General: No scleral icterus.        Right eye: No discharge.         Left eye: No discharge.      Extraocular Movements: Extraocular movements intact.      Conjunctiva/sclera: Conjunctivae normal.      Pupils: Pupils are equal, round, and reactive to light.   Neck:      Thyroid: No thyroid mass or thyromegaly.   Cardiovascular:      Rate and Rhythm: Normal rate and regular rhythm.      Pulses: Normal pulses.      Heart sounds: Normal heart sounds.   Pulmonary:      Effort: Pulmonary effort is normal. No respiratory distress.      Breath sounds: Normal breath sounds.   Chest:   Breasts:     Right: Normal. No swelling, bleeding, inverted nipple, mass, nipple discharge, skin change or tenderness.      Left: Normal. No swelling, bleeding, inverted nipple, mass, nipple discharge, skin change or tenderness.   Abdominal:      General: Abdomen is flat. Bowel sounds are normal. There is no distension.      Palpations: Abdomen is soft. There is no mass.      Tenderness: There is no abdominal tenderness.   Musculoskeletal:         General: Normal range of motion.      Cervical back: Normal range of motion and neck supple. Spasms and tenderness present. No swelling, edema, deformity, erythema, signs of trauma, lacerations, rigidity, torticollis, bony tenderness or crepitus. No pain with movement. Normal range of motion.      Right lower leg: No edema.      Left lower leg: No edema.   Lymphadenopathy:      Cervical: No cervical  adenopathy.      Upper Body:      Right upper body: No supraclavicular, axillary or pectoral adenopathy.      Left upper body: No supraclavicular, axillary or pectoral adenopathy.   Skin:     General: Skin is warm and dry.      Coloration: Skin is not jaundiced.   Neurological:      General: No focal deficit present.      Mental Status: She is alert and oriented to person, place, and time.      Motor: Motor function is intact.      Gait: Gait normal.   Psychiatric:         Mood and Affect: Mood normal.         Judgment: Judgment normal.            ASSESSMENT AND PLAN:     Assessment & Plan  Wellness examination  Education provided on healthy lifestyle.  Diet: reduce saturated fats, simple carbs and excess sugars. Hydrate. Leafy greens, legumes, nuts/seeds, healthy sources of Omega 3, lean proteins, complex carbs, berries.   Exercise 30 min daily cardio as tolerated.   Immunizations reviewed and recommendations provided  Preventative health screening recommendations reviewed.   Previous lab and imaging results reviewed.    Orders:    Comp Metabolic Panel (14); Future    CBC With Differential With Platelet; Future    Lipid Panel; Future    TSH W Reflex To Free T4; Future    Lipid screening    Orders:    Lipid Panel; Future    Screening for deficiency anemia    Orders:    CBC With Differential With Platelet; Future    Thyroid disorder screen    Orders:    TSH W Reflex To Free T4; Future    Fibroadenoma of breast, unspecified laterality  Complete outstanding mammogram       Neck strain, initial encounter  -spasm noted.   -Ibuprofen 600 mg BID  -Trial of cyclobenzaprine 5 mg nightly PRN. May cause drowsiness.   -If sx persist, consider imaging, PT and physiatry referral.   Orders:    cyclobenzaprine 5 MG Oral Tab; Take 1 tablet (5 mg total) by mouth nightly as needed for Muscle spasms.    Primary hypertension  -Home BP reported 120/80.   -Did not take BP meds today.   -Denies cardiac sx.   -Advised to start BP meds asap  when returning home, check BP twice daily and keep log, RTC in 2 weeks for BP check.        Post-nasal drainage  -Continue nasal antihistamine spray. Start nasal saline PRN.             The patient indicates understanding of these issues and agrees to the plan.  The patient is asked to return in 2 weeks for BP check. .     The above note was creating using Dragon speech recognition technology. Please excuse any typos.       [1]   Current Outpatient Medications   Medication Sig Dispense Refill    predniSONE 50 MG Oral Tab TAKE 1 TABLET BY MOUTH EVERY DAY IN THE MORNING FOR 3 DAYS      azithromycin 250 MG Oral Tab       cyclobenzaprine 5 MG Oral Tab Take 1 tablet (5 mg total) by mouth nightly as needed for Muscle spasms. 10 tablet 0    sodium chloride 0.65 % Nasal Solution 2 sprays by Nasal route as needed. 60 mL 0    Salicylic Acid 6 % External Gel Apply 1 Application topically every evening. apply by topical route  every day as directed to warts 40 g 3    ketoconazole 2 % External Shampoo APPLY TOPICALLY TO THE SCALP 2 TIMES A WEEK 120 mL 5    neomycin-polymyxin-dexamethasone 3.5-10663-7.1 Ophthalmic Ointment Apply bid to eyelid eczema as directed 3.5 g 3    TRIAMCINOLONE 0.1 % External Cream APPLY TOPICALLY TO THE AFFECTED AREA TWICE DAILY 80 g 1    tretinoin 0.025 % External Cream Apply at bedtime for acne 20 g 3    amLODIPine 5 MG Oral Tab Take 1 tablet (5 mg total) by mouth daily. 90 tablet 3    Drospirenone (SLYND) 4 MG Oral Tab Take 1 tablet by mouth daily. 28 tablet 12    acyclovir 5 % External Ointment Please apply ointment on affected area  every 3 hours while awake for 5 days 30 g 1    clobetasol 0.05 % External Solution APPLY TO SCALP TWICE DAILY AS DIRECTED 50 mL 5    Adapalene-Benzoyl Peroxide (EPIDUO) 0.1-2.5 % External Gel Use bid to affected areas of face, chest, back as directed 45 g 0    Ascorbic Acid (VITAMIN C) 100 MG Oral Tab Take by mouth.      MULTIPLE VITAMINS-MINERALS ER OR Take by mouth.       Cholecalciferol (VITAMIN D) 125 MCG (5000 UT) Oral Cap Take by mouth.      acyclovir 400 MG Oral Tab Take 1 tablet (400 mg total) by mouth 3 (three) times daily. for 5 days (Patient not taking: Reported on 7/19/2025) 15 tablet 0   [2]   Past Medical History:   Acne    Bipolar II disorder (HCC)    Breast mass, left    Constipation    Depression    Essential hypertension    Fibroadenoma of breast, left    Migraines    Pt denies hx of migraines 4/3/15    Positive TB test    INH    Postconcussion syndrome    PPD positive    Sexually transmitted disease    Trich 5/16    Trichomonal vaginitis   [3]   Social History  Socioeconomic History    Marital status:    Tobacco Use    Smoking status: Never     Passive exposure: Never    Smokeless tobacco: Never   Vaping Use    Vaping status: Never Used   Substance and Sexual Activity    Alcohol use: No    Drug use: No     Comment: None.     Sexual activity: Yes     Partners: Male   Other Topics Concern    Caffeine Concern No    Exercise No    Pt has a pacemaker No    Pt has a defibrillator No    Breast feeding No    Reaction to local anesthetic No   Social History Narrative    The patient does not use an assistive device..      The patient does live in a home with stairs.     Social Drivers of Health      Received from [facility]    Housing Stability

## 2025-07-19 NOTE — ASSESSMENT & PLAN NOTE
-Home BP reported 120/80.   -Did not take BP meds today.   -Denies cardiac sx.   -Advised to start BP meds asap when returning home, check BP twice daily and keep log, RTC in 2 weeks for BP check.

## (undated) DIAGNOSIS — E28.2 PCOS (POLYCYSTIC OVARIAN SYNDROME): Primary | ICD-10-CM

## (undated) DEVICE — SOLUTION IRR 3000ML 0.9% NACL ARTHMTC LF

## (undated) DEVICE — PAD DRSG 8X3IN CRD POLY CTN ABS PERFORATE FILM LF STRL

## (undated) DEVICE — WATER STRL 1000ML PLASTIC POUR BTL LF

## (undated) DEVICE — Device

## (undated) DEVICE — CATHETER BARD 16FR FOLEY RND TIP INTMT AP RBR URTH STRL LTX

## (undated) DEVICE — GLOVE SURG 7 PROTEXIS LF CRM PF BEAD CUFF STRL PLISPRN 12IN

## (undated) DEVICE — SET ENDO INST MYOSURE SEAL HYSCP OFLW CHNL DISP

## (undated) DEVICE — SYSTEM FLUID MGMT FLUENT NS LF DISP

## (undated) DEVICE — TRAY SURG VAG LF

## (undated) DEVICE — DEVICE REM MYOSURE LT TISS BLADE HYSCP

## (undated) DEVICE — GLOVE SURG 7 PREMIERPRO LF NATURAL PF TXTR SMTH STRL

## (undated) DEVICE — DRAPE UNDER BUTT FLTR SCRN FL CNTRL POUCH DRN PORT GRAD

## (undated) DEVICE — GLOVE SURG 8 PREMIERPRO LF NATURAL PF TXTR SMTH STRL

## (undated) DEVICE — GLOVE SURG 7.5 PROTEXIS LF BLUE PF SMTH BEAD CUFF INTLK STRL

## (undated) DEVICE — GOWN SURG LG L3 NONREINFORCE SET IN SLV STRL LF DISP BLUE

## (undated) NOTE — MR AVS SNAPSHOT
[facility] [place] [address] [address]  [address] [phone]               Thank you for choosing us for your health care visit with Tylor Nation MD.  We are glad to serve you and happy to provide you with this summary of Commonly known as:  LOESTRIN FE 1.5/30           Norgestimate-Eth Estradiol 0.25-35 MG-MCG Tabs   Take 1 tablet by mouth daily.    Commonly known as:  SPRINTEC 28           Pantoprazole Sodium 40 MG Tbec   Take 1 tablet (40 mg total) by mouth every morning

## (undated) NOTE — LETTER
9/15/2017              [address]         Dear Lauren Jones,      It was a pleasure to see you at our [facility] office.   Your lab tests were normal.  There

## (undated) NOTE — MR AVS SNAPSHOT
[place]  [address]  [phone]               Thank you for choosing us for your health care visit with Arvin Hooper MD.  We are glad to serve you and happy to provide you with this summary of Ketoconazole 2 % Sham   Apply to scalp 2 times per week. Commonly known as:  NIZORAL           Norgestimate-Eth Estradiol 0.25-35 MG-MCG Tabs   Take 1 tablet by mouth daily.    Commonly known as:  SPRINTEC 28           Pantoprazole Sodium 40 MG Tbec   Ta

## (undated) NOTE — LETTER
Date & Time: 11/9/2020, 4:52 PM  Patient: Lena Morejon  Encounter Provider(s):    Rosio Mcmanus MD       To Whom It May Concern:    Arvind Estrada was seen and treated in our department on 11/9/2020. She can return to [id number]     If you

## (undated) NOTE — MR AVS SNAPSHOT
[facility] SPECIALTY [facility] - [address]  [address] [phone]               Thank you for choosing us for your health care visit with Federico Quinteros MD.  We are glad to serve you and happy to provide you with this summary Ferrous Gluconate 325 (36 Fe) MG Tabs   1 po qd           Ketoconazole 2 % Sham   Apply to scalp 2 times per week.    Commonly known as:  NIZORAL           Pantoprazole Sodium 40 MG Tbec   Take 1 tablet (40 mg total) by mouth every morning before breakf

## (undated) NOTE — LETTER
06/11/21 [address] [facility]      Dear India Win,    Our records indicate that you have outstanding lab work and or testing that was ordered for you and has not yet been completed:  Orders Placed This E

## (undated) NOTE — MR AVS SNAPSHOT
[place]  [address]  [phone]               Thank you for choosing us for your health care visit with FARIDA Mansfield.   We are glad to serve you and happy to provide you with this summar Ferrous Gluconate 325 (36 Fe) MG Tabs   1 po qd           fluconazole 150 MG Tabs   Take 1 tablet (150 mg total) by mouth once.  1 dose now and repeat dose in 3 days   Commonly known as:  DIFLUCAN           Ketoconazole 2 % Sham   Apply to scalp 2 times pe

## (undated) NOTE — LETTER
Date: 12/23/2024    Patient Name: Alisa Toussaint          To Whom it may concern:    The above patient was seen at [facility] for treatment of a medical condition.    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 Patient has 1 lbs restriction with right hand and will need to be immobilized for 6 weeks.    Sincerely,      Heriberto Bull MD   Hand, Wrist, & Elbow Surgery  [email or web address]  t: [phone]  f: [phone]

## (undated) NOTE — LETTER
09/11/21 [address] [facility] [address]      Dear Eric [place] records indicate that you have outstanding lab work and or testing that was ordered for you and has not yet been completed:  Orders Placed T

## (undated) NOTE — MR AVS SNAPSHOT
[facility] - [place] [address] [address]  [id number] [phone]               Thank you for choosing us for your health care visit with Gwen Ojeda MD.  We are glad to serve you and happy to provide you with this summary Clindamycin Phos-Benzoyl Perox 1-5 % Gel   Apply 1 Application topically 2 (two) times daily.  Use bid to affected areas of skin   Commonly known as:  BENZACLIN           Ferrous Gluconate 325 (36 Fe) MG Tabs   1 po qd           Ketoconazole 2 % Sham   Ron including white bread, rice and pasta   Eat plenty of protein, keep the fat content low Sugars:  sodas and sports drinks, candies and desserts   Eat plenty of low-fat dairy products High fat meats and dairy   Choose whole grain products Foods high in sodiu

## (undated) NOTE — MR AVS SNAPSHOT
After Visit Summary   6/15/2020    Deanne Restrepo    MRN: [id number]           Visit Information     Date & Time  6/15/2020  8:00 AM Provider  Luis Tucker MD [address] [facility]  Phon 7/17/2020 11:00 AM [facility]    7/29/2020 5:45 PM Critical access hospital - [facility]      Follow-up Instructions    Return for pending test results.         Instructions      Understanding STIs for certain STIs. Use condoms correctly  For condoms to work, they must be used the right way. Keep these tips in mind:  · Use a new latex condom each time you have sex. Slip the condom on the penis before any contact is made.   · When ready to withdraw, h © 8585-2551 [address] [facility], [facility] [place]. All rights reserved. This information is not intended as a substitute for professional medical care. Always follow your healthcare professional's instructions. non-life-threatening.   Also available by appointment     Average cost  $70*       [address]   Monday – Friday  10:00 am – 10:00 pm   Saturday – Sunday  10:00 am – 4:00 pm

## (undated) NOTE — MR AVS SNAPSHOT
[place]  [address]  [phone]               Thank you for choosing us for your health care visit with Jonna Reaves MD.  We are glad to serve you and happy to provide you with this summary of taking this medication, and follow the directions you see here. FINACEA 15 % Gel   Generic drug:  Azelaic Acid   USE TO FACE TWICE DAILY AS DIRECTED           Ketoconazole 2 % Sham   Apply to scalp 2 times per week.    Commonly known as:  NIZORAL Instructions:   To schedule a test at any [facility], call Central Scheduling at [phone], Monday through Friday between 7:30am to 6pm and on Saturday between 8am and 1pm. Evening and weekend appointments for your exam are a

## (undated) NOTE — LETTER
Date: 4/11/2025    Patient Name: Alisa Toussaint          To Whom it may concern:    The above patient was seen at [facility] for treatment of a medical condition.    Patient has been recommended surgery for the right wrist that entails wrist arthroscopy, TFC debridement, and ECU tenosynovectomy with ulnar shortening osteotomy to address chronic debilitating wrist pain. Patient's condition prevent her from being able to work in any significant capacity. Surgery is to be scheduled at a mutually available time.       Sincerely,    Heriberto Bull MD   Hand, Wrist, & Elbow Surgery  [email or web address]  t: [phone]  f: [phone]

## (undated) NOTE — LETTER
Date: 2/18/2025    Patient Name: Alisa Toussaint          To Whom it may concern:    This letter has been written at the patient's request. The above patient was seen at [facility] for treatment of a medical condition.    This patient should be excused from attending work at this time. We have recommended that she begin a course of physical therapy to recover motion and strength and if this is not successful, we would recommend surgery.    We have asked the patient to follow up in our office in 2 months. We kindly ask that you extend her leave until that time.             Sincerely,    Heriberto Bull MD